# Patient Record
Sex: FEMALE | Race: WHITE | Employment: OTHER | URBAN - METROPOLITAN AREA
[De-identification: names, ages, dates, MRNs, and addresses within clinical notes are randomized per-mention and may not be internally consistent; named-entity substitution may affect disease eponyms.]

---

## 2017-01-16 PROCEDURE — 83690 ASSAY OF LIPASE: CPT | Performed by: INTERNAL MEDICINE

## 2017-01-30 PROBLEM — M11.20 CHONDROCALCINOSIS: Status: ACTIVE | Noted: 2017-01-30

## 2017-01-30 PROBLEM — M25.571 RIGHT ANKLE PAIN, UNSPECIFIED CHRONICITY: Status: ACTIVE | Noted: 2017-01-30

## 2017-01-30 PROBLEM — R29.898 RIGHT LEG WEAKNESS: Status: ACTIVE | Noted: 2017-01-30

## 2017-01-30 PROBLEM — G89.29 CHRONIC PAIN OF RIGHT KNEE: Status: ACTIVE | Noted: 2017-01-30

## 2017-01-30 PROBLEM — M25.561 CHRONIC PAIN OF RIGHT KNEE: Status: ACTIVE | Noted: 2017-01-30

## 2017-02-08 PROCEDURE — 82607 VITAMIN B-12: CPT | Performed by: INTERNAL MEDICINE

## 2017-02-08 PROCEDURE — 82746 ASSAY OF FOLIC ACID SERUM: CPT | Performed by: INTERNAL MEDICINE

## 2017-02-08 PROCEDURE — 36415 COLL VENOUS BLD VENIPUNCTURE: CPT | Performed by: INTERNAL MEDICINE

## 2017-02-08 PROCEDURE — 82306 VITAMIN D 25 HYDROXY: CPT | Performed by: INTERNAL MEDICINE

## 2017-10-25 PROBLEM — F17.209 NICOTINE DEPENDENCE WITH NICOTINE-INDUCED DISORDER: Status: ACTIVE | Noted: 2017-10-25

## 2018-03-30 PROBLEM — N32.81 OAB (OVERACTIVE BLADDER): Status: ACTIVE | Noted: 2018-03-30

## 2018-03-30 PROBLEM — N39.41 URGE INCONTINENCE OF URINE: Status: ACTIVE | Noted: 2018-03-30

## 2018-03-30 PROBLEM — R26.81 GAIT INSTABILITY: Status: ACTIVE | Noted: 2018-03-30

## 2018-03-30 PROBLEM — M15.9 PRIMARY OSTEOARTHRITIS INVOLVING MULTIPLE JOINTS: Status: ACTIVE | Noted: 2018-03-30

## 2018-08-20 PROBLEM — M79.2 NEUROPATHIC PAIN: Status: ACTIVE | Noted: 2018-08-20

## 2018-08-20 PROBLEM — I10 ESSENTIAL HYPERTENSION: Status: ACTIVE | Noted: 2018-08-20

## 2018-09-21 PROBLEM — D68.59 PRIMARY HYPERCOAGULABLE STATE (HCC): Status: ACTIVE | Noted: 2018-09-21

## 2018-09-26 ENCOUNTER — APPOINTMENT (OUTPATIENT)
Dept: GENERAL RADIOLOGY | Facility: HOSPITAL | Age: 63
End: 2018-09-26
Attending: PHYSICIAN ASSISTANT
Payer: MEDICARE

## 2018-09-26 ENCOUNTER — HOSPITAL ENCOUNTER (EMERGENCY)
Facility: HOSPITAL | Age: 63
Discharge: HOME | End: 2018-09-26
Attending: EMERGENCY MEDICINE
Payer: MEDICARE

## 2018-09-26 VITALS
HEIGHT: 70 IN | SYSTOLIC BLOOD PRESSURE: 153 MMHG | DIASTOLIC BLOOD PRESSURE: 87 MMHG | RESPIRATION RATE: 16 BRPM | WEIGHT: 250 LBS | HEART RATE: 88 BPM | OXYGEN SATURATION: 88 % | BODY MASS INDEX: 35.79 KG/M2 | TEMPERATURE: 97 F

## 2018-09-26 DIAGNOSIS — J44.1 COPD EXACERBATION (HCC): Primary | ICD-10-CM

## 2018-09-26 DIAGNOSIS — J40 BRONCHITIS: ICD-10-CM

## 2018-09-26 PROCEDURE — 80048 BASIC METABOLIC PNL TOTAL CA: CPT | Performed by: PHYSICIAN ASSISTANT

## 2018-09-26 PROCEDURE — 71046 X-RAY EXAM CHEST 2 VIEWS: CPT | Performed by: PHYSICIAN ASSISTANT

## 2018-09-26 PROCEDURE — 99285 EMERGENCY DEPT VISIT HI MDM: CPT

## 2018-09-26 PROCEDURE — 99284 EMERGENCY DEPT VISIT MOD MDM: CPT

## 2018-09-26 PROCEDURE — 94640 AIRWAY INHALATION TREATMENT: CPT

## 2018-09-26 PROCEDURE — 96374 THER/PROPH/DIAG INJ IV PUSH: CPT

## 2018-09-26 PROCEDURE — 85025 COMPLETE CBC W/AUTO DIFF WBC: CPT | Performed by: PHYSICIAN ASSISTANT

## 2018-09-26 PROCEDURE — 94644 CONT INHLJ TX 1ST HOUR: CPT

## 2018-09-26 RX ORDER — ALBUTEROL SULFATE 90 UG/1
2 AEROSOL, METERED RESPIRATORY (INHALATION) EVERY 4 HOURS PRN
Qty: 1 INHALER | Refills: 0 | Status: SHIPPED | OUTPATIENT
Start: 2018-09-26 | End: 2018-10-26

## 2018-09-26 RX ORDER — PREDNISONE 20 MG/1
40 TABLET ORAL ONCE
Status: DISCONTINUED | OUTPATIENT
Start: 2018-09-26 | End: 2018-09-26

## 2018-09-26 RX ORDER — PREDNISONE 20 MG/1
40 TABLET ORAL DAILY
Qty: 10 TABLET | Refills: 0 | Status: SHIPPED | OUTPATIENT
Start: 2018-09-26 | End: 2018-09-26

## 2018-09-26 RX ORDER — DOXYCYCLINE HYCLATE 100 MG/1
100 CAPSULE ORAL 2 TIMES DAILY
Qty: 20 CAPSULE | Refills: 0 | Status: SHIPPED | OUTPATIENT
Start: 2018-09-26 | End: 2018-10-06

## 2018-09-26 RX ORDER — DEXAMETHASONE SODIUM PHOSPHATE 4 MG/ML
10 VIAL (ML) INJECTION ONCE
Status: COMPLETED | OUTPATIENT
Start: 2018-09-26 | End: 2018-09-26

## 2018-09-26 NOTE — ED PROVIDER NOTES
Patient Seen in: [facility] Emergency Department    History   Patient presents with:  Dyspnea SEMAJ SOB (respiratory)    Stated Complaint: SEMAJ x 3 days    HPI    Patient is a 58-year-old female with a medical history of protein S deficiency, hyperlipi cyst removed  No date: CHOLECYSTECTOMY  6/12/2015: MOUTH ODONTECTOMY; N/A      Comment:  Performed by Bob Mays DDS at [address] [facility],[address]: OTHER; Bilateral      Comment:  Knee caps removed  2004: OTHER      Comment:  All upper teeth removal  No date: O deformity, NVI  Back: Full range of motion  Skin: No sign of trauma, Skin warm and dry, no induration or sign of infection. Neuro: Cranial nerves intact, Normal Gait.     ED Course     Labs Reviewed   BASIC METABOLIC PANEL (8) - Abnormal; Notable for the Normal size cardiac silhouette. MEDIASTINUM:  Normal. PLEURA:  Normal.  No pleural effusions. BONES:  Normal for age. CONCLUSION:  1. COPD/bronchitis.  2. Multiple bilateral nodular densities on the lateral projection may represent lymph nodes or mass (100 mg total) by mouth 2 (two) times daily for 10 days. Qty: 20 capsule Refills: 0    !! - Potential duplicate medications found. Please discuss with provider.

## 2018-09-27 NOTE — CM/SW NOTE
Fax from [phone] received regarding new prescription from ED, Doxycycline Hyclate increases risk for bleeding while on warfarin.  This writer talked to Dr. Melendez Manual that reports patient should take new medication, but needs repeat INR on M

## 2018-10-01 PROBLEM — R93.89 ABNORMAL CXR: Status: ACTIVE | Noted: 2018-10-01

## 2018-11-12 PROCEDURE — 80178 ASSAY OF LITHIUM: CPT | Performed by: INTERNAL MEDICINE

## 2018-11-12 PROCEDURE — 36415 COLL VENOUS BLD VENIPUNCTURE: CPT | Performed by: INTERNAL MEDICINE

## 2019-01-01 ENCOUNTER — APPOINTMENT (OUTPATIENT)
Dept: GENERAL RADIOLOGY | Facility: HOSPITAL | Age: 64
DRG: 907 | End: 2019-01-01
Attending: INTERNAL MEDICINE
Payer: MEDICARE

## 2019-01-01 ENCOUNTER — HOSPITAL ENCOUNTER (EMERGENCY)
Facility: HOSPITAL | Age: 64
Discharge: LEFT BEFORE DISCHARGE | End: 2019-01-01
Attending: EMERGENCY MEDICINE
Payer: MEDICARE

## 2019-01-01 ENCOUNTER — HOSPITAL ENCOUNTER (INPATIENT)
Facility: HOSPITAL | Age: 64
LOS: 24 days | Discharge: NURSING, REHAB OR LONG-TERM CARE | DRG: 907 | End: 2020-01-01
Attending: EMERGENCY MEDICINE | Admitting: INTERNAL MEDICINE
Payer: MEDICARE

## 2019-01-01 ENCOUNTER — HOSPITAL ENCOUNTER (EMERGENCY)
Facility: HOSPITAL | Age: 64
Discharge: HOME | End: 2019-01-01
Attending: EMERGENCY MEDICINE
Payer: MEDICARE

## 2019-01-01 ENCOUNTER — APPOINTMENT (OUTPATIENT)
Dept: GENERAL RADIOLOGY | Facility: HOSPITAL | Age: 64
End: 2019-01-01
Attending: EMERGENCY MEDICINE
Payer: MEDICARE

## 2019-01-01 ENCOUNTER — APPOINTMENT (OUTPATIENT)
Dept: ULTRASOUND IMAGING | Facility: HOSPITAL | Age: 64
DRG: 907 | End: 2019-01-01
Attending: INTERNAL MEDICINE
Payer: MEDICARE

## 2019-01-01 ENCOUNTER — APPOINTMENT (OUTPATIENT)
Dept: GENERAL RADIOLOGY | Facility: HOSPITAL | Age: 64
End: 2019-01-01
Attending: PHYSICIAN ASSISTANT
Payer: MEDICARE

## 2019-01-01 ENCOUNTER — APPOINTMENT (OUTPATIENT)
Dept: MRI IMAGING | Facility: HOSPITAL | Age: 64
DRG: 907 | End: 2019-01-01
Attending: Other
Payer: MEDICARE

## 2019-01-01 ENCOUNTER — APPOINTMENT (OUTPATIENT)
Dept: CT IMAGING | Facility: HOSPITAL | Age: 64
End: 2019-01-01
Attending: EMERGENCY MEDICINE
Payer: MEDICARE

## 2019-01-01 ENCOUNTER — APPOINTMENT (OUTPATIENT)
Dept: CT IMAGING | Facility: HOSPITAL | Age: 64
DRG: 907 | End: 2019-01-01
Attending: INTERNAL MEDICINE
Payer: MEDICARE

## 2019-01-01 ENCOUNTER — ANESTHESIA EVENT (OUTPATIENT)
Dept: SURGERY | Facility: HOSPITAL | Age: 64
DRG: 907 | End: 2019-01-01
Payer: MEDICARE

## 2019-01-01 ENCOUNTER — APPOINTMENT (OUTPATIENT)
Dept: GENERAL RADIOLOGY | Facility: HOSPITAL | Age: 64
DRG: 907 | End: 2019-01-01
Attending: NURSE PRACTITIONER
Payer: MEDICARE

## 2019-01-01 ENCOUNTER — APPOINTMENT (OUTPATIENT)
Dept: CT IMAGING | Facility: HOSPITAL | Age: 64
DRG: 907 | End: 2019-01-01
Attending: EMERGENCY MEDICINE
Payer: MEDICARE

## 2019-01-01 ENCOUNTER — APPOINTMENT (OUTPATIENT)
Dept: GENERAL RADIOLOGY | Facility: HOSPITAL | Age: 64
DRG: 907 | End: 2019-01-01
Attending: EMERGENCY MEDICINE
Payer: MEDICARE

## 2019-01-01 ENCOUNTER — APPOINTMENT (OUTPATIENT)
Dept: CT IMAGING | Facility: HOSPITAL | Age: 64
End: 2019-01-01
Attending: PHYSICIAN ASSISTANT
Payer: MEDICARE

## 2019-01-01 ENCOUNTER — ANESTHESIA (OUTPATIENT)
Dept: SURGERY | Facility: HOSPITAL | Age: 64
DRG: 907 | End: 2019-01-01
Payer: MEDICARE

## 2019-01-01 VITALS
HEIGHT: 70 IN | BODY MASS INDEX: 35.79 KG/M2 | HEART RATE: 73 BPM | SYSTOLIC BLOOD PRESSURE: 155 MMHG | RESPIRATION RATE: 22 BRPM | OXYGEN SATURATION: 96 % | WEIGHT: 250 LBS | DIASTOLIC BLOOD PRESSURE: 95 MMHG | TEMPERATURE: 98 F

## 2019-01-01 VITALS
DIASTOLIC BLOOD PRESSURE: 93 MMHG | HEART RATE: 74 BPM | TEMPERATURE: 98 F | SYSTOLIC BLOOD PRESSURE: 174 MMHG | OXYGEN SATURATION: 100 % | RESPIRATION RATE: 16 BRPM

## 2019-01-01 DIAGNOSIS — R41.0 CONFUSION: Primary | ICD-10-CM

## 2019-01-01 DIAGNOSIS — R41.82 ALTERED MENTAL STATUS, UNSPECIFIED ALTERED MENTAL STATUS TYPE: Primary | ICD-10-CM

## 2019-01-01 DIAGNOSIS — N25.1 NEPHROGENIC DIABETES INSIPIDUS (HCC): ICD-10-CM

## 2019-01-01 DIAGNOSIS — R73.9 HYPERGLYCEMIA: ICD-10-CM

## 2019-01-01 DIAGNOSIS — K66.8 PERITONEAL CAVITY FREE AIR: ICD-10-CM

## 2019-01-01 DIAGNOSIS — J44.1 COPD EXACERBATION (HCC): ICD-10-CM

## 2019-01-01 DIAGNOSIS — S93.401A SPRAIN OF RIGHT ANKLE, UNSPECIFIED LIGAMENT, INITIAL ENCOUNTER: Primary | ICD-10-CM

## 2019-01-01 DIAGNOSIS — K66.8 PERITONEAL CAVITY FREE AIR: Primary | ICD-10-CM

## 2019-01-01 LAB
ALBUMIN SERPL ELPH-MCNC: 3.78 G/DL (ref 3.75–5.21)
ALBUMIN SERPL-MCNC: 2.3 G/DL (ref 3.4–5)
ALBUMIN SERPL-MCNC: 2.3 G/DL (ref 3.4–5)
ALBUMIN SERPL-MCNC: 2.5 G/DL (ref 3.4–5)
ALBUMIN SERPL-MCNC: 2.5 G/DL (ref 3.4–5)
ALBUMIN SERPL-MCNC: 2.6 G/DL (ref 3.4–5)
ALBUMIN SERPL-MCNC: 2.7 G/DL (ref 3.4–5)
ALBUMIN SERPL-MCNC: 2.7 G/DL (ref 3.4–5)
ALBUMIN SERPL-MCNC: 2.8 G/DL (ref 3.4–5)
ALBUMIN SERPL-MCNC: 2.8 G/DL (ref 3.4–5)
ALBUMIN SERPL-MCNC: 3.1 G/DL (ref 3.4–5)
ALBUMIN SERPL-MCNC: 3.2 G/DL (ref 3.4–5)
ALBUMIN SERPL-MCNC: 3.3 G/DL (ref 3.4–5)
ALBUMIN SERPL-MCNC: 4 G/DL (ref 3.4–5)
ALBUMIN/GLOB SERPL: 0.8 {RATIO} (ref 1–2)
ALBUMIN/GLOB SERPL: 0.9 {RATIO} (ref 1–2)
ALBUMIN/GLOB SERPL: 0.9 {RATIO} (ref 1–2)
ALBUMIN/GLOB SERPL: 1.2 {RATIO} (ref 1–2)
ALBUMIN/GLOB SERPL: 1.44 {RATIO} (ref 1–2)
ALDOLASE, SERUM: 3.9 U/L
ALLENS TEST: POSITIVE
ALLENS TEST: POSITIVE
ALP LIVER SERPL-CCNC: 110 U/L (ref 55–142)
ALP LIVER SERPL-CCNC: 156 U/L (ref 55–142)
ALP LIVER SERPL-CCNC: 179 U/L (ref 55–142)
ALP LIVER SERPL-CCNC: 91 U/L (ref 55–142)
ALPHA1 GLOB SERPL ELPH-MCNC: 0.36 G/DL (ref 0.19–0.46)
ALPHA2 GLOB SERPL ELPH-MCNC: 0.86 G/DL (ref 0.48–1.05)
ALT SERPL-CCNC: 23 U/L (ref 13–56)
ALT SERPL-CCNC: 23 U/L (ref 13–56)
ALT SERPL-CCNC: 31 U/L (ref 13–56)
ALT SERPL-CCNC: 34 U/L (ref 13–56)
AMMONIA PLAS-MCNC: 11 UMOL/L (ref 11–32)
AMMONIA PLAS-MCNC: 13 UMOL/L (ref 11–32)
AMMONIA PLAS-MCNC: 36 UMOL/L (ref 11–32)
AMPHET UR QL SCN: NEGATIVE
ANA SCREEN: NEGATIVE
ANION GAP SERPL CALC-SCNC: 0 MMOL/L (ref 0–18)
ANION GAP SERPL CALC-SCNC: 1 MMOL/L (ref 0–18)
ANION GAP SERPL CALC-SCNC: 10 MMOL/L (ref 0–18)
ANION GAP SERPL CALC-SCNC: 3 MMOL/L (ref 0–18)
ANION GAP SERPL CALC-SCNC: 4 MMOL/L (ref 0–18)
ANION GAP SERPL CALC-SCNC: 5 MMOL/L (ref 0–18)
ANION GAP SERPL CALC-SCNC: 6 MMOL/L (ref 0–18)
ANION GAP SERPL CALC-SCNC: 7 MMOL/L (ref 0–18)
ANION GAP SERPL CALC-SCNC: 8 MMOL/L (ref 0–18)
ANION GAP SERPL CALC-SCNC: 9 MMOL/L (ref 0–18)
ANTIBODY SCREEN: NEGATIVE
ARTERIAL BLD GAS O2 SATURATION: 90 % (ref 92–100)
ARTERIAL BLD GAS O2 SATURATION: 93 % (ref 92–100)
ARTERIAL BLD GAS O2 SATURATION: 95 % (ref 92–100)
ARTERIAL BLOOD GAS BASE EXCESS: -1.3 MMOL/L (ref ?–2)
ARTERIAL BLOOD GAS BASE EXCESS: -2.8 MMOL/L (ref ?–2)
ARTERIAL BLOOD GAS BASE EXCESS: -3.7 MMOL/L (ref ?–2)
ARTERIAL BLOOD GAS HCO3: 21.6 MEQ/L (ref 22–26)
ARTERIAL BLOOD GAS HCO3: 22.1 MEQ/L (ref 22–26)
ARTERIAL BLOOD GAS HCO3: 23.3 MEQ/L (ref 22–26)
ARTERIAL BLOOD GAS PCO2: 33 MM HG (ref 35–45)
ARTERIAL BLOOD GAS PCO2: 40 MM HG (ref 35–45)
ARTERIAL BLOOD GAS PCO2: 45 MM HG (ref 35–45)
ARTERIAL BLOOD GAS PH: 7.33 (ref 7.35–7.45)
ARTERIAL BLOOD GAS PH: 7.35 (ref 7.35–7.45)
ARTERIAL BLOOD GAS PH: 7.44 (ref 7.35–7.45)
ARTERIAL BLOOD GAS PO2: 62 MM HG (ref 80–105)
ARTERIAL BLOOD GAS PO2: 81 MM HG (ref 80–105)
ARTERIAL BLOOD GAS PO2: 89 MM HG (ref 80–105)
AST SERPL-CCNC: 15 U/L (ref 15–37)
AST SERPL-CCNC: 18 U/L (ref 15–37)
AST SERPL-CCNC: 24 U/L (ref 15–37)
AST SERPL-CCNC: 27 U/L (ref 15–37)
ATRIAL RATE: 67 BPM
ATRIAL RATE: 80 BPM
ATRIAL RATE: 83 BPM
B BURGDOR IGG+IGM SER QL: NEGATIVE
B-GLOBULIN SERPL ELPH-MCNC: 0.82 G/DL (ref 0.68–1.23)
BARBITURATES UR QL SCN: NEGATIVE
BASOPHILS # BLD AUTO: 0.01 X10(3) UL (ref 0–0.2)
BASOPHILS # BLD AUTO: 0.02 X10(3) UL (ref 0–0.2)
BASOPHILS # BLD AUTO: 0.03 X10(3) UL (ref 0–0.2)
BASOPHILS # BLD AUTO: 0.03 X10(3) UL (ref 0–0.2)
BASOPHILS NFR BLD AUTO: 0.1 %
BASOPHILS NFR BLD AUTO: 0.2 %
BILIRUB SERPL-MCNC: 0.4 MG/DL (ref 0.1–2)
BILIRUB SERPL-MCNC: 0.5 MG/DL (ref 0.1–2)
BILIRUB SERPL-MCNC: 0.6 MG/DL (ref 0.1–2)
BILIRUB SERPL-MCNC: 0.7 MG/DL (ref 0.1–2)
BILIRUB UR QL STRIP.AUTO: NEGATIVE
BILIRUB UR QL STRIP.AUTO: NEGATIVE
BLOOD TYPE BARCODE: 7300
BUN BLD-MCNC: 10 MG/DL (ref 7–18)
BUN BLD-MCNC: 108 MG/DL (ref 7–18)
BUN BLD-MCNC: 109 MG/DL (ref 7–18)
BUN BLD-MCNC: 109 MG/DL (ref 7–18)
BUN BLD-MCNC: 11 MG/DL (ref 7–18)
BUN BLD-MCNC: 14 MG/DL (ref 7–18)
BUN BLD-MCNC: 15 MG/DL (ref 7–18)
BUN BLD-MCNC: 18 MG/DL (ref 7–18)
BUN BLD-MCNC: 19 MG/DL (ref 7–18)
BUN BLD-MCNC: 20 MG/DL (ref 7–18)
BUN BLD-MCNC: 21 MG/DL (ref 7–18)
BUN BLD-MCNC: 26 MG/DL (ref 7–18)
BUN BLD-MCNC: 28 MG/DL (ref 7–18)
BUN BLD-MCNC: 34 MG/DL (ref 7–18)
BUN BLD-MCNC: 35 MG/DL (ref 7–18)
BUN BLD-MCNC: 39 MG/DL (ref 7–18)
BUN BLD-MCNC: 50 MG/DL (ref 7–18)
BUN BLD-MCNC: 50 MG/DL (ref 7–18)
BUN BLD-MCNC: 67 MG/DL (ref 7–18)
BUN BLD-MCNC: 68 MG/DL (ref 7–18)
BUN BLD-MCNC: 79 MG/DL (ref 7–18)
BUN BLD-MCNC: 85 MG/DL (ref 7–18)
BUN BLD-MCNC: 88 MG/DL (ref 7–18)
BUN BLD-MCNC: 89 MG/DL (ref 7–18)
BUN BLD-MCNC: 99 MG/DL (ref 7–18)
BUN/CREAT SERPL: 10.5 (ref 10–20)
BUN/CREAT SERPL: 11 (ref 10–20)
BUN/CREAT SERPL: 11.9 (ref 10–20)
BUN/CREAT SERPL: 14.9 (ref 10–20)
BUN/CREAT SERPL: 15.4 (ref 10–20)
BUN/CREAT SERPL: 15.7 (ref 10–20)
BUN/CREAT SERPL: 15.9 (ref 10–20)
BUN/CREAT SERPL: 18.1 (ref 10–20)
BUN/CREAT SERPL: 18.7 (ref 10–20)
BUN/CREAT SERPL: 19.2 (ref 10–20)
BUN/CREAT SERPL: 21.1 (ref 10–20)
BUN/CREAT SERPL: 22.5 (ref 10–20)
BUN/CREAT SERPL: 22.5 (ref 10–20)
BUN/CREAT SERPL: 23.6 (ref 10–20)
BUN/CREAT SERPL: 24.2 (ref 10–20)
BUN/CREAT SERPL: 25.2 (ref 10–20)
BUN/CREAT SERPL: 27.9 (ref 10–20)
BUN/CREAT SERPL: 27.9 (ref 10–20)
BUN/CREAT SERPL: 31.8 (ref 10–20)
BUN/CREAT SERPL: 32.5 (ref 10–20)
BUN/CREAT SERPL: 35.1 (ref 10–20)
BUN/CREAT SERPL: 37 (ref 10–20)
BUN/CREAT SERPL: 40.3 (ref 10–20)
BUN/CREAT SERPL: 41.4 (ref 10–20)
BUN/CREAT SERPL: 42.1 (ref 10–20)
BUN/CREAT SERPL: 42.3 (ref 10–20)
BUN/CREAT SERPL: 44 (ref 10–20)
BUN/CREAT SERPL: 45.2 (ref 10–20)
BUN/CREAT SERPL: 45.2 (ref 10–20)
BUN/CREAT SERPL: 46.8 (ref 10–20)
BUN/CREAT SERPL: 7.8 (ref 10–20)
CALCIUM BLD-MCNC: 10 MG/DL (ref 8.5–10.1)
CALCIUM BLD-MCNC: 10.1 MG/DL (ref 8.5–10.1)
CALCIUM BLD-MCNC: 10.1 MG/DL (ref 8.5–10.1)
CALCIUM BLD-MCNC: 10.3 MG/DL (ref 8.5–10.1)
CALCIUM BLD-MCNC: 10.3 MG/DL (ref 8.5–10.1)
CALCIUM BLD-MCNC: 10.4 MG/DL (ref 8.5–10.1)
CALCIUM BLD-MCNC: 10.6 MG/DL (ref 8.5–10.1)
CALCIUM BLD-MCNC: 10.8 MG/DL (ref 8.5–10.1)
CALCIUM BLD-MCNC: 9 MG/DL (ref 8.5–10.1)
CALCIUM BLD-MCNC: 9 MG/DL (ref 8.5–10.1)
CALCIUM BLD-MCNC: 9.1 MG/DL (ref 8.5–10.1)
CALCIUM BLD-MCNC: 9.2 MG/DL (ref 8.5–10.1)
CALCIUM BLD-MCNC: 9.2 MG/DL (ref 8.5–10.1)
CALCIUM BLD-MCNC: 9.4 MG/DL (ref 8.5–10.1)
CALCIUM BLD-MCNC: 9.5 MG/DL (ref 8.5–10.1)
CALCIUM BLD-MCNC: 9.5 MG/DL (ref 8.5–10.1)
CALCIUM BLD-MCNC: 9.6 MG/DL (ref 8.5–10.1)
CALCIUM BLD-MCNC: 9.6 MG/DL (ref 8.5–10.1)
CALCIUM BLD-MCNC: 9.7 MG/DL (ref 8.5–10.1)
CALCIUM BLD-MCNC: 9.8 MG/DL (ref 8.5–10.1)
CALCIUM BLD-MCNC: 9.8 MG/DL (ref 8.5–10.1)
CALCIUM BLD-MCNC: 9.9 MG/DL (ref 8.5–10.1)
CALCIUM BLD-MCNC: 9.9 MG/DL (ref 8.5–10.1)
CALCULATED O2 SATURATION: 90 % (ref 92–100)
CALCULATED O2 SATURATION: 95 % (ref 92–100)
CALCULATED O2 SATURATION: 97 % (ref 92–100)
CANNABINOIDS UR QL SCN: NEGATIVE
CARBOXYHEMOGLOBIN: 1.6 % SAT (ref 0–3)
CARBOXYHEMOGLOBIN: 1.7 % SAT (ref 0–3)
CARBOXYHEMOGLOBIN: 1.9 % SAT (ref 0–3)
CHLORIDE SERPL-SCNC: 113 MMOL/L (ref 98–112)
CHLORIDE SERPL-SCNC: 113 MMOL/L (ref 98–112)
CHLORIDE SERPL-SCNC: 115 MMOL/L (ref 98–112)
CHLORIDE SERPL-SCNC: 116 MMOL/L (ref 98–112)
CHLORIDE SERPL-SCNC: 116 MMOL/L (ref 98–112)
CHLORIDE SERPL-SCNC: 117 MMOL/L (ref 98–112)
CHLORIDE SERPL-SCNC: 118 MMOL/L (ref 98–112)
CHLORIDE SERPL-SCNC: 119 MMOL/L (ref 98–112)
CHLORIDE SERPL-SCNC: 119 MMOL/L (ref 98–112)
CHLORIDE SERPL-SCNC: 120 MMOL/L (ref 98–112)
CHLORIDE SERPL-SCNC: 121 MMOL/L (ref 98–112)
CHLORIDE SERPL-SCNC: 121 MMOL/L (ref 98–112)
CHLORIDE SERPL-SCNC: 122 MMOL/L (ref 98–112)
CHLORIDE SERPL-SCNC: 122 MMOL/L (ref 98–112)
CHLORIDE SERPL-SCNC: 123 MMOL/L (ref 98–112)
CHLORIDE SERPL-SCNC: 123 MMOL/L (ref 98–112)
CHLORIDE SERPL-SCNC: 124 MMOL/L (ref 98–112)
CHLORIDE SERPL-SCNC: 124 MMOL/L (ref 98–112)
CHLORIDE SERPL-SCNC: 127 MMOL/L (ref 98–112)
CHLORIDE SERPL-SCNC: 128 MMOL/L (ref 98–112)
CHLORIDE SERPL-SCNC: 129 MMOL/L (ref 98–112)
CHLORIDE SERPL-SCNC: 132 MMOL/L (ref 98–112)
CHLORIDE SERPL-SCNC: 132 MMOL/L (ref 98–112)
CK SERPL-CCNC: 65 U/L (ref 26–192)
CLARITY UR REFRACT.AUTO: CLEAR
CLARITY UR REFRACT.AUTO: CLEAR
CO2 SERPL-SCNC: 14 MMOL/L (ref 21–32)
CO2 SERPL-SCNC: 15 MMOL/L (ref 21–32)
CO2 SERPL-SCNC: 17 MMOL/L (ref 21–32)
CO2 SERPL-SCNC: 17 MMOL/L (ref 21–32)
CO2 SERPL-SCNC: 18 MMOL/L (ref 21–32)
CO2 SERPL-SCNC: 18 MMOL/L (ref 21–32)
CO2 SERPL-SCNC: 19 MMOL/L (ref 21–32)
CO2 SERPL-SCNC: 20 MMOL/L (ref 21–32)
CO2 SERPL-SCNC: 21 MMOL/L (ref 21–32)
CO2 SERPL-SCNC: 21 MMOL/L (ref 21–32)
CO2 SERPL-SCNC: 22 MMOL/L (ref 21–32)
CO2 SERPL-SCNC: 22 MMOL/L (ref 21–32)
CO2 SERPL-SCNC: 23 MMOL/L (ref 21–32)
CO2 SERPL-SCNC: 24 MMOL/L (ref 21–32)
CO2 SERPL-SCNC: 25 MMOL/L (ref 21–32)
CO2 SERPL-SCNC: 26 MMOL/L (ref 21–32)
CO2 SERPL-SCNC: 27 MMOL/L (ref 21–32)
CO2 SERPL-SCNC: 28 MMOL/L (ref 21–32)
CO2 SERPL-SCNC: 28 MMOL/L (ref 21–32)
COCAINE UR QL: NEGATIVE
CREAT BLD-MCNC: 1.05 MG/DL (ref 0.55–1.02)
CREAT BLD-MCNC: 1.21 MG/DL (ref 0.55–1.02)
CREAT BLD-MCNC: 1.23 MG/DL (ref 0.55–1.02)
CREAT BLD-MCNC: 1.26 MG/DL (ref 0.55–1.02)
CREAT BLD-MCNC: 1.26 MG/DL (ref 0.55–1.02)
CREAT BLD-MCNC: 1.27 MG/DL (ref 0.55–1.02)
CREAT BLD-MCNC: 1.29 MG/DL (ref 0.55–1.02)
CREAT BLD-MCNC: 1.33 MG/DL (ref 0.55–1.02)
CREAT BLD-MCNC: 1.34 MG/DL (ref 0.55–1.02)
CREAT BLD-MCNC: 1.35 MG/DL (ref 0.55–1.02)
CREAT BLD-MCNC: 1.39 MG/DL (ref 0.55–1.02)
CREAT BLD-MCNC: 1.4 MG/DL (ref 0.55–1.02)
CREAT BLD-MCNC: 1.4 MG/DL (ref 0.55–1.02)
CREAT BLD-MCNC: 1.46 MG/DL (ref 0.55–1.02)
CREAT BLD-MCNC: 1.48 MG/DL (ref 0.55–1.02)
CREAT BLD-MCNC: 1.51 MG/DL (ref 0.55–1.02)
CREAT BLD-MCNC: 1.51 MG/DL (ref 0.55–1.02)
CREAT BLD-MCNC: 1.54 MG/DL (ref 0.55–1.02)
CREAT BLD-MCNC: 1.55 MG/DL (ref 0.55–1.02)
CREAT BLD-MCNC: 1.57 MG/DL (ref 0.55–1.02)
CREAT BLD-MCNC: 1.61 MG/DL (ref 0.55–1.02)
CREAT BLD-MCNC: 1.81 MG/DL (ref 0.55–1.02)
CREAT BLD-MCNC: 1.91 MG/DL (ref 0.55–1.02)
CREAT BLD-MCNC: 1.94 MG/DL (ref 0.55–1.02)
CREAT BLD-MCNC: 2.01 MG/DL (ref 0.55–1.02)
CREAT BLD-MCNC: 2.09 MG/DL (ref 0.55–1.02)
CREAT BLD-MCNC: 2.21 MG/DL (ref 0.55–1.02)
CREAT BLD-MCNC: 2.25 MG/DL (ref 0.55–1.02)
CREAT BLD-MCNC: 2.31 MG/DL (ref 0.55–1.02)
CREAT BLD-MCNC: 2.41 MG/DL (ref 0.55–1.02)
CREAT BLD-MCNC: 2.41 MG/DL (ref 0.55–1.02)
CREAT UR-SCNC: 16 MG/DL
CREAT UR-SCNC: 17.2 MG/DL
CREAT UR-SCNC: 28.7 MG/DL
CREAT UR-SCNC: 56.8 MG/DL
DEPRECATED RDW RBC AUTO: 44.3 FL (ref 35.1–46.3)
DEPRECATED RDW RBC AUTO: 45.4 FL (ref 35.1–46.3)
DEPRECATED RDW RBC AUTO: 46.1 FL (ref 35.1–46.3)
DEPRECATED RDW RBC AUTO: 46.3 FL (ref 35.1–46.3)
DEPRECATED RDW RBC AUTO: 46.5 FL (ref 35.1–46.3)
DEPRECATED RDW RBC AUTO: 47 FL (ref 35.1–46.3)
DEPRECATED RDW RBC AUTO: 47.4 FL (ref 35.1–46.3)
DEPRECATED RDW RBC AUTO: 47.9 FL (ref 35.1–46.3)
DEPRECATED RDW RBC AUTO: 48.3 FL (ref 35.1–46.3)
DEPRECATED RDW RBC AUTO: 48.4 FL (ref 35.1–46.3)
DEPRECATED RDW RBC AUTO: 49.1 FL (ref 35.1–46.3)
DEPRECATED RDW RBC AUTO: 49.4 FL (ref 35.1–46.3)
DEPRECATED RDW RBC AUTO: 49.9 FL (ref 35.1–46.3)
DEPRECATED RDW RBC AUTO: 50.3 FL (ref 35.1–46.3)
DEPRECATED RDW RBC AUTO: 50.4 FL (ref 35.1–46.3)
DEPRECATED RDW RBC AUTO: 50.4 FL (ref 35.1–46.3)
EOSINOPHIL # BLD AUTO: 0 X10(3) UL (ref 0–0.7)
EOSINOPHIL # BLD AUTO: 0.02 X10(3) UL (ref 0–0.7)
EOSINOPHIL # BLD AUTO: 0.02 X10(3) UL (ref 0–0.7)
EOSINOPHIL # BLD AUTO: 0.03 X10(3) UL (ref 0–0.7)
EOSINOPHIL # BLD AUTO: 0.16 X10(3) UL (ref 0–0.7)
EOSINOPHIL # BLD AUTO: 0.28 X10(3) UL (ref 0–0.7)
EOSINOPHIL NFR BLD AUTO: 0 %
EOSINOPHIL NFR BLD AUTO: 0.1 %
EOSINOPHIL NFR BLD AUTO: 1.7 %
EOSINOPHIL NFR BLD AUTO: 2.1 %
ERYTHROCYTE [DISTWIDTH] IN BLOOD BY AUTOMATED COUNT: 13.5 % (ref 11–15)
ERYTHROCYTE [DISTWIDTH] IN BLOOD BY AUTOMATED COUNT: 13.6 % (ref 11–15)
ERYTHROCYTE [DISTWIDTH] IN BLOOD BY AUTOMATED COUNT: 13.7 % (ref 11–15)
ERYTHROCYTE [DISTWIDTH] IN BLOOD BY AUTOMATED COUNT: 13.7 % (ref 11–15)
ERYTHROCYTE [DISTWIDTH] IN BLOOD BY AUTOMATED COUNT: 14 % (ref 11–15)
ERYTHROCYTE [DISTWIDTH] IN BLOOD BY AUTOMATED COUNT: 14.1 % (ref 11–15)
ERYTHROCYTE [DISTWIDTH] IN BLOOD BY AUTOMATED COUNT: 14.2 % (ref 11–15)
ERYTHROCYTE [DISTWIDTH] IN BLOOD BY AUTOMATED COUNT: 14.2 % (ref 11–15)
ERYTHROCYTE [DISTWIDTH] IN BLOOD BY AUTOMATED COUNT: 14.3 % (ref 11–15)
ERYTHROCYTE [DISTWIDTH] IN BLOOD BY AUTOMATED COUNT: 14.6 % (ref 11–15)
ERYTHROCYTE [DISTWIDTH] IN BLOOD BY AUTOMATED COUNT: 14.7 % (ref 11–15)
ETHANOL SERPL-MCNC: <3 MG/DL (ref ?–3)
ETHANOL UR-MCNC: NEGATIVE MG/DL
FIO2: 40 %
FIO2: 50 %
GAMMA GLOB SERPL ELPH-MCNC: 0.59 G/DL (ref 0.62–1.7)
GLOBULIN PLAS-MCNC: 3.1 G/DL (ref 2.8–4.4)
GLOBULIN PLAS-MCNC: 3.3 G/DL (ref 2.8–4.4)
GLOBULIN PLAS-MCNC: 3.5 G/DL (ref 2.8–4.4)
GLOBULIN PLAS-MCNC: 3.8 G/DL (ref 2.8–4.4)
GLUCOSE BLD-MCNC: 105 MG/DL (ref 70–99)
GLUCOSE BLD-MCNC: 112 MG/DL (ref 70–99)
GLUCOSE BLD-MCNC: 119 MG/DL (ref 70–99)
GLUCOSE BLD-MCNC: 122 MG/DL (ref 70–99)
GLUCOSE BLD-MCNC: 122 MG/DL (ref 70–99)
GLUCOSE BLD-MCNC: 126 MG/DL (ref 70–99)
GLUCOSE BLD-MCNC: 127 MG/DL (ref 70–99)
GLUCOSE BLD-MCNC: 127 MG/DL (ref 70–99)
GLUCOSE BLD-MCNC: 129 MG/DL (ref 70–99)
GLUCOSE BLD-MCNC: 130 MG/DL (ref 70–99)
GLUCOSE BLD-MCNC: 131 MG/DL (ref 70–99)
GLUCOSE BLD-MCNC: 132 MG/DL (ref 70–99)
GLUCOSE BLD-MCNC: 134 MG/DL (ref 70–99)
GLUCOSE BLD-MCNC: 137 MG/DL (ref 70–99)
GLUCOSE BLD-MCNC: 138 MG/DL (ref 70–99)
GLUCOSE BLD-MCNC: 139 MG/DL (ref 70–99)
GLUCOSE BLD-MCNC: 142 MG/DL (ref 70–99)
GLUCOSE BLD-MCNC: 142 MG/DL (ref 70–99)
GLUCOSE BLD-MCNC: 143 MG/DL (ref 70–99)
GLUCOSE BLD-MCNC: 144 MG/DL (ref 70–99)
GLUCOSE BLD-MCNC: 145 MG/DL (ref 70–99)
GLUCOSE BLD-MCNC: 145 MG/DL (ref 70–99)
GLUCOSE BLD-MCNC: 146 MG/DL (ref 70–99)
GLUCOSE BLD-MCNC: 147 MG/DL (ref 70–99)
GLUCOSE BLD-MCNC: 148 MG/DL (ref 70–99)
GLUCOSE BLD-MCNC: 149 MG/DL (ref 70–99)
GLUCOSE BLD-MCNC: 150 MG/DL (ref 70–99)
GLUCOSE BLD-MCNC: 150 MG/DL (ref 70–99)
GLUCOSE BLD-MCNC: 153 MG/DL (ref 70–99)
GLUCOSE BLD-MCNC: 154 MG/DL (ref 70–99)
GLUCOSE BLD-MCNC: 155 MG/DL (ref 70–99)
GLUCOSE BLD-MCNC: 156 MG/DL (ref 70–99)
GLUCOSE BLD-MCNC: 156 MG/DL (ref 70–99)
GLUCOSE BLD-MCNC: 158 MG/DL (ref 70–99)
GLUCOSE BLD-MCNC: 160 MG/DL (ref 70–99)
GLUCOSE BLD-MCNC: 160 MG/DL (ref 70–99)
GLUCOSE BLD-MCNC: 167 MG/DL (ref 70–99)
GLUCOSE BLD-MCNC: 168 MG/DL (ref 70–99)
GLUCOSE BLD-MCNC: 169 MG/DL (ref 70–99)
GLUCOSE BLD-MCNC: 172 MG/DL (ref 70–99)
GLUCOSE BLD-MCNC: 307 MG/DL (ref 70–99)
GLUCOSE UR STRIP.AUTO-MCNC: NEGATIVE MG/DL
GLUCOSE UR STRIP.AUTO-MCNC: NEGATIVE MG/DL
HAV IGM SER QL: 2.4 MG/DL (ref 1.6–2.6)
HAV IGM SER QL: 2.4 MG/DL (ref 1.6–2.6)
HAV IGM SER QL: 2.5 MG/DL (ref 1.6–2.6)
HAV IGM SER QL: 2.7 MG/DL (ref 1.6–2.6)
HAV IGM SER QL: 2.8 MG/DL (ref 1.6–2.6)
HAV IGM SER QL: 2.9 MG/DL (ref 1.6–2.6)
HCT VFR BLD AUTO: 38.9 % (ref 35–48)
HCT VFR BLD AUTO: 43.7 % (ref 35–48)
HCT VFR BLD AUTO: 44.9 % (ref 35–48)
HCT VFR BLD AUTO: 46.8 % (ref 35–48)
HCT VFR BLD AUTO: 46.8 % (ref 35–48)
HCT VFR BLD AUTO: 49.3 % (ref 35–48)
HCT VFR BLD AUTO: 50.7 % (ref 35–48)
HCT VFR BLD AUTO: 51.9 % (ref 35–48)
HCT VFR BLD AUTO: 52.2 % (ref 35–48)
HCT VFR BLD AUTO: 52.2 % (ref 35–48)
HCT VFR BLD AUTO: 52.3 % (ref 35–48)
HCT VFR BLD AUTO: 52.6 % (ref 35–48)
HCT VFR BLD AUTO: 52.9 % (ref 35–48)
HCT VFR BLD AUTO: 53 % (ref 35–48)
HCT VFR BLD AUTO: 54.9 % (ref 35–48)
HCT VFR BLD AUTO: 55.1 % (ref 35–48)
HCT VFR BLD AUTO: 55.9 % (ref 35–48)
HEMATOCRIT (CLIENT SUPPLIED): 35.2 %
HGB BLD-MCNC: 12.2 G/DL (ref 12–16)
HGB BLD-MCNC: 13.2 G/DL (ref 12–16)
HGB BLD-MCNC: 13.5 G/DL (ref 12–16)
HGB BLD-MCNC: 13.8 G/DL (ref 12–16)
HGB BLD-MCNC: 13.8 G/DL (ref 12–16)
HGB BLD-MCNC: 14 G/DL (ref 12–16)
HGB BLD-MCNC: 15.6 G/DL (ref 12–16)
HGB BLD-MCNC: 16 G/DL (ref 12–16)
HGB BLD-MCNC: 16.1 G/DL (ref 12–16)
HGB BLD-MCNC: 16.2 G/DL (ref 12–16)
HGB BLD-MCNC: 16.3 G/DL (ref 12–16)
HGB BLD-MCNC: 16.5 G/DL (ref 12–16)
HGB BLD-MCNC: 16.7 G/DL (ref 12–16)
HGB BLD-MCNC: 16.9 G/DL (ref 12–16)
HGB BLD-MCNC: 17.4 G/DL (ref 12–16)
HGB BLD-MCNC: 17.6 G/DL (ref 12–16)
HGB BLD-MCNC: 18 G/DL (ref 12–16)
IGA SERPL-MCNC: 136 MG/DL (ref 70–312)
IGM SERPL-MCNC: 74.1 MG/DL (ref 43–279)
IMM GRANULOCYTES # BLD AUTO: 0.03 X10(3) UL (ref 0–1)
IMM GRANULOCYTES # BLD AUTO: 0.08 X10(3) UL (ref 0–1)
IMM GRANULOCYTES # BLD AUTO: 0.08 X10(3) UL (ref 0–1)
IMM GRANULOCYTES # BLD AUTO: 0.11 X10(3) UL (ref 0–1)
IMM GRANULOCYTES # BLD AUTO: 0.11 X10(3) UL (ref 0–1)
IMM GRANULOCYTES # BLD AUTO: 0.13 X10(3) UL (ref 0–1)
IMM GRANULOCYTES NFR BLD: 0.3 %
IMM GRANULOCYTES NFR BLD: 0.5 %
IMM GRANULOCYTES NFR BLD: 0.5 %
IMM GRANULOCYTES NFR BLD: 0.6 %
IMMUNOGLOBULIN PNL SER-MCNC: 579 MG/DL (ref 791–1643)
INR BLD: 1.09 (ref 0.9–1.1)
INR BLD: 1.11 (ref 0.9–1.1)
INR BLD: 1.15 (ref 0.9–1.1)
INR BLD: 1.15 (ref 0.9–1.1)
INR BLD: 1.16 (ref 0.9–1.1)
INR BLD: 1.17 (ref 0.9–1.1)
INR BLD: 1.21 (ref 0.9–1.1)
INR BLD: 1.69 (ref 0.9–1.1)
INR BLD: 1.7 (ref 0.9–1.1)
INR BLD: 2.27 (ref 0.9–1.1)
INR BLD: 2.32 (ref 0.9–1.1)
INR BLD: 2.72 (ref 0.9–1.1)
INR BLD: 2.74 (ref 0.9–1.1)
INR BLD: 2.84 (ref 0.9–1.1)
INR BLD: 2.88 (ref 0.9–1.1)
INR BLD: 3.09 (ref 0.9–1.1)
INR BLD: 3.42 (ref 0.9–1.1)
INR BLD: 4.5 (ref 0.9–1.1)
INR BLD: 5.08 (ref 0.9–1.1)
INR BLD: 5.74 (ref 0.9–1.1)
KAPPA FREE LIGHT CHAIN: 1.78 MG/DL (ref 0.33–1.94)
KAPPA/LAMBDA FLC RATIO: 1.22 (ref 0.26–1.65)
KETONES UR STRIP.AUTO-MCNC: NEGATIVE MG/DL
KETONES UR STRIP.AUTO-MCNC: NEGATIVE MG/DL
L/M: 10 L/MIN
LACTATE SERPL-SCNC: 1 MMOL/L (ref 0.4–2)
LAMBDA FREE LIGHT CHAIN: 1.45 MG/DL (ref 0.57–2.63)
LEUKOCYTE ESTERASE UR QL STRIP.AUTO: NEGATIVE
LEUKOCYTE ESTERASE UR QL STRIP.AUTO: NEGATIVE
LITHIUM SERPL-SCNC: 0.5 MMOL/L (ref 0.6–1.2)
LITHIUM SERPL-SCNC: 0.5 MMOL/L (ref 0.6–1.2)
LITHIUM SERPL-SCNC: 0.7 MMOL/L (ref 0.6–1.2)
LITHIUM SERPL-SCNC: 0.9 MMOL/L (ref 0.6–1.2)
LITHIUM SERPL-SCNC: 0.9 MMOL/L (ref 0.6–1.2)
LITHIUM SERPL-SCNC: 1 MMOL/L (ref 0.6–1.2)
LITHIUM SERPL-SCNC: 1.1 MMOL/L (ref 0.6–1.2)
LITHIUM SERPL-SCNC: 1.4 MMOL/L (ref 0.6–1.2)
LITHIUM SERPL-SCNC: 1.4 MMOL/L (ref 0.6–1.2)
LYMPHOCYTES # BLD AUTO: 0.7 X10(3) UL (ref 1–4)
LYMPHOCYTES # BLD AUTO: 0.88 X10(3) UL (ref 1–4)
LYMPHOCYTES # BLD AUTO: 1.06 X10(3) UL (ref 1–4)
LYMPHOCYTES # BLD AUTO: 1.11 X10(3) UL (ref 1–4)
LYMPHOCYTES # BLD AUTO: 1.13 X10(3) UL (ref 1–4)
LYMPHOCYTES # BLD AUTO: 1.14 X10(3) UL (ref 1–4)
LYMPHOCYTES NFR BLD AUTO: 12.2 %
LYMPHOCYTES NFR BLD AUTO: 5.1 %
LYMPHOCYTES NFR BLD AUTO: 5.2 %
LYMPHOCYTES NFR BLD AUTO: 5.5 %
Lab: YES
M PROTEIN MFR SERPL ELPH: 5.9 G/DL (ref 6.4–8.2)
M PROTEIN MFR SERPL ELPH: 6.4 G/DL (ref 6.4–8.2)
M PROTEIN MFR SERPL ELPH: 6.7 G/DL (ref 6.4–8.2)
M PROTEIN MFR SERPL ELPH: 6.9 G/DL (ref 6.4–8.2)
M PROTEIN MFR SERPL ELPH: 7.3 G/DL (ref 6.4–8.2)
MCH RBC QN AUTO: 28.4 PG (ref 26–34)
MCH RBC QN AUTO: 28.4 PG (ref 26–34)
MCH RBC QN AUTO: 28.5 PG (ref 26–34)
MCH RBC QN AUTO: 28.5 PG (ref 26–34)
MCH RBC QN AUTO: 28.6 PG (ref 26–34)
MCH RBC QN AUTO: 28.7 PG (ref 26–34)
MCH RBC QN AUTO: 28.7 PG (ref 26–34)
MCH RBC QN AUTO: 28.8 PG (ref 26–34)
MCH RBC QN AUTO: 28.9 PG (ref 26–34)
MCH RBC QN AUTO: 29 PG (ref 26–34)
MCH RBC QN AUTO: 29.2 PG (ref 26–34)
MCH RBC QN AUTO: 29.3 PG (ref 26–34)
MCH RBC QN AUTO: 29.4 PG (ref 26–34)
MCH RBC QN AUTO: 29.4 PG (ref 26–34)
MCHC RBC AUTO-ENTMCNC: 29.5 G/DL (ref 31–37)
MCHC RBC AUTO-ENTMCNC: 29.5 G/DL (ref 31–37)
MCHC RBC AUTO-ENTMCNC: 29.9 G/DL (ref 31–37)
MCHC RBC AUTO-ENTMCNC: 30.1 G/DL (ref 31–37)
MCHC RBC AUTO-ENTMCNC: 30.2 G/DL (ref 31–37)
MCHC RBC AUTO-ENTMCNC: 30.2 G/DL (ref 31–37)
MCHC RBC AUTO-ENTMCNC: 30.6 G/DL (ref 31–37)
MCHC RBC AUTO-ENTMCNC: 31.4 G/DL (ref 31–37)
MCHC RBC AUTO-ENTMCNC: 31.4 G/DL (ref 31–37)
MCHC RBC AUTO-ENTMCNC: 31.5 G/DL (ref 31–37)
MCHC RBC AUTO-ENTMCNC: 31.5 G/DL (ref 31–37)
MCHC RBC AUTO-ENTMCNC: 31.6 G/DL (ref 31–37)
MCHC RBC AUTO-ENTMCNC: 31.6 G/DL (ref 31–37)
MCHC RBC AUTO-ENTMCNC: 32 G/DL (ref 31–37)
MCHC RBC AUTO-ENTMCNC: 32.4 G/DL (ref 31–37)
MCHC RBC AUTO-ENTMCNC: 32.8 G/DL (ref 31–37)
MCV RBC AUTO: 89.7 FL (ref 80–100)
MCV RBC AUTO: 89.7 FL (ref 80–100)
MCV RBC AUTO: 90.2 FL (ref 80–100)
MCV RBC AUTO: 91 FL (ref 80–100)
MCV RBC AUTO: 91.4 FL (ref 80–100)
MCV RBC AUTO: 91.5 FL (ref 80–100)
MCV RBC AUTO: 92.2 FL (ref 80–100)
MCV RBC AUTO: 92.2 FL (ref 80–100)
MCV RBC AUTO: 93.1 FL (ref 80–100)
MCV RBC AUTO: 93.9 FL (ref 80–100)
MCV RBC AUTO: 94.1 FL (ref 80–100)
MCV RBC AUTO: 95.1 FL (ref 80–100)
MCV RBC AUTO: 95.1 FL (ref 80–100)
MCV RBC AUTO: 96.7 FL (ref 80–100)
MCV RBC AUTO: 96.7 FL (ref 80–100)
MCV RBC AUTO: 96.9 FL (ref 80–100)
METHEMOGLOBIN: 0.5 % SAT (ref 0.4–1.5)
MMA: 0.23 UMOL/L
MONOCYTES # BLD AUTO: 0.37 X10(3) UL (ref 0.1–1)
MONOCYTES # BLD AUTO: 0.5 X10(3) UL (ref 0.1–1)
MONOCYTES # BLD AUTO: 0.64 X10(3) UL (ref 0.1–1)
MONOCYTES # BLD AUTO: 0.7 X10(3) UL (ref 0.1–1)
MONOCYTES # BLD AUTO: 0.79 X10(3) UL (ref 0.1–1)
MONOCYTES # BLD AUTO: 1.31 X10(3) UL (ref 0.1–1)
MONOCYTES NFR BLD AUTO: 3.4 %
MONOCYTES NFR BLD AUTO: 3.7 %
MONOCYTES NFR BLD AUTO: 3.7 %
MONOCYTES NFR BLD AUTO: 3.9 %
MONOCYTES NFR BLD AUTO: 4 %
MONOCYTES NFR BLD AUTO: 6.9 %
NEUTROPHILS # BLD AUTO: 11.91 X10 (3) UL (ref 1.5–7.7)
NEUTROPHILS # BLD AUTO: 11.91 X10(3) UL (ref 1.5–7.7)
NEUTROPHILS # BLD AUTO: 15.78 X10 (3) UL (ref 1.5–7.7)
NEUTROPHILS # BLD AUTO: 15.78 X10(3) UL (ref 1.5–7.7)
NEUTROPHILS # BLD AUTO: 16.59 X10 (3) UL (ref 1.5–7.7)
NEUTROPHILS # BLD AUTO: 16.59 X10(3) UL (ref 1.5–7.7)
NEUTROPHILS # BLD AUTO: 18.11 X10 (3) UL (ref 1.5–7.7)
NEUTROPHILS # BLD AUTO: 18.11 X10(3) UL (ref 1.5–7.7)
NEUTROPHILS # BLD AUTO: 18.79 X10 (3) UL (ref 1.5–7.7)
NEUTROPHILS # BLD AUTO: 18.79 X10(3) UL (ref 1.5–7.7)
NEUTROPHILS # BLD AUTO: 7.53 X10 (3) UL (ref 1.5–7.7)
NEUTROPHILS # BLD AUTO: 7.53 X10(3) UL (ref 1.5–7.7)
NEUTROPHILS NFR BLD AUTO: 81.6 %
NEUTROPHILS NFR BLD AUTO: 86.7 %
NEUTROPHILS NFR BLD AUTO: 88.3 %
NEUTROPHILS NFR BLD AUTO: 89.8 %
NEUTROPHILS NFR BLD AUTO: 90.4 %
NEUTROPHILS NFR BLD AUTO: 90.5 %
NITRITE UR QL STRIP.AUTO: NEGATIVE
NITRITE UR QL STRIP.AUTO: NEGATIVE
OPIATES UR QL SCN: NEGATIVE
OSMOLALITY SERPL CALC.SUM OF ELEC: 291 MOSM/KG (ref 275–295)
OSMOLALITY SERPL CALC.SUM OF ELEC: 301 MOSM/KG (ref 275–295)
OSMOLALITY SERPL CALC.SUM OF ELEC: 302 MOSM/KG (ref 275–295)
OSMOLALITY SERPL CALC.SUM OF ELEC: 303 MOSM/KG (ref 275–295)
OSMOLALITY SERPL CALC.SUM OF ELEC: 303 MOSM/KG (ref 275–295)
OSMOLALITY SERPL CALC.SUM OF ELEC: 308 MOSM/KG (ref 275–295)
OSMOLALITY SERPL CALC.SUM OF ELEC: 309 MOSM/KG (ref 275–295)
OSMOLALITY SERPL CALC.SUM OF ELEC: 310 MOSM/KG (ref 275–295)
OSMOLALITY SERPL CALC.SUM OF ELEC: 310 MOSM/KG (ref 275–295)
OSMOLALITY SERPL CALC.SUM OF ELEC: 312 MOSM/KG (ref 275–295)
OSMOLALITY SERPL CALC.SUM OF ELEC: 313 MOSM/KG (ref 275–295)
OSMOLALITY SERPL CALC.SUM OF ELEC: 313 MOSM/KG (ref 275–295)
OSMOLALITY SERPL CALC.SUM OF ELEC: 314 MOSM/KG (ref 275–295)
OSMOLALITY SERPL CALC.SUM OF ELEC: 315 MOSM/KG (ref 275–295)
OSMOLALITY SERPL CALC.SUM OF ELEC: 316 MOSM/KG (ref 275–295)
OSMOLALITY SERPL CALC.SUM OF ELEC: 320 MOSM/KG (ref 275–295)
OSMOLALITY SERPL CALC.SUM OF ELEC: 320 MOSM/KG (ref 275–295)
OSMOLALITY SERPL CALC.SUM OF ELEC: 326 MOSM/KG (ref 275–295)
OSMOLALITY SERPL CALC.SUM OF ELEC: 330 MOSM/KG (ref 275–295)
OSMOLALITY SERPL CALC.SUM OF ELEC: 331 MOSM/KG (ref 275–295)
OSMOLALITY SERPL CALC.SUM OF ELEC: 333 MOSM/KG (ref 275–295)
OSMOLALITY SERPL CALC.SUM OF ELEC: 334 MOSM/KG (ref 275–295)
OSMOLALITY SERPL CALC.SUM OF ELEC: 337 MOSM/KG (ref 275–295)
OSMOLALITY SERPL CALC.SUM OF ELEC: 337 MOSM/KG (ref 275–295)
OSMOLALITY SERPL CALC.SUM OF ELEC: 340 MOSM/KG (ref 275–295)
OSMOLALITY SERPL CALC.SUM OF ELEC: 342 MOSM/KG (ref 275–295)
OSMOLALITY SERPL CALC.SUM OF ELEC: 343 MOSM/KG (ref 275–295)
OSMOLALITY SERPL CALC.SUM OF ELEC: 345 MOSM/KG (ref 275–295)
OSMOLALITY SERPL CALC.SUM OF ELEC: 346 MOSM/KG (ref 275–295)
OSMOLALITY SERPL CALC.SUM OF ELEC: 346 MOSM/KG (ref 275–295)
OSMOLALITY SERPL CALC.SUM OF ELEC: 348 MOSM/KG (ref 275–295)
OSMOLALITY UR: 147 MOSM/KG (ref 300–1300)
P AXIS: 50 DEGREES
P AXIS: 63 DEGREES
P AXIS: 66 DEGREES
P-R INTERVAL: 190 MS
P-R INTERVAL: 196 MS
P-R INTERVAL: 226 MS
P/F RATIO: 296.1 MMHG
PATIENT TEMPERATURE: 98.6 F
PATIENT TEMPERATURE: 98.7 F
PATIENT TEMPERATURE: <70 F
PCP UR QL SCN: NEGATIVE
PEEP: 5 CM H2O
PH UR STRIP.AUTO: 6 [PH] (ref 4.5–8)
PH UR STRIP.AUTO: 7 [PH] (ref 4.5–8)
PHOSPHATE SERPL-MCNC: 2 MG/DL (ref 2.5–4.9)
PHOSPHATE SERPL-MCNC: 2.8 MG/DL (ref 2.5–4.9)
PHOSPHATE SERPL-MCNC: 2.9 MG/DL (ref 2.5–4.9)
PHOSPHATE SERPL-MCNC: 3 MG/DL (ref 2.5–4.9)
PHOSPHATE SERPL-MCNC: 3.4 MG/DL (ref 2.5–4.9)
PHOSPHATE SERPL-MCNC: 3.7 MG/DL (ref 2.5–4.9)
PHOSPHATE SERPL-MCNC: 3.7 MG/DL (ref 2.5–4.9)
PHOSPHATE SERPL-MCNC: 4 MG/DL (ref 2.5–4.9)
PHOSPHATE SERPL-MCNC: 4.4 MG/DL (ref 2.5–4.9)
PHOSPHATE SERPL-MCNC: 4.5 MG/DL (ref 2.5–4.9)
PHOSPHATE SERPL-MCNC: 5 MG/DL (ref 2.5–4.9)
PHOSPHATE SERPL-MCNC: 5.4 MG/DL (ref 2.5–4.9)
PHOSPHATE SERPL-MCNC: 6.2 MG/DL (ref 2.5–4.9)
PLATELET # BLD AUTO: 123 10(3)UL (ref 150–450)
PLATELET # BLD AUTO: 177 10(3)UL (ref 150–450)
PLATELET # BLD AUTO: 193 10(3)UL (ref 150–450)
PLATELET # BLD AUTO: 193 10(3)UL (ref 150–450)
PLATELET # BLD AUTO: 197 10(3)UL (ref 150–450)
PLATELET # BLD AUTO: 197 10(3)UL (ref 150–450)
PLATELET # BLD AUTO: 201 10(3)UL (ref 150–450)
PLATELET # BLD AUTO: 210 10(3)UL (ref 150–450)
PLATELET # BLD AUTO: 218 10(3)UL (ref 150–450)
PLATELET # BLD AUTO: 219 10(3)UL (ref 150–450)
PLATELET # BLD AUTO: 230 10(3)UL (ref 150–450)
PLATELET # BLD AUTO: 232 10(3)UL (ref 150–450)
PLATELET # BLD AUTO: 238 10(3)UL (ref 150–450)
PLATELET # BLD AUTO: 246 10(3)UL (ref 150–450)
PLATELET # BLD AUTO: 254 10(3)UL (ref 150–450)
PLATELET # BLD AUTO: 283 10(3)UL (ref 150–450)
POTASSIUM SERPL-SCNC: 3.2 MMOL/L (ref 3.5–5.1)
POTASSIUM SERPL-SCNC: 3.2 MMOL/L (ref 3.5–5.1)
POTASSIUM SERPL-SCNC: 3.3 MMOL/L (ref 3.5–5.1)
POTASSIUM SERPL-SCNC: 3.5 MMOL/L (ref 3.5–5.1)
POTASSIUM SERPL-SCNC: 3.6 MMOL/L (ref 3.5–5.1)
POTASSIUM SERPL-SCNC: 3.6 MMOL/L (ref 3.5–5.1)
POTASSIUM SERPL-SCNC: 3.7 MMOL/L (ref 3.5–5.1)
POTASSIUM SERPL-SCNC: 3.8 MMOL/L (ref 3.5–5.1)
POTASSIUM SERPL-SCNC: 3.8 MMOL/L (ref 3.5–5.1)
POTASSIUM SERPL-SCNC: 3.9 MMOL/L (ref 3.5–5.1)
POTASSIUM SERPL-SCNC: 3.9 MMOL/L (ref 3.5–5.1)
POTASSIUM SERPL-SCNC: 4.1 MMOL/L (ref 3.5–5.1)
POTASSIUM SERPL-SCNC: 4.2 MMOL/L (ref 3.5–5.1)
POTASSIUM SERPL-SCNC: 4.4 MMOL/L (ref 3.5–5.1)
POTASSIUM SERPL-SCNC: 4.4 MMOL/L (ref 3.5–5.1)
POTASSIUM SERPL-SCNC: 4.5 MMOL/L (ref 3.5–5.1)
POTASSIUM SERPL-SCNC: 4.6 MMOL/L (ref 3.5–5.1)
POTASSIUM SERPL-SCNC: 4.7 MMOL/L (ref 3.5–5.1)
POTASSIUM SERPL-SCNC: 4.7 MMOL/L (ref 3.5–5.1)
POTASSIUM SERPL-SCNC: 5.2 MMOL/L (ref 3.5–5.1)
POTASSIUM SERPL-SCNC: 5.5 MMOL/L (ref 3.5–5.1)
PROT UR STRIP.AUTO-MCNC: NEGATIVE MG/DL
PROT UR STRIP.AUTO-MCNC: NEGATIVE MG/DL
PSA SERPL DL<=0.01 NG/ML-MCNC: 14.6 SECONDS (ref 12.5–14.7)
PSA SERPL DL<=0.01 NG/ML-MCNC: 14.8 SECONDS (ref 12.5–14.7)
PSA SERPL DL<=0.01 NG/ML-MCNC: 15.2 SECONDS (ref 12.5–14.7)
PSA SERPL DL<=0.01 NG/ML-MCNC: 15.2 SECONDS (ref 12.5–14.7)
PSA SERPL DL<=0.01 NG/ML-MCNC: 15.3 SECONDS (ref 12.5–14.7)
PSA SERPL DL<=0.01 NG/ML-MCNC: 15.4 SECONDS (ref 12.5–14.7)
PSA SERPL DL<=0.01 NG/ML-MCNC: 15.9 SECONDS (ref 12.5–14.7)
PSA SERPL DL<=0.01 NG/ML-MCNC: 20.8 SECONDS (ref 12.5–14.7)
PSA SERPL DL<=0.01 NG/ML-MCNC: 20.9 SECONDS (ref 12.5–14.7)
PSA SERPL DL<=0.01 NG/ML-MCNC: 26.5 SECONDS (ref 12.5–14.7)
PSA SERPL DL<=0.01 NG/ML-MCNC: 27 SECONDS (ref 12.5–14.7)
PSA SERPL DL<=0.01 NG/ML-MCNC: 30.7 SECONDS (ref 12.5–14.7)
PSA SERPL DL<=0.01 NG/ML-MCNC: 30.9 SECONDS (ref 12.5–14.7)
PSA SERPL DL<=0.01 NG/ML-MCNC: 31.8 SECONDS (ref 12.5–14.7)
PSA SERPL DL<=0.01 NG/ML-MCNC: 32.1 SECONDS (ref 12.5–14.7)
PSA SERPL DL<=0.01 NG/ML-MCNC: 34 SECONDS (ref 12.5–14.7)
PSA SERPL DL<=0.01 NG/ML-MCNC: 37 SECONDS (ref 12.5–14.7)
PSA SERPL DL<=0.01 NG/ML-MCNC: 46.2 SECONDS (ref 12.5–14.7)
PSA SERPL DL<=0.01 NG/ML-MCNC: 51 SECONDS (ref 12.5–14.7)
PSA SERPL DL<=0.01 NG/ML-MCNC: 56.3 SECONDS (ref 12.5–14.7)
Q-T INTERVAL: 422 MS
Q-T INTERVAL: 440 MS
Q-T INTERVAL: 456 MS
QRS DURATION: 90 MS
QRS DURATION: 94 MS
QRS DURATION: 94 MS
QTC CALCULATION (BEZET): 481 MS
QTC CALCULATION (BEZET): 495 MS
QTC CALCULATION (BEZET): 507 MS
R AXIS: -64 DEGREES
R AXIS: 11 DEGREES
R AXIS: 26 DEGREES
RBC # BLD AUTO: 4.22 X10(6)UL (ref 3.8–5.3)
RBC # BLD AUTO: 4.51 X10(6)UL (ref 3.8–5.3)
RBC # BLD AUTO: 4.72 X10(6)UL (ref 3.8–5.3)
RBC # BLD AUTO: 4.84 X10(6)UL (ref 3.8–5.3)
RBC # BLD AUTO: 4.84 X10(6)UL (ref 3.8–5.3)
RBC # BLD AUTO: 5.49 X10(6)UL (ref 3.8–5.3)
RBC # BLD AUTO: 5.6 X10(6)UL (ref 3.8–5.3)
RBC # BLD AUTO: 5.62 X10(6)UL (ref 3.8–5.3)
RBC # BLD AUTO: 5.63 X10(6)UL (ref 3.8–5.3)
RBC # BLD AUTO: 5.63 X10(6)UL (ref 3.8–5.3)
RBC # BLD AUTO: 5.65 X10(6)UL (ref 3.8–5.3)
RBC # BLD AUTO: 5.78 X10(6)UL (ref 3.8–5.3)
RBC # BLD AUTO: 5.79 X10(6)UL (ref 3.8–5.3)
RBC # BLD AUTO: 6.03 X10(6)UL (ref 3.8–5.3)
RBC # BLD AUTO: 6.12 X10(6)UL (ref 3.8–5.3)
RBC # BLD AUTO: 6.14 X10(6)UL (ref 3.8–5.3)
RBC UR QL AUTO: NEGATIVE
RBC UR QL AUTO: NEGATIVE
RH BLOOD TYPE: POSITIVE
SED RATE-ML: 4 MM/HR (ref 0–25)
SODIUM SERPL-SCNC: 141 MMOL/L (ref 136–145)
SODIUM SERPL-SCNC: 142 MMOL/L (ref 136–145)
SODIUM SERPL-SCNC: 143 MMOL/L (ref 136–145)
SODIUM SERPL-SCNC: 144 MMOL/L (ref 136–145)
SODIUM SERPL-SCNC: 145 MMOL/L (ref 136–145)
SODIUM SERPL-SCNC: 146 MMOL/L (ref 136–145)
SODIUM SERPL-SCNC: 146 MMOL/L (ref 136–145)
SODIUM SERPL-SCNC: 147 MMOL/L (ref 136–145)
SODIUM SERPL-SCNC: 149 MMOL/L (ref 136–145)
SODIUM SERPL-SCNC: 150 MMOL/L (ref 136–145)
SODIUM SERPL-SCNC: 151 MMOL/L (ref 136–145)
SODIUM SERPL-SCNC: 157 MMOL/L (ref 136–145)
SODIUM SERPL-SCNC: 159 MMOL/L (ref 136–145)
SODIUM SERPL-SCNC: 159 MMOL/L (ref 136–145)
SODIUM SERPL-SCNC: 161 MMOL/L (ref 136–145)
SODIUM SERPL-SCNC: 162 MMOL/L (ref 136–145)
SODIUM SERPL-SCNC: 24 MMOL/L
SODIUM SERPL-SCNC: 27 MMOL/L
SODIUM SERPL-SCNC: 35 MMOL/L
SP GR UR STRIP.AUTO: 1 (ref 1–1.03)
SP GR UR STRIP.AUTO: <1.005 (ref 1–1.03)
T AXIS: 60 DEGREES
T AXIS: 82 DEGREES
T AXIS: 85 DEGREES
TIDAL VOLUME: 450 ML
TOPIRAMATE: <1.5 UG/ML
TOTAL HEMOGLOBIN: 13.2 G/DL (ref 11.7–16)
TOTAL HEMOGLOBIN: 15 G/DL (ref 11.7–16)
TOTAL HEMOGLOBIN: 15.3 G/DL (ref 11.7–16)
UROBILINOGEN UR STRIP.AUTO-MCNC: <2 MG/DL
UROBILINOGEN UR STRIP.AUTO-MCNC: <2 MG/DL
UUN UR-MCNC: 208 MG/DL
VENOUS BASE EXCESS: -2.3
VENOUS BLOOD GAS HCO3: 23.7 MEQ/L (ref 23–27)
VENOUS O2 SAT CALC: 57 % (ref 72–78)
VENOUS O2 SATURATION: 58 % (ref 72–78)
VENOUS PCO2: 45 MM HG (ref 38–50)
VENOUS PH: 7.34 (ref 7.33–7.43)
VENOUS PO2: 32 MM HG (ref 30–50)
VENT RATE: 12 /MIN
VENTRICULAR RATE: 67 BPM
VENTRICULAR RATE: 80 BPM
VENTRICULAR RATE: 83 BPM
VIT B12 SERPL-MCNC: 408 PG/ML (ref 193–986)
VITAMIN B1 (THIAMINE), WHOLE B: 236 NMOL/L
WBC # BLD AUTO: 13.5 X10(3) UL (ref 4–11)
WBC # BLD AUTO: 13.5 X10(3) UL (ref 4–11)
WBC # BLD AUTO: 13.7 X10(3) UL (ref 4–11)
WBC # BLD AUTO: 14 X10(3) UL (ref 4–11)
WBC # BLD AUTO: 14.7 X10(3) UL (ref 4–11)
WBC # BLD AUTO: 16.4 X10(3) UL (ref 4–11)
WBC # BLD AUTO: 17.4 X10(3) UL (ref 4–11)
WBC # BLD AUTO: 17.4 X10(3) UL (ref 4–11)
WBC # BLD AUTO: 18.4 X10(3) UL (ref 4–11)
WBC # BLD AUTO: 19.1 X10(3) UL (ref 4–11)
WBC # BLD AUTO: 19.8 X10(3) UL (ref 4–11)
WBC # BLD AUTO: 20.2 X10(3) UL (ref 4–11)
WBC # BLD AUTO: 20.8 X10(3) UL (ref 4–11)
WBC # BLD AUTO: 7.7 X10(3) UL (ref 4–11)
WBC # BLD AUTO: 9.2 X10(3) UL (ref 4–11)
WBC # BLD AUTO: 9.7 X10(3) UL (ref 4–11)

## 2019-01-01 PROCEDURE — 99232 SBSQ HOSP IP/OBS MODERATE 35: CPT | Performed by: OTHER

## 2019-01-01 PROCEDURE — 71045 X-RAY EXAM CHEST 1 VIEW: CPT | Performed by: NURSE PRACTITIONER

## 2019-01-01 PROCEDURE — 76770 US EXAM ABDO BACK WALL COMP: CPT | Performed by: INTERNAL MEDICINE

## 2019-01-01 PROCEDURE — 99225 SUBSEQUENT OBSERVATION CARE: CPT | Performed by: OTHER

## 2019-01-01 PROCEDURE — 71045 X-RAY EXAM CHEST 1 VIEW: CPT | Performed by: EMERGENCY MEDICINE

## 2019-01-01 PROCEDURE — 71045 X-RAY EXAM CHEST 1 VIEW: CPT | Performed by: INTERNAL MEDICINE

## 2019-01-01 PROCEDURE — 83605 ASSAY OF LACTIC ACID: CPT | Performed by: EMERGENCY MEDICINE

## 2019-01-01 PROCEDURE — 70450 CT HEAD/BRAIN W/O DYE: CPT | Performed by: PHYSICIAN ASSISTANT

## 2019-01-01 PROCEDURE — 0DTF0ZZ RESECTION OF RIGHT LARGE INTESTINE, OPEN APPROACH: ICD-10-PCS | Performed by: SURGERY

## 2019-01-01 PROCEDURE — 99285 EMERGENCY DEPT VISIT HI MDM: CPT

## 2019-01-01 PROCEDURE — 74019 RADEX ABDOMEN 2 VIEWS: CPT | Performed by: INTERNAL MEDICINE

## 2019-01-01 PROCEDURE — 99284 EMERGENCY DEPT VISIT MOD MDM: CPT

## 2019-01-01 PROCEDURE — 0D1B0Z4 BYPASS ILEUM TO CUTANEOUS, OPEN APPROACH: ICD-10-PCS | Performed by: SURGERY

## 2019-01-01 PROCEDURE — 5A09557 ASSISTANCE WITH RESPIRATORY VENTILATION, GREATER THAN 96 CONSECUTIVE HOURS, CONTINUOUS POSITIVE AIRWAY PRESSURE: ICD-10-PCS | Performed by: HOSPITALIST

## 2019-01-01 PROCEDURE — 70450 CT HEAD/BRAIN W/O DYE: CPT | Performed by: EMERGENCY MEDICINE

## 2019-01-01 PROCEDURE — 36600 WITHDRAWAL OF ARTERIAL BLOOD: CPT | Performed by: EMERGENCY MEDICINE

## 2019-01-01 PROCEDURE — 36430 TRANSFUSION BLD/BLD COMPNT: CPT | Performed by: ANESTHESIOLOGY

## 2019-01-01 PROCEDURE — 70551 MRI BRAIN STEM W/O DYE: CPT | Performed by: OTHER

## 2019-01-01 PROCEDURE — 85610 PROTHROMBIN TIME: CPT | Performed by: PHYSICIAN ASSISTANT

## 2019-01-01 PROCEDURE — 30233K1 TRANSFUSION OF NONAUTOLOGOUS FROZEN PLASMA INTO PERIPHERAL VEIN, PERCUTANEOUS APPROACH: ICD-10-PCS | Performed by: SURGERY

## 2019-01-01 PROCEDURE — 85025 COMPLETE CBC W/AUTO DIFF WBC: CPT | Performed by: PHYSICIAN ASSISTANT

## 2019-01-01 PROCEDURE — 90792 PSYCH DIAG EVAL W/MED SRVCS: CPT | Performed by: OTHER

## 2019-01-01 PROCEDURE — 84132 ASSAY OF SERUM POTASSIUM: CPT | Performed by: EMERGENCY MEDICINE

## 2019-01-01 PROCEDURE — 85018 HEMOGLOBIN: CPT | Performed by: EMERGENCY MEDICINE

## 2019-01-01 PROCEDURE — 80053 COMPREHEN METABOLIC PANEL: CPT | Performed by: PHYSICIAN ASSISTANT

## 2019-01-01 PROCEDURE — 36415 COLL VENOUS BLD VENIPUNCTURE: CPT

## 2019-01-01 PROCEDURE — 93005 ELECTROCARDIOGRAM TRACING: CPT

## 2019-01-01 PROCEDURE — 0D1K0Z4 BYPASS ASCENDING COLON TO CUTANEOUS, OPEN APPROACH: ICD-10-PCS | Performed by: SURGERY

## 2019-01-01 PROCEDURE — 93010 ELECTROCARDIOGRAM REPORT: CPT | Performed by: PHYSICIAN ASSISTANT

## 2019-01-01 PROCEDURE — 83050 HGB METHEMOGLOBIN QUAN: CPT | Performed by: EMERGENCY MEDICINE

## 2019-01-01 PROCEDURE — 82803 BLOOD GASES ANY COMBINATION: CPT | Performed by: EMERGENCY MEDICINE

## 2019-01-01 PROCEDURE — 84484 ASSAY OF TROPONIN QUANT: CPT | Performed by: PHYSICIAN ASSISTANT

## 2019-01-01 PROCEDURE — 74176 CT ABD & PELVIS W/O CONTRAST: CPT | Performed by: INTERNAL MEDICINE

## 2019-01-01 PROCEDURE — 94640 AIRWAY INHALATION TREATMENT: CPT

## 2019-01-01 PROCEDURE — 82330 ASSAY OF CALCIUM: CPT | Performed by: EMERGENCY MEDICINE

## 2019-01-01 PROCEDURE — 74018 RADEX ABDOMEN 1 VIEW: CPT | Performed by: INTERNAL MEDICINE

## 2019-01-01 PROCEDURE — 83690 ASSAY OF LIPASE: CPT | Performed by: PHYSICIAN ASSISTANT

## 2019-01-01 PROCEDURE — 82375 ASSAY CARBOXYHB QUANT: CPT | Performed by: EMERGENCY MEDICINE

## 2019-01-01 PROCEDURE — 84295 ASSAY OF SERUM SODIUM: CPT | Performed by: EMERGENCY MEDICINE

## 2019-01-01 PROCEDURE — 72125 CT NECK SPINE W/O DYE: CPT | Performed by: EMERGENCY MEDICINE

## 2019-01-01 PROCEDURE — 73610 X-RAY EXAM OF ANKLE: CPT | Performed by: EMERGENCY MEDICINE

## 2019-01-01 PROCEDURE — 72110 X-RAY EXAM L-2 SPINE 4/>VWS: CPT | Performed by: EMERGENCY MEDICINE

## 2019-01-01 PROCEDURE — 85730 THROMBOPLASTIN TIME PARTIAL: CPT | Performed by: PHYSICIAN ASSISTANT

## 2019-01-01 PROCEDURE — 71045 X-RAY EXAM CHEST 1 VIEW: CPT | Performed by: PHYSICIAN ASSISTANT

## 2019-01-01 RX ORDER — DEXTROSE MONOHYDRATE 50 MG/ML
INJECTION, SOLUTION INTRAVENOUS CONTINUOUS
Status: DISCONTINUED | OUTPATIENT
Start: 2019-01-01 | End: 2019-01-01

## 2019-01-01 RX ORDER — LITHIUM CARBONATE 300 MG/1
600 TABLET, FILM COATED, EXTENDED RELEASE ORAL DAILY
Status: DISCONTINUED | OUTPATIENT
Start: 2019-01-01 | End: 2019-01-01

## 2019-01-01 RX ORDER — ERGOCALCIFEROL 1.25 MG/1
50000 CAPSULE ORAL
Status: ON HOLD | COMMUNITY
End: 2020-01-01

## 2019-01-01 RX ORDER — NALOXONE HYDROCHLORIDE 0.4 MG/ML
80 INJECTION, SOLUTION INTRAMUSCULAR; INTRAVENOUS; SUBCUTANEOUS AS NEEDED
Status: ACTIVE | OUTPATIENT
Start: 2019-01-01 | End: 2019-01-01

## 2019-01-01 RX ORDER — POTASSIUM CHLORIDE 14.9 MG/ML
20 INJECTION INTRAVENOUS ONCE
Status: COMPLETED | OUTPATIENT
Start: 2019-01-01 | End: 2019-01-01

## 2019-01-01 RX ORDER — POTASSIUM CHLORIDE 20 MEQ/1
40 TABLET, EXTENDED RELEASE ORAL ONCE
Status: COMPLETED | OUTPATIENT
Start: 2019-01-01 | End: 2019-01-01

## 2019-01-01 RX ORDER — HYDROCHLOROTHIAZIDE 25 MG/1
25 TABLET ORAL DAILY
Status: DISCONTINUED | OUTPATIENT
Start: 2019-01-01 | End: 2019-01-01

## 2019-01-01 RX ORDER — IPRATROPIUM BROMIDE AND ALBUTEROL SULFATE 2.5; .5 MG/3ML; MG/3ML
3 SOLUTION RESPIRATORY (INHALATION) EVERY 6 HOURS PRN
Status: DISCONTINUED | OUTPATIENT
Start: 2019-01-01 | End: 2020-01-01

## 2019-01-01 RX ORDER — ONDANSETRON 2 MG/ML
4 INJECTION INTRAMUSCULAR; INTRAVENOUS EVERY 6 HOURS PRN
Status: DISCONTINUED | OUTPATIENT
Start: 2019-01-01 | End: 2020-01-01

## 2019-01-01 RX ORDER — ACETAMINOPHEN 160 MG/5ML
650 SOLUTION ORAL EVERY 6 HOURS PRN
Status: DISCONTINUED | OUTPATIENT
Start: 2019-01-01 | End: 2020-01-01

## 2019-01-01 RX ORDER — CHLORHEXIDINE GLUCONATE 0.12 MG/ML
15 RINSE ORAL
Status: DISCONTINUED | OUTPATIENT
Start: 2019-01-01 | End: 2020-01-01 | Stop reason: ALTCHOICE

## 2019-01-01 RX ORDER — MAGNESIUM OXIDE 400 MG (241.3 MG MAGNESIUM) TABLET
3 TABLET NIGHTLY PRN
Status: DISCONTINUED | OUTPATIENT
Start: 2019-01-01 | End: 2019-01-01

## 2019-01-01 RX ORDER — PROMETHAZINE HYDROCHLORIDE 25 MG/1
3 TABLET ORAL AS NEEDED
Status: DISCONTINUED | OUTPATIENT
Start: 2019-01-01 | End: 2020-01-01

## 2019-01-01 RX ORDER — POTASSIUM CHLORIDE 20 MEQ/1
40 TABLET, EXTENDED RELEASE ORAL DAILY
Status: DISCONTINUED | OUTPATIENT
Start: 2019-01-01 | End: 2019-01-01

## 2019-01-01 RX ORDER — DIPHENHYDRAMINE HYDROCHLORIDE 50 MG/ML
INJECTION INTRAMUSCULAR; INTRAVENOUS
Status: DISPENSED
Start: 2019-01-01 | End: 2020-01-01

## 2019-01-01 RX ORDER — ROCURONIUM BROMIDE 10 MG/ML
INJECTION, SOLUTION INTRAVENOUS AS NEEDED
Status: DISCONTINUED | OUTPATIENT
Start: 2019-01-01 | End: 2019-01-01 | Stop reason: SURG

## 2019-01-01 RX ORDER — SODIUM CHLORIDE, SODIUM LACTATE, POTASSIUM CHLORIDE, CALCIUM CHLORIDE 600; 310; 30; 20 MG/100ML; MG/100ML; MG/100ML; MG/100ML
INJECTION, SOLUTION INTRAVENOUS CONTINUOUS
Status: DISCONTINUED | OUTPATIENT
Start: 2019-01-01 | End: 2019-01-01

## 2019-01-01 RX ORDER — DEXTROSE MONOHYDRATE 50 MG/ML
INJECTION, SOLUTION INTRAVENOUS CONTINUOUS
Status: DISCONTINUED | OUTPATIENT
Start: 2019-01-01 | End: 2020-01-01

## 2019-01-01 RX ORDER — AMLODIPINE BESYLATE 5 MG/1
10 TABLET ORAL DAILY
Status: DISCONTINUED | OUTPATIENT
Start: 2019-01-01 | End: 2020-01-01

## 2019-01-01 RX ORDER — PREDNISONE 20 MG/1
40 TABLET ORAL DAILY
Qty: 10 TABLET | Refills: 0 | Status: SHIPPED | OUTPATIENT
Start: 2019-01-01 | End: 2019-01-01

## 2019-01-01 RX ORDER — HALOPERIDOL 5 MG/ML
1 INJECTION INTRAMUSCULAR ONCE
Status: COMPLETED | OUTPATIENT
Start: 2019-01-01 | End: 2019-01-01

## 2019-01-01 RX ORDER — DEXMEDETOMIDINE HYDROCHLORIDE 4 UG/ML
INJECTION, SOLUTION INTRAVENOUS CONTINUOUS
Status: DISCONTINUED | OUTPATIENT
Start: 2019-01-01 | End: 2020-01-01 | Stop reason: HOSPADM

## 2019-01-01 RX ORDER — MORPHINE SULFATE 4 MG/ML
6 INJECTION, SOLUTION INTRAMUSCULAR; INTRAVENOUS EVERY 2 HOUR PRN
Status: DISCONTINUED | OUTPATIENT
Start: 2019-01-01 | End: 2020-01-01

## 2019-01-01 RX ORDER — SODIUM CHLORIDE 9 MG/ML
INJECTION, SOLUTION INTRAVENOUS CONTINUOUS
Status: DISCONTINUED | OUTPATIENT
Start: 2019-01-01 | End: 2019-01-01

## 2019-01-01 RX ORDER — LIDOCAINE HYDROCHLORIDE 10 MG/ML
INJECTION, SOLUTION EPIDURAL; INFILTRATION; INTRACAUDAL; PERINEURAL AS NEEDED
Status: DISCONTINUED | OUTPATIENT
Start: 2019-01-01 | End: 2019-01-01 | Stop reason: SURG

## 2019-01-01 RX ORDER — CARVEDILOL 6.25 MG/1
6.25 TABLET ORAL 2 TIMES DAILY WITH MEALS
Status: DISCONTINUED | OUTPATIENT
Start: 2019-01-01 | End: 2020-01-01

## 2019-01-01 RX ORDER — HYDRALAZINE HYDROCHLORIDE 20 MG/ML
10 INJECTION INTRAMUSCULAR; INTRAVENOUS EVERY 6 HOURS PRN
Status: DISCONTINUED | OUTPATIENT
Start: 2019-01-01 | End: 2020-01-01

## 2019-01-01 RX ORDER — BISACODYL 10 MG
10 SUPPOSITORY, RECTAL RECTAL
Status: DISCONTINUED | OUTPATIENT
Start: 2019-01-01 | End: 2019-01-01

## 2019-01-01 RX ORDER — IPRATROPIUM BROMIDE 42 UG/1
1 SPRAY, METERED NASAL 4 TIMES DAILY PRN
Status: DISCONTINUED | OUTPATIENT
Start: 2019-01-01 | End: 2020-01-01

## 2019-01-01 RX ORDER — LACTULOSE 20 G/30ML
20 SOLUTION ORAL 2 TIMES DAILY PRN
Status: DISCONTINUED | OUTPATIENT
Start: 2019-01-01 | End: 2019-01-01

## 2019-01-01 RX ORDER — HYDROMORPHONE HYDROCHLORIDE 1 MG/ML
0.4 INJECTION, SOLUTION INTRAMUSCULAR; INTRAVENOUS; SUBCUTANEOUS EVERY 5 MIN PRN
Status: ACTIVE | OUTPATIENT
Start: 2019-01-01 | End: 2019-01-01

## 2019-01-01 RX ORDER — ALBUTEROL SULFATE 90 UG/1
1-2 AEROSOL, METERED RESPIRATORY (INHALATION) EVERY 6 HOURS PRN
Status: DISCONTINUED | OUTPATIENT
Start: 2019-01-01 | End: 2019-01-01

## 2019-01-01 RX ORDER — SODIUM CHLORIDE 9 MG/ML
INJECTION, SOLUTION INTRAVENOUS ONCE
Status: COMPLETED | OUTPATIENT
Start: 2019-01-01 | End: 2019-01-01

## 2019-01-01 RX ORDER — WARFARIN SODIUM 2 MG/1
2 TABLET ORAL
Status: COMPLETED | OUTPATIENT
Start: 2019-01-01 | End: 2019-01-01

## 2019-01-01 RX ORDER — MONTELUKAST SODIUM 10 MG/1
10 TABLET ORAL NIGHTLY
Status: DISCONTINUED | OUTPATIENT
Start: 2019-01-01 | End: 2019-01-01

## 2019-01-01 RX ORDER — ACETAMINOPHEN 650 MG/1
650 SUPPOSITORY RECTAL EVERY 6 HOURS PRN
Status: DISCONTINUED | OUTPATIENT
Start: 2019-01-01 | End: 2020-01-01

## 2019-01-01 RX ORDER — WARFARIN SODIUM 5 MG/1
5 TABLET ORAL
Status: COMPLETED | OUTPATIENT
Start: 2019-01-01 | End: 2019-01-01

## 2019-01-01 RX ORDER — ACETAMINOPHEN 325 MG/1
650 TABLET ORAL EVERY 6 HOURS PRN
Status: DISCONTINUED | OUTPATIENT
Start: 2019-01-01 | End: 2020-01-01

## 2019-01-01 RX ORDER — BUPRENORPHINE 2 MG/1
2 TABLET SUBLINGUAL EVERY 2 HOUR PRN
Status: DISCONTINUED | OUTPATIENT
Start: 2019-01-01 | End: 2019-01-01

## 2019-01-01 RX ORDER — BACITRACIN 50000 [USP'U]/1
INJECTION, POWDER, LYOPHILIZED, FOR SOLUTION INTRAMUSCULAR AS NEEDED
Status: DISCONTINUED | OUTPATIENT
Start: 2019-01-01 | End: 2019-01-01 | Stop reason: HOSPADM

## 2019-01-01 RX ORDER — ACETAMINOPHEN 325 MG/1
650 TABLET ORAL EVERY 6 HOURS
Status: DISCONTINUED | OUTPATIENT
Start: 2019-01-01 | End: 2019-01-01

## 2019-01-01 RX ORDER — HYDROMORPHONE HYDROCHLORIDE 1 MG/ML
INJECTION, SOLUTION INTRAMUSCULAR; INTRAVENOUS; SUBCUTANEOUS
Status: COMPLETED
Start: 2019-01-01 | End: 2019-01-01

## 2019-01-01 RX ORDER — DIPHENHYDRAMINE HCL 25 MG
25 CAPSULE ORAL ONCE
Status: COMPLETED | OUTPATIENT
Start: 2019-01-01 | End: 2019-01-01

## 2019-01-01 RX ORDER — WARFARIN SODIUM 1 MG/1
1 TABLET ORAL
Status: ON HOLD | COMMUNITY
End: 2020-01-01

## 2019-01-01 RX ORDER — SERTRALINE HYDROCHLORIDE 100 MG/1
100 TABLET, FILM COATED ORAL NIGHTLY
Status: DISCONTINUED | OUTPATIENT
Start: 2019-01-01 | End: 2020-01-01

## 2019-01-01 RX ORDER — AMLODIPINE BESYLATE 5 MG/1
5 TABLET ORAL DAILY
Status: DISCONTINUED | OUTPATIENT
Start: 2019-01-01 | End: 2019-01-01

## 2019-01-01 RX ORDER — AZITHROMYCIN 250 MG/1
TABLET, FILM COATED ORAL
Qty: 1 PACKAGE | Refills: 0 | Status: SHIPPED | OUTPATIENT
Start: 2019-01-01 | End: 2019-01-01

## 2019-01-01 RX ORDER — DEXTROSE AND SODIUM CHLORIDE 5; .2 G/100ML; G/100ML
INJECTION, SOLUTION INTRAVENOUS CONTINUOUS
Status: DISCONTINUED | OUTPATIENT
Start: 2019-01-01 | End: 2019-01-01

## 2019-01-01 RX ORDER — DIPHENHYDRAMINE HYDROCHLORIDE 50 MG/ML
25 INJECTION INTRAMUSCULAR; INTRAVENOUS ONCE
Status: COMPLETED | OUTPATIENT
Start: 2019-01-01 | End: 2019-01-01

## 2019-01-01 RX ORDER — MINERAL OIL 100 G/100G
1 OIL RECTAL ONCE AS NEEDED
Status: ACTIVE | OUTPATIENT
Start: 2019-01-01 | End: 2019-01-01

## 2019-01-01 RX ORDER — SERTRALINE HYDROCHLORIDE 100 MG/1
200 TABLET, FILM COATED ORAL DAILY
Status: DISCONTINUED | OUTPATIENT
Start: 2019-01-01 | End: 2019-01-01

## 2019-01-01 RX ORDER — FENTANYL 12 UG/H
1 PATCH TRANSDERMAL
Status: DISCONTINUED | OUTPATIENT
Start: 2019-01-01 | End: 2019-01-01

## 2019-01-01 RX ORDER — WARFARIN SODIUM 2.5 MG/1
2.5 TABLET ORAL
Status: COMPLETED | OUTPATIENT
Start: 2019-01-01 | End: 2019-01-01

## 2019-01-01 RX ORDER — LITHIUM CARBONATE 600 MG/1
600 CAPSULE ORAL NIGHTLY
Status: DISCONTINUED | OUTPATIENT
Start: 2019-01-01 | End: 2019-01-01

## 2019-01-01 RX ORDER — METOCLOPRAMIDE HYDROCHLORIDE 5 MG/5ML
10 SOLUTION ORAL EVERY 8 HOURS PRN
Status: DISCONTINUED | OUTPATIENT
Start: 2019-01-01 | End: 2019-01-01

## 2019-01-01 RX ORDER — DEXAMETHASONE SODIUM PHOSPHATE 4 MG/ML
VIAL (ML) INJECTION AS NEEDED
Status: DISCONTINUED | OUTPATIENT
Start: 2019-01-01 | End: 2019-01-01 | Stop reason: SURG

## 2019-01-01 RX ORDER — IPRATROPIUM BROMIDE AND ALBUTEROL SULFATE 2.5; .5 MG/3ML; MG/3ML
3 SOLUTION RESPIRATORY (INHALATION) ONCE
Status: COMPLETED | OUTPATIENT
Start: 2019-01-01 | End: 2019-01-01

## 2019-01-01 RX ORDER — AMILORIDE HYDROCHLORIDE 5 MG/1
10 TABLET ORAL DAILY
Status: DISCONTINUED | OUTPATIENT
Start: 2019-01-01 | End: 2019-01-01

## 2019-01-01 RX ORDER — WARFARIN SODIUM 2.5 MG/1
2.5 TABLET ORAL NIGHTLY
Status: ON HOLD | COMMUNITY
End: 2020-01-01

## 2019-01-01 RX ORDER — LABETALOL HYDROCHLORIDE 5 MG/ML
10 INJECTION, SOLUTION INTRAVENOUS EVERY 6 HOURS PRN
Status: DISCONTINUED | OUTPATIENT
Start: 2019-01-01 | End: 2020-01-01

## 2019-01-01 RX ORDER — ONDANSETRON 2 MG/ML
INJECTION INTRAMUSCULAR; INTRAVENOUS AS NEEDED
Status: DISCONTINUED | OUTPATIENT
Start: 2019-01-01 | End: 2019-01-01 | Stop reason: SURG

## 2019-01-01 RX ORDER — BUPRENORPHINE 2 MG/1
4 TABLET SUBLINGUAL ONCE AS NEEDED
Status: DISCONTINUED | OUTPATIENT
Start: 2019-01-01 | End: 2019-01-01

## 2019-01-01 RX ORDER — SODIUM CHLORIDE 450 MG/100ML
INJECTION, SOLUTION INTRAVENOUS CONTINUOUS
Status: DISCONTINUED | OUTPATIENT
Start: 2019-01-01 | End: 2019-01-01

## 2019-01-01 RX ORDER — MAGNESIUM OXIDE 400 MG (241.3 MG MAGNESIUM) TABLET
3 TABLET NIGHTLY
Status: DISCONTINUED | OUTPATIENT
Start: 2019-01-01 | End: 2019-01-01

## 2019-01-01 RX ORDER — MORPHINE SULFATE 4 MG/ML
2 INJECTION, SOLUTION INTRAMUSCULAR; INTRAVENOUS EVERY 2 HOUR PRN
Status: DISCONTINUED | OUTPATIENT
Start: 2019-01-01 | End: 2020-01-01

## 2019-01-01 RX ORDER — TRAMADOL HYDROCHLORIDE 50 MG/1
50 TABLET ORAL EVERY 6 HOURS PRN
Status: DISCONTINUED | OUTPATIENT
Start: 2019-01-01 | End: 2019-01-01

## 2019-01-01 RX ORDER — FLUTICASONE PROPIONATE 50 MCG
1 SPRAY, SUSPENSION (ML) NASAL NIGHTLY PRN
Status: DISCONTINUED | OUTPATIENT
Start: 2019-01-01 | End: 2019-01-01

## 2019-01-01 RX ORDER — LABETALOL HYDROCHLORIDE 5 MG/ML
10 INJECTION, SOLUTION INTRAVENOUS ONCE
Status: DISCONTINUED | OUTPATIENT
Start: 2019-01-01 | End: 2019-01-01

## 2019-01-01 RX ORDER — METOCLOPRAMIDE HYDROCHLORIDE 5 MG/5ML
5 SOLUTION ORAL EVERY 8 HOURS PRN
Status: DISCONTINUED | OUTPATIENT
Start: 2019-01-01 | End: 2019-01-01

## 2019-01-01 RX ORDER — PREDNISONE 20 MG/1
60 TABLET ORAL ONCE
Status: DISCONTINUED | OUTPATIENT
Start: 2019-01-01 | End: 2019-01-01

## 2019-01-01 RX ORDER — ONDANSETRON 2 MG/ML
4 INJECTION INTRAMUSCULAR; INTRAVENOUS AS NEEDED
Status: ACTIVE | OUTPATIENT
Start: 2019-01-01 | End: 2019-01-01

## 2019-01-01 RX ORDER — ONDANSETRON 2 MG/ML
4 INJECTION INTRAMUSCULAR; INTRAVENOUS EVERY 6 HOURS PRN
Status: DISCONTINUED | OUTPATIENT
Start: 2019-01-01 | End: 2019-01-01

## 2019-01-01 RX ORDER — SERTRALINE HYDROCHLORIDE 100 MG/1
200 TABLET, FILM COATED ORAL NIGHTLY
Status: DISCONTINUED | OUTPATIENT
Start: 2019-01-01 | End: 2019-01-01

## 2019-01-01 RX ORDER — LABETALOL 200 MG/1
200 TABLET, FILM COATED ORAL EVERY 12 HOURS SCHEDULED
Status: DISCONTINUED | OUTPATIENT
Start: 2019-01-01 | End: 2019-01-01

## 2019-01-01 RX ORDER — SODIUM CHLORIDE 9 MG/ML
INJECTION, SOLUTION INTRAVENOUS ONCE
Status: DISCONTINUED | OUTPATIENT
Start: 2019-01-01 | End: 2019-01-01

## 2019-01-01 RX ORDER — FENTANYL 25 UG/H
1 PATCH TRANSDERMAL
Status: DISCONTINUED | OUTPATIENT
Start: 2019-01-01 | End: 2019-01-01

## 2019-01-01 RX ORDER — SERTRALINE HYDROCHLORIDE 100 MG/1
100 TABLET, FILM COATED ORAL NIGHTLY
Status: DISCONTINUED | OUTPATIENT
Start: 2019-01-01 | End: 2019-01-01

## 2019-01-01 RX ORDER — MORPHINE SULFATE 4 MG/ML
4 INJECTION, SOLUTION INTRAMUSCULAR; INTRAVENOUS EVERY 2 HOUR PRN
Status: DISCONTINUED | OUTPATIENT
Start: 2019-01-01 | End: 2020-01-01

## 2019-01-01 RX ORDER — LORAZEPAM 2 MG/ML
INJECTION INTRAMUSCULAR ONCE AS NEEDED
Status: DISCONTINUED | OUTPATIENT
Start: 2019-01-01 | End: 2019-01-01

## 2019-01-01 RX ORDER — ALBUTEROL SULFATE 90 UG/1
2 AEROSOL, METERED RESPIRATORY (INHALATION) EVERY 4 HOURS PRN
Qty: 1 INHALER | Refills: 0 | Status: SHIPPED | OUTPATIENT
Start: 2019-01-01 | End: 2019-01-01

## 2019-01-01 RX ORDER — LITHIUM CARBONATE 300 MG/1
600 TABLET, FILM COATED, EXTENDED RELEASE ORAL ONCE
Status: COMPLETED | OUTPATIENT
Start: 2019-01-01 | End: 2019-01-01

## 2019-01-01 RX ADMIN — ROCURONIUM BROMIDE 50 MG: 10 INJECTION, SOLUTION INTRAVENOUS at 19:40:00

## 2019-01-01 RX ADMIN — ONDANSETRON 4 MG: 2 INJECTION INTRAMUSCULAR; INTRAVENOUS at 20:27:00

## 2019-01-01 RX ADMIN — ROCURONIUM BROMIDE 30 MG: 10 INJECTION, SOLUTION INTRAVENOUS at 20:19:00

## 2019-01-01 RX ADMIN — LIDOCAINE HYDROCHLORIDE 50 MG: 10 INJECTION, SOLUTION EPIDURAL; INFILTRATION; INTRACAUDAL; PERINEURAL at 19:29:00

## 2019-01-01 RX ADMIN — DEXAMETHASONE SODIUM PHOSPHATE 4 MG: 4 MG/ML VIAL (ML) INJECTION at 19:28:00

## 2019-01-01 RX ADMIN — SODIUM CHLORIDE: 9 INJECTION, SOLUTION INTRAVENOUS at 21:32:00

## 2019-01-27 ENCOUNTER — APPOINTMENT (OUTPATIENT)
Dept: CT IMAGING | Facility: HOSPITAL | Age: 64
End: 2019-01-27
Attending: EMERGENCY MEDICINE
Payer: MEDICARE

## 2019-01-27 ENCOUNTER — HOSPITAL ENCOUNTER (EMERGENCY)
Facility: HOSPITAL | Age: 64
Discharge: LEFT BEFORE DISCHARGE | End: 2019-01-27
Attending: EMERGENCY MEDICINE
Payer: MEDICARE

## 2019-01-27 ENCOUNTER — APPOINTMENT (OUTPATIENT)
Dept: GENERAL RADIOLOGY | Facility: HOSPITAL | Age: 64
End: 2019-01-27
Attending: EMERGENCY MEDICINE
Payer: MEDICARE

## 2019-01-27 ENCOUNTER — APPOINTMENT (OUTPATIENT)
Dept: ULTRASOUND IMAGING | Facility: HOSPITAL | Age: 64
End: 2019-01-27
Attending: EMERGENCY MEDICINE
Payer: MEDICARE

## 2019-01-27 VITALS
SYSTOLIC BLOOD PRESSURE: 173 MMHG | DIASTOLIC BLOOD PRESSURE: 89 MMHG | HEART RATE: 65 BPM | TEMPERATURE: 98 F | WEIGHT: 260.13 LBS | BODY MASS INDEX: 37 KG/M2 | OXYGEN SATURATION: 97 % | RESPIRATION RATE: 14 BRPM

## 2019-01-27 DIAGNOSIS — M79.89 LEG SWELLING: ICD-10-CM

## 2019-01-27 DIAGNOSIS — S93.401A MILD SPRAIN OF RIGHT ANKLE, INITIAL ENCOUNTER: Primary | ICD-10-CM

## 2019-01-27 DIAGNOSIS — S09.8XXA BLUNT HEAD TRAUMA, INITIAL ENCOUNTER: ICD-10-CM

## 2019-01-27 LAB
ALBUMIN SERPL-MCNC: 3.4 G/DL (ref 3.1–4.5)
ALBUMIN/GLOB SERPL: 1.1 {RATIO} (ref 1–2)
ALP LIVER SERPL-CCNC: 148 U/L (ref 50–130)
ALT SERPL-CCNC: 14 U/L (ref 14–54)
ANION GAP SERPL CALC-SCNC: 4 MMOL/L (ref 0–18)
APTT PPP: 40.3 SECONDS (ref 26.1–34.6)
AST SERPL-CCNC: 16 U/L (ref 15–41)
ATRIAL RATE: 69 BPM
BASOPHILS # BLD AUTO: 0.03 X10(3) UL (ref 0–0.1)
BASOPHILS NFR BLD AUTO: 0.4 %
BILIRUB SERPL-MCNC: 0.3 MG/DL (ref 0.1–2)
BUN BLD-MCNC: 9 MG/DL (ref 8–20)
BUN/CREAT SERPL: 7.4 (ref 10–20)
CALCIUM BLD-MCNC: 9.2 MG/DL (ref 8.3–10.3)
CHLORIDE SERPL-SCNC: 111 MMOL/L (ref 101–111)
CO2 SERPL-SCNC: 28 MMOL/L (ref 22–32)
CREAT BLD-MCNC: 1.21 MG/DL (ref 0.55–1.02)
EOSINOPHIL # BLD AUTO: 0.2 X10(3) UL (ref 0–0.3)
EOSINOPHIL NFR BLD AUTO: 2.5 %
ERYTHROCYTE [DISTWIDTH] IN BLOOD BY AUTOMATED COUNT: 13.8 % (ref 11.5–16)
GLOBULIN PLAS-MCNC: 3.2 G/DL (ref 2.8–4.4)
GLUCOSE BLD-MCNC: 113 MG/DL (ref 70–99)
HCT VFR BLD AUTO: 45.1 % (ref 34–50)
HGB BLD-MCNC: 14.4 G/DL (ref 12–16)
IMMATURE GRANULOCYTE COUNT: 0.03 X10(3) UL (ref 0–1)
IMMATURE GRANULOCYTE RATIO %: 0.4 %
INR BLD: 2.26 (ref 0.9–1.1)
LYMPHOCYTES # BLD AUTO: 1.27 X10(3) UL (ref 0.9–4)
LYMPHOCYTES NFR BLD AUTO: 16.2 %
M PROTEIN MFR SERPL ELPH: 6.6 G/DL (ref 6.4–8.2)
MCH RBC QN AUTO: 29.3 PG (ref 27–33.2)
MCHC RBC AUTO-ENTMCNC: 31.9 G/DL (ref 31–37)
MCV RBC AUTO: 91.9 FL (ref 81–100)
MONOCYTES # BLD AUTO: 0.38 X10(3) UL (ref 0.1–1)
MONOCYTES NFR BLD AUTO: 4.8 %
NEUTROPHIL ABS PRELIM: 5.94 X10 (3) UL (ref 1.3–6.7)
NEUTROPHILS # BLD AUTO: 5.94 X10(3) UL (ref 1.3–6.7)
NEUTROPHILS NFR BLD AUTO: 75.7 %
OSMOLALITY SERPL CALC.SUM OF ELEC: 295 MOSM/KG (ref 275–295)
P AXIS: 51 DEGREES
P-R INTERVAL: 224 MS
PLATELET # BLD AUTO: 197 10(3)UL (ref 150–450)
POTASSIUM SERPL-SCNC: 4.1 MMOL/L (ref 3.6–5.1)
PSA SERPL DL<=0.01 NG/ML-MCNC: 25.7 SECONDS (ref 12.4–14.7)
Q-T INTERVAL: 444 MS
QRS DURATION: 86 MS
QTC CALCULATION (BEZET): 475 MS
R AXIS: -40 DEGREES
RBC # BLD AUTO: 4.91 X10(6)UL (ref 3.8–5.1)
RED CELL DISTRIBUTION WIDTH-SD: 46.7 FL (ref 35.1–46.3)
SODIUM SERPL-SCNC: 143 MMOL/L (ref 136–144)
T AXIS: 49 DEGREES
TROPONIN I SERPL-MCNC: <0.046 NG/ML (ref ?–0.05)
VENTRICULAR RATE: 69 BPM
WBC # BLD AUTO: 7.9 X10(3) UL (ref 4–13)

## 2019-01-27 PROCEDURE — 84484 ASSAY OF TROPONIN QUANT: CPT | Performed by: EMERGENCY MEDICINE

## 2019-01-27 PROCEDURE — 70450 CT HEAD/BRAIN W/O DYE: CPT | Performed by: EMERGENCY MEDICINE

## 2019-01-27 PROCEDURE — 85025 COMPLETE CBC W/AUTO DIFF WBC: CPT | Performed by: EMERGENCY MEDICINE

## 2019-01-27 PROCEDURE — 99285 EMERGENCY DEPT VISIT HI MDM: CPT

## 2019-01-27 PROCEDURE — 85730 THROMBOPLASTIN TIME PARTIAL: CPT | Performed by: EMERGENCY MEDICINE

## 2019-01-27 PROCEDURE — 93970 EXTREMITY STUDY: CPT | Performed by: EMERGENCY MEDICINE

## 2019-01-27 PROCEDURE — 71046 X-RAY EXAM CHEST 2 VIEWS: CPT | Performed by: EMERGENCY MEDICINE

## 2019-01-27 PROCEDURE — 80053 COMPREHEN METABOLIC PANEL: CPT | Performed by: EMERGENCY MEDICINE

## 2019-01-27 PROCEDURE — 85610 PROTHROMBIN TIME: CPT | Performed by: EMERGENCY MEDICINE

## 2019-01-27 PROCEDURE — 73610 X-RAY EXAM OF ANKLE: CPT | Performed by: EMERGENCY MEDICINE

## 2019-01-27 PROCEDURE — 93005 ELECTROCARDIOGRAM TRACING: CPT

## 2019-01-27 PROCEDURE — 93010 ELECTROCARDIOGRAM REPORT: CPT

## 2019-01-27 PROCEDURE — 73560 X-RAY EXAM OF KNEE 1 OR 2: CPT | Performed by: EMERGENCY MEDICINE

## 2019-01-27 PROCEDURE — 36415 COLL VENOUS BLD VENIPUNCTURE: CPT

## 2019-01-27 NOTE — ED INITIAL ASSESSMENT (HPI)
Pt states yesterday, tripped over a dog bed at her home. Pt c/o pain to right ankle. Denies syncope/LOC.

## 2019-01-27 NOTE — ED NOTES
Patient is resting comfortably. Ambulated to restroom with walker. Denies h/a c/o cough with slight sob.

## 2019-01-27 NOTE — ED NOTES
Pt explained to dr Hussein Lose she has new onset BLE edema over last several days. +clotting disorder taking coumadin hit head while tripping over dog bed.  No LOC

## 2019-02-15 PROBLEM — I25.10 CORONARY ARTERY DISEASE INVOLVING NATIVE CORONARY ARTERY WITHOUT ANGINA PECTORIS: Status: ACTIVE | Noted: 2019-02-15

## 2019-02-15 PROBLEM — E11.610 CHARCOT FOOT DUE TO DIABETES MELLITUS (HCC): Status: ACTIVE | Noted: 2019-02-15

## 2019-02-15 PROCEDURE — 80178 ASSAY OF LITHIUM: CPT | Performed by: NURSE PRACTITIONER

## 2019-10-30 PROBLEM — R41.0 CONFUSION: Status: ACTIVE | Noted: 2019-01-01

## 2019-10-31 NOTE — ED PROVIDER NOTES
Patient Seen in: [facility] Emergency Department      History   Patient presents with:  Fatigue (constitutional, neurologic)    Stated Complaint: weakness, increase falling, searching for words for a week, more forgetful    HPI    38-year-old female 05/15/2019    DMG SPLIT AHI 24 SaO2 josiah 80% CPAP 10 [employer]   • Osteoarthrosis, unspecified whether generalized or localized, unspecified site    • Other and unspecified hyperlipidemia    • Unspecified essential hypertension    • Visual impairment     gl exam   Ears:  TM pearly gray color, external ear canals normal, both ears, no mastoid tenderness bilaterally   Nose:  Nares symmetrical, minimal watery discharge; no sinus tenderness  Throat:  Lips, tongue, and mucosa are moist, pink, and intact; posterior URINALYSIS WITH CULTURE REFLEX - Normal   LIPASE - Normal   TROPONIN I - Normal   PTT, ACTIVATED - Normal   CBC WITH DIFFERENTIAL WITH PLATELET    Narrative: The following orders were created for panel order CBC WITH DIFFERENTIAL WITH PLATELET.   Proc sinusitis. MASTOIDS:          No sign of acute inflammation. SKULL:             No evidence for fracture or osseous abnormality. OTHER:             None.       CONCLUSION:  Negative   Dictated by: Alfredo Campos MD on 10/30/2019 at 20:16     Approved by: steroids patient refused due to bipolar I discussed with the patient that she should be admitted for further evaluation of confusion, aphasia and the OPD exacerbation with hypoxia.   The patient refuses she states she will not be admitted and will sign out (four) hours as needed for Wheezing., Normal, Disp-1 Inhaler, R-0    !! - Potential duplicate medications found. Please discuss with provider.             Left AMA

## 2019-10-31 NOTE — ED PROVIDER NOTES
I reviewed that chart and discussed the case. I have examined the patient and noted poor air movement noted bilaterally with end expiration wheezes.   Cardiovascular exam shows regular rate and rhythm    I agree with the following clinical impression(s):

## 2019-10-31 NOTE — ED NOTES
ama form signed by pt and this rn and placed on chart, pt educated on need to stay for additional testing/treatment as well as inpatient admission. Pt continues to states she wishes to leave ama.  Pt made aware her condition could worsen if she leaves causi

## 2019-11-06 NOTE — ED PROVIDER NOTES
Patient Seen in: [facility] Emergency Department      History   Patient presents with:  Fall (musculoskeletal, neurologic)    Stated Complaint: Fall    HPI    Patient is a 12-year-old female with a history of protein S deficiency, DVTs on Coumadin a 6/12/2015    Performed by Zerita Mohs, DDS at [facility] MAIN OR   • OTHER Bilateral 1969    Knee caps removed   • OTHER  2004    All upper teeth removal   • OTHER      Placement of IVC filter   • OTHER SURGICAL HISTORY      marshall knee cap removal   • REPAIR OF R or deformity. Distal pulses are good. Skin:     General: Skin is warm and dry. Neurological:      Mental Status: She is alert and oriented to person, place, and time.              ED Course   Labs Reviewed - No data to display       Xr Ankle (min 3 Vie on Coumadin presenting with complaint of low back pain and right ankle pain after mechanical fall.   She does not think she hit her head but her neck does hurt so we will get CTs of her brain and cervical spine because of the fall, along with x-rays of the

## 2019-11-06 NOTE — ED INITIAL ASSESSMENT (HPI)
Patient was getting into car and fell with feet on floor board of car backwards onto pavement. Patient with chronic back pain, states this pain is worse on arrival. Patient also c/o pain of neck pain and right ankle pain. Patient on Coumadin.  Unsure if she

## 2019-11-07 NOTE — CM/SW NOTE
Patient given resources for [facility], private duty, use of [facility] services, and DME sources, such as [employer].

## 2019-12-14 PROBLEM — R73.9 HYPERGLYCEMIA: Status: ACTIVE | Noted: 2019-01-01

## 2019-12-14 PROBLEM — R41.82 ALTERED MENTAL STATUS: Status: ACTIVE | Noted: 2019-01-01

## 2019-12-14 PROBLEM — R41.82 ALTERED MENTAL STATUS, UNSPECIFIED ALTERED MENTAL STATUS TYPE: Status: ACTIVE | Noted: 2019-01-01

## 2019-12-14 NOTE — ED PROVIDER NOTES
Patient Seen in: [facility] Emergency Department      History   Patient presents with:  Weakness    Stated Complaint: weakness    HPI    This is a 64-year-old female who arrives here with generalized weakness and urinating more.   The patient is on P OTHER Bilateral 1969    Knee caps removed   • OTHER  2004    All upper teeth removal   • OTHER      Placement of IVC filter   • OTHER SURGICAL HISTORY      marshall knee cap removal   • REPAIR OF RECTOCELE     • TONSILLECTOMY     • TUBAL LIGATION it is.  Knows who the president is. Cranial nerves are grossly intact she has no pronator drift. .  Muscle strength and sensory exam is grossly normal.  And the patient is neurologically intact with no focal findings.          ED Course     Labs Reviewed Abnormal            Final result                 Please view results for these tests on the individual orders.    RAINBOW DRAW BLUE   RAINBOW DRAW LAVENDER   RAINBOW DRAW LIGHT GREEN   RAINBOW DRAW GOLD          The patient was placed on monitors, IV w all of her extremities to command. She knows what the president is but cannot tell me what year is what month it is.       Discussed this case with Dr. Breanne Bloom from neurology, also discussed this case [facility]  The patient

## 2019-12-14 NOTE — ED INITIAL ASSESSMENT (HPI)
Pt from home generalized weakness and urinating more, pt on percocet and fentanyl patch, pt slow to respond and slow to respond to questions

## 2019-12-14 NOTE — CONSULTS
Chart reviewed. Discussed with ED at length. Patient with reports of worsening confusion, weakness. Seen by neurology 9/2019. Found to have polyneuropathy. Chronically hypoxemic. Multiple CT brain - benign. Lithium level elevated.   On multiple medi

## 2019-12-15 NOTE — PLAN OF CARE
Problem: Impaired Swallowing  Goal: Minimize aspiration risk  Description  Interventions:  - Patient should be alert and upright for all feedings (90 degrees preferred)  - Offer food and liquids at a slow rate  - No straws  - Encourage small bites of amanuel

## 2019-12-15 NOTE — PHYSICAL THERAPY NOTE
PHYSICAL THERAPY EVALUATION - INPATIENT     Room Number: 9338/1049-F  Evaluation Date: 12/15/2019  Type of Evaluation: Initial  Physician Order: PT Eval and Treat    Presenting Problem: altered mental status and weakness  Reason for Therapy: Mobility ([facility]    • Blood clotting disorder (HCC)    • Blood disorder     Protein S deficiency   • COPD (chronic obstructive pulmonary disease) (HCC)     CPAP at night   • Deep vein thrombosis (HCC)    • Depression    • Esophageal reflux    • Hearing impairment Cognitive Status:  Impaired  · Arousal/Alertness:  inconsistent responses to stimuli  · Orientation Level:  oriented to person, disoriented to place, disoriented to time and disoriented to situation  · Following Commands:  follows one-step commands inconsi Skilled Therapy Provided: pt seen at bedside with PCT present. Pt's  present at end of session. Pt was trying to get out of bed and sitting at edge of bed with PCT when PT arrived.   Mobility assessment limited to sit to stand and side steps education; Family education;Gait training;Stair training;Transfer training  Rehab Potential : Fair  Frequency (Obs): Daily  Number of Visits to Meet Established Goals: 5      CURRENT GOALS    Goal #1 Patient is able to demonstrate supine - sit EOB @ level:

## 2019-12-15 NOTE — SLP NOTE
ADULT SWALLOWING EVALUATION    ASSESSMENT    ASSESSMENT/OVERALL IMPRESSION:  Pt seen this AM for bedside swallow evaluation. RN approved session. Pt admitted to hospital due to c/o weakness and AMS.  Suspected multifactorial issue including polypharmacy, hy position; Slow rate;Small bites and sips; No straw;Cueing to swallow  Medication Administration Recommendations: One pill at a time; Whole in puree  Treatment Plan/Recommendations: Dysphagia therapy  Discharge Recommendations/Plan: Undetermined    HISTORY   M Within Functional Limits  Rate of Motion: Within Functional Limits    Voice Quality: Clear  Respiratory Status: Unlabored  Consistencies Trialed: Thin liquids;Puree;Hard solid  Method of Presentation: Self presentation;Spoon;Straw;Cup;Single sips; Consecuti

## 2019-12-15 NOTE — CONSULTS
[facility]  Neurology  Hospital Consultation Report    Adolph Easley Patient Status:  Observation    1955 MRN [id number]   [facility] 7NE-A Attending Gilma Owusu MD   Hosp Day # 0 PCP Manda Chisholm MD   Date of cholesterol    • Hx of diseases NEC     BIPOLAR DISORDER   • Hx of diseases NEC     CHRONIC PAIN (S/P DVTS)   • Hx of diseases NEC     PROTEIN S DEFICIENCY   • Hx of diseases NEC     CHRONIC CONSTIPATION   • Incontinence    • Muscle weakness    • Neuropath mL, 3 mL, Nebulization, Q6H PRN  Montelukast Sodium (SINGULAIR) tab 10 mg, 10 mg, Oral, Nightly  sodium Chloride 0.9 % nebulizer solution 3 mL, 3 mL, Nebulization, PRN  Umeclidinium Bromide (INCRUSE ELLIPTA) 62.5 MCG/INH inhaler 1 puff, 1 puff, Inhalation, Rhinitis. oxyCODONE-acetaminophen (PERCOCET)  MG Oral Tab, Take 1 tablet by mouth every 6 (six) hours as needed for Pain.     IPRATROPIUM BROMIDE 0.06 % Nasal Solution, SPRAY TWICE BY NASAL ROUTE FOUR TIMES DAILY  losartan Potassium 50 MG Oral Tab, T well composed. Head: Normocephalic, atraumatic. Eyes: anicteric  ENT: normal tongue, normal mucosa. Neck: supple  Lymph Nodes:  No adenopathy  Cardiovascular: Regular rate and rhythm.   Skin: No evidence for rash    NEUROLOGICAL EXAMINATION  Mental stat intracranial process. Dictated by: Radha Tapia MD on 12/14/2019 at 16:35     Approved by: Radha Tapia MD on 12/14/2019 at 16:37          Xr Chest Ap Portable  (cpt=71045)    Result Date: 12/14/2019  CONCLUSION:  No focal consolidation.     Dictated

## 2019-12-15 NOTE — PLAN OF CARE
Assumed care at [address] [facility]. Patient A&Ox2. Neuros q 4, no acute changes, see charting for deficits. Tele SR, on room air, 3L NC HS, refusing CPAP. Denies pain. C/o nausea, PRN zofran with some relief. Family concerned about patient missing dose of lithium.

## 2019-12-15 NOTE — PLAN OF CARE
Assumed care at 0730. A&OX1-2, waxes and wanes. Hand tremors noted. Generalized weakness noted. RA with adequate sats. Tele SR. Speech here to evaluate pt. Pt drowsy and pocketing food. Placed on pureed, thin liquid diet with no straws.   BP elevated

## 2019-12-15 NOTE — PROGRESS NOTES
Addendum to psychiatric consult    Will reduce sertraline as well to 50 mg qd s it may be conributing ot the clinical picture of confusion and tremulousness,

## 2019-12-15 NOTE — PROGRESS NOTES
Pharmacy Dosing Service  Warfarin (Coumadin) Subsequent Dosing         Lamar Pretty is a 75year old female for whom pharmacy has been dosing warfarin.      Goal INR is 2-3    Recent Labs   Lab 12/14/19  1438 12/15/19  0603 12/15/19  0755   INR 2.27

## 2019-12-15 NOTE — PROGRESS NOTES
[facility] Hospitalist Progress Note     Beckamare Escalona Patient Status:  Observation    1955 MRN [id number]   [facility] 7NE-A Attending Yoaksta Donnelly MD   Hosp Day # 0 PCP Nano Lozano MD     CC: follow up    SUBJECTIVE:  Parker Search 12/14/2019 at 16:35     Approved by: Steven Oliveros MD on 12/14/2019 at 16:37          Xr Chest Ap Portable  (cpt=71045)    Result Date: 12/14/2019  CONCLUSION:  No focal consolidation.     Dictated by: Ana Olson MD on 12/14/2019 at 16:49     Approved by: insipidus in setting of lithium use?  - will changes fluids to D5  - consult nephrology  - hold lithium, losartan for now     # Elevated BP  - amlodipine started this AM  - holding ARB d/t LESTER  - PRN hydralazine  - apprec renal assistance     # COPD  - not

## 2019-12-15 NOTE — PROGRESS NOTES
[address] [facility] Dosing Service  Warfarin (Coumadin) Initial Dosing    Tiffany Chris is a 75year old female for whom pharmacy has been consulted to dose warfarin (COUMADIN) for hypercoagulable state/protein S deficiency by Dr. Tarun Gallo.   Based on this ind

## 2019-12-15 NOTE — H&P
[facility] Hospitalist H&P       CC: Patient presents with:  Weakness       PCP: Kathy Smart MD    History of Present Illness:  63y/o F with MMP including COPD, SMITA, obesity, tobacco use, HTN, neuropathy, bipolar on lithium, depression, lumbar spinal stenosis impairment     glasses        PSH  Past Surgical History:   Procedure Laterality Date   • BENIGN BIOPSY LEFT  1998    cyst removed   • CHOLECYSTECTOMY     • MOUTH ODONTECTOMY N/A 6/12/2015    Performed by Alicia Valle DDS at [facility] MAIN OR   • OTHER Bilater 2  Sertraline HCl 100 MG Oral Tab, Take 2 tablets (200 mg total) by mouth daily. , Disp: 180 tablet, Rfl: 3  Lithium Carbonate 300 MG Oral Tab, Take 3 tablets (900 mg total) by mouth nightly., Disp: 270 tablet, Rfl: 3  diazepam 5 MG Oral Tab, Take 1 tablet Encounters:  12/14/19 : 255 lb (115.7 kg)  10/30/19 : 250 lb (113.4 kg)  08/07/19 : 250 lb (113.4 kg)  08/02/19 : 250 lb (113.4 kg)  07/30/19 : 250 lb (113.4 kg)  06/13/19 : 250 lb (113.4 kg)      Gen: alert, NAD, interactive, slowed mentation, oriented to no midline shift. There is no mass effect. No sign of acute territorial infarction. SINUSES:           No sign of acute sinusitis. MASTOIDS:          Opacification of the left mastoid air cells unchanged. . SKULL:             No depressed calvarial fract -- can use tramadol for pain for now  - neuro checks    # Bipolar  - with elevated lithium level  - hold lithium  - psych c/s    # Mild LESTER vs CKD  - Cr baseline seems to range 1.0-1.2  - hold lithium, lisinopril for now   - renal US  - urine Na, Cr  - gen

## 2019-12-16 NOTE — OCCUPATIONAL THERAPY NOTE
OT order received. Pt had rapid response this morning d/t more L weakness. MRI ordered. Will hold off until following testing as appropriate and as time allows.

## 2019-12-16 NOTE — PROGRESS NOTES
[facility]  Report of Psychiatric Progress Note    Donny Cain Patient Status:  Observation    1955 MRN [id number]   [facility] 7NE-A Attending Kely Mccracken MD   Hosp Day # 0 PCP Juan العلي MD     Date of [place]miss Per [place]:  Fentanyl 25mcg patch last filled on 12/6/19  Percocet 10/325 1 tab every 6 hrs prn filled in 12/2019  Valium 5mg nightly last filled 8/12/19  90 tabs    Dr. Fifi Samuel pain physician prescribed opioids.  Dr. Liz Harding HISTORY      marshall knee cap removal   • REPAIR OF RECTOCELE     • TONSILLECTOMY     • TUBAL LIGATION       Family History   Adopted: Yes   Family history unknown: Yes      reports that she has been smoking cigarettes.  She has a 40.00 pack-year smoking histor 1 spray, Each Nare, Nightly PRN  •  Ipratropium Bromide (ATROVENT) 0.06 % nasal spray 1 spray, 1 spray, Each Nare, QID PRN  •  ipratropium-albuterol (DUONEB) nebulizer solution 3 mL, 3 mL, Nebulization, Q6H PRN  •  sodium Chloride 0.9 % nebulizer solution

## 2019-12-16 NOTE — PROGRESS NOTES
NEUROLOGY PROGRESS NOTE     SUBJECTIVE:     Interval History: Nursing staff noted increased left-sided weakness and aphasia overnight around 4am.  NIH stroke scale of 8 with no baseline evaluation.  Neurology called and recommended stroke code to be activat dextrose 83 mL/hr at 12/16/19 0500     PRN Meds:  Labetalol HCl, melatonin, ondansetron HCl, hydrALAzine HCl, Albuterol Sulfate HFA, Fluticasone Propionate, Ipratropium Bromide, ipratropium-albuterol, sodium Chloride, traMADol HCl    ASSESSMENT/PLAN   Nica

## 2019-12-16 NOTE — RESPIRATORY THERAPY NOTE
SMITA : EQUIPMENT USE: DAILY SUMMARY                                            SET MODE:  CPAP WITH CFLEX                                          USAGE IN HOURS:5:12                                          90% EXP

## 2019-12-16 NOTE — PROGRESS NOTES
[address] [facility] Dosing Service  Warfarin (Coumadin) Subsequent Dosing    [place] is a 75year old female for whom pharmacy has been dosing warfarin (Coumadin) for hypercoagulable state/protein S deficiency by Dr. So Soliman.  Based on this indication,

## 2019-12-16 NOTE — PROCEDURES
Clinical History: 75year old female with AMS    EEG DESCRIPTION    AWAKE  Background: 5-6 Hz; 30-70 uV; posterior head regions, symmetric, waxing and waning, reactive to eye opening and closure  Beta: 15-25 Hz; 20 uV; frontocentral, symmetric, waxing and

## 2019-12-16 NOTE — SIGNIFICANT EVENT
Responded to rapid response paged at 0411 to room 7618    Pt is a 75 y.o. female admitted on 12/14 for AMS for over past month with an increase this past week per chart review.  Past medical history includes DVT, depression, HTN, HLD, obesity, sleep apnea,

## 2019-12-16 NOTE — SLP NOTE
SPEECH DAILY NOTE - INPATIENT    ASSESSMENT & PLAN   ASSESSMENT  Pt seen for follow-up visit to monitor tolerance of current diet. Per RN pt with some increased confusion this date requiring extra prompting to swallow meds crushed in puree.  At bedside pt i Addressed     FOLLOW UP  Follow Up Needed: Yes  SLP Follow-up Date: 12/17/19      Session: 2    If you have any questions, please contact Amber Willams, [address]  Speech Pager [phone]

## 2019-12-16 NOTE — PROGRESS NOTES
[facility]    Nephrology Progress Note    Sergey Vela Attending:  Domingo Yepez MD       SUBJECTIVE:     Slow to answer questions  No sig complaints  BPs are elevated    PHYSICAL EXAM:     Vital Signs: BP (!) 180/98 (BP Location: Left a Results   Component Value Date    WBC 9.7 12/15/2019    RBC 5.78 (H) 12/15/2019    HGB 16.7 (H) 12/15/2019    HCT 52.9 (H) 12/15/2019    .0 12/15/2019    MPV 10.1 (H) 12/16/2011    MCV 91.5 12/15/2019    MCH 28.9 12/15/2019    MCHC 31.6 12/15/2019 nasal spray 1 spray, 1 spray, Each Nare, Nightly PRN  Ipratropium Bromide (ATROVENT) 0.06 % nasal spray 1 spray, 1 spray, Each Nare, QID PRN  ipratropium-albuterol (DUONEB) nebulizer solution 3 mL, 3 mL, Nebulization, Q6H PRN  sodium Chloride 0.9 % nebuliz

## 2019-12-16 NOTE — PHYSICAL THERAPY NOTE
PT treatment attempted, patient with RRT called due to increase confusion and weakness on L side, MRI brain pending, hold at this time.

## 2019-12-16 NOTE — CONSULTS
NEPHROLOGY CONSULT NOTE     DATE: 12/15/2019    Requesting Physician: Dr. America Felix     Reason for Consult:  LESTER vs CKD, lithium toxicity, hypernatremia      HISTORY OF PRESENT ILLNESS: Jesus Arambula is a 75year old female with history of COPD, HTN, bip removed   • CHOLECYSTECTOMY     • MOUTH ODONTECTOMY N/A 6/12/2015    Performed by Bob Mays DDS at [address]   • OTHER Bilateral 1969    Knee caps removed   • OTHER  2004    All upper teeth removal   • OTHER      Placement of IVC filter   • OTHER ANDRE Forced sexual activity: Not on file    Other Topics      Concerns:        Not on file    Social History Narrative      ** Merged History Encounter **             MEDICATIONS:   amLODIPine Besylate (NORVASC) tab 5 mg, 5 mg, Oral, Daily  Labetalol HCl (TRAND GM/15ML Oral Solution, TAKE 30 ML BY MOUTH DAILY AS NEEDED  Sertraline HCl 100 MG Oral Tab, Take 2 tablets (200 mg total) by mouth daily. Lithium Carbonate 300 MG Oral Tab, Take 3 tablets (900 mg total) by mouth nightly.   diazepam 5 MG Oral Tab, Take 1 ta m)   Wt 255 lb (115.7 kg)   SpO2 (!) 88%   BMI 35.58 kg/m²   GEN:  NAD  EYES: anicteric, no injection  ENT: dry oral mucosa   CHEST: CTA b/l  CARDIAC: S1S2 normal  ABD: soft, NT/ND  : ivy in place   EXT:+  lower ext edema  NEURO: awake, restless, tremo increased confusion / altered mental status over the past week. Nephrology is consulted for LESTER vs CKD, lithium toxicity and hypernatremia. Acute Kidney Injury   - thought to be pre-renal in the setting of polyuria.    - Urine Na 24, renal US unremarkab

## 2019-12-16 NOTE — PROGRESS NOTES
Late entry. Nursing called this morning approximately 03: 40. Nursing explained that the patient seems \"more confused\" as compared to previous examinations. Also reported there was left-sided weakness.   I asked the nurse if the weakness was significan

## 2019-12-16 NOTE — PROGRESS NOTES
[facility] Hospitalist Progress Note     Huyen Briana Patient Status:  Observation    1955 MRN [id number]   [facility] 7NE-A Attending Tonya Oliva MD   Hosp Day # 0 PCP Aysha Lynch MD     CC: follow up    SUBJECTIVE:  Over Imaging:  Us Kidney/bladder (cpt=76770)    Result Date: 12/15/2019  CONCLUSION:  No renal abnormality demonstrated.     Dictated by: Kenneth Fisher MD on 12/15/2019 at 18:46     Approved by: Kenneth Fisher MD on 12/15/2019 at 18:47            Meds:   Schedu insipidus in setting of lithium use  - renal following  - IVFs  - hold lithium, losartan      # Elevated BP  - increase amlodipine dose  - amiloride added by renal  - holding ARB d/t LESTER  - PRN hydralazine, labetalol  - apprec renal assistance     # COPD

## 2019-12-16 NOTE — PLAN OF CARE
Assumed care 1900. Pt A&O x1 to self.  at bedside. Vasovagal episode with emesis. MD notified. See orders. C/o Nausea, zofran given with relief. Diet changed to clear liquid per MD order. Neuros q 4 maintained.    Pt with increased confusion

## 2019-12-17 NOTE — SLP NOTE
SPEECH DAILY NOTE - INPATIENT    ASSESSMENT & PLAN   ASSESSMENT  Pt seen for dysphagia tx to assess tolerance with recommended diet, ensure proper utilization of aspiration precautions and provide pt/family education. Spouse and daughter present.  Pt placed Goal #2 The patient/family/caregiver will demonstrate understanding and implementation of aspiration precautions and swallow strategies independently over 3 session(s).     Met   Goal #3 The patient will tolerate trial upgrade of soft solid consistency a

## 2019-12-17 NOTE — PROGRESS NOTES
[facility]  Report of Psychiatric Progress Note    Romeliaethan Clark Patient Status:  Observation    1955 MRN [id number]   [facility] 7NE-A Attending Rasheed Vegas MD   Hosp Day # 3 PCP Nicky Park MD     Date of Admiss wants to switch to buprenorphine. Will start Subutex 4mg sl x 1 prn symptoms or signs of withdrawal (yawning, goosebumps, N/V/D, muscle aches, rhinorrhea) and 2mg sl every 2 hr PRN, max of 12mg per 24 hrs.  When her  and daughter come, will have them Protein S deficiency   • COPD (chronic obstructive pulmonary disease) (HCC)     CPAP at night   • Deep vein thrombosis (HCC)    • Depression    • Esophageal reflux    • Hearing impairment     hearing loss   • High blood pressure    • High cholesterol    • michele    Medications:    Current Facility-Administered Medications:   •  amLODIPine Besylate (NORVASC) tab 10 mg, 10 mg, Oral, Daily  •  aMILoride HCl (MIDAMOR) tab 10 mg, 10 mg, Oral, Daily  •  lidocaine-menthol 4-1 % 1 patch, 1 patch, Transdermal, Daily repeat    Insight: limited  Judgment: limited    Laboratory Data:  Lab Results   Component Value Date    CREATSERUM 1.23 12/17/2019    BUN 19 12/17/2019     12/17/2019    K 3.7 12/17/2019     12/17/2019    CO2 22.0 12/17/2019     12/17/2 cerebrovascular disease, toxic or metabolic derangement, or even medication side effects. Clinical correlation is recommended.   There is no evidence of focal slowing or seizure activity on this exam.

## 2019-12-17 NOTE — RESPIRATORY THERAPY NOTE
SMITA : EQUIPMENT USE: DAILY SUMMARY                                            SET MODE:                                          USAGE IN HOURS:                                          90% EXP. PRESSURE(EPAP):

## 2019-12-17 NOTE — PLAN OF CARE
Assumed care at 0730  Patient alert to self, confused, forgetful  Neuro checks done Q4H  Generalized weakness noted  Follows most simple commands, needs some additional queuing   Speech clear, but garbled at times, MDs aware  Pt denies pain or discomfort

## 2019-12-17 NOTE — PHYSICAL THERAPY NOTE
Attempted PT session, INR 5.08 this morning and pt is not appropriate for mobility at this time. Will continue to follow.

## 2019-12-17 NOTE — PLAN OF CARE
Assumed care of patient at 13:00 pm.  A/O x 1, waxes and wanes throughout shift. Drowsy. Noted hand tremors, tongue movement/tremors, no new neuro deficits. Pt c/o of back and headaches. Pain medications adjusted. Pt turned q 2 with hot packs.   Pt tole precautions  - Recommend use of  RW for transfers and ambulation   Outcome: Progressing     Problem: Impaired Cognition  Goal: Patient will exhibit improved attention, thought processing and/or memory  Description  Interventions:    Outcome: Progressing

## 2019-12-17 NOTE — PROGRESS NOTES
[facility] Dosing Service  Warfarin (Coumadin) Subsequent Dosing    Lenin Pratt is a 75year old female for whom pharmacy has been dosing warfarin (Coumadin).  Goal INR is 2-3    Recent Labs   Lab 12/14/19  1438 12/15/19  0603 12/15/19  0755 12/16/

## 2019-12-17 NOTE — PROGRESS NOTES
NEUROLOGY PROGRESS NOTE     SUBJECTIVE:     Interval History: No events reported overnight. No new neurological symptoms. Patient is more interactive and speech is clear this morning. Reports abdominal pain.     OBJECTIVE   Temp:  [97.4 °F (36.3 °C)-99 ° dextrose 125 mL/hr at 12/17/19 0711     PRN Meds:  Labetalol HCl, ondansetron HCl, hydrALAzine HCl, Albuterol Sulfate HFA, Fluticasone Propionate, Ipratropium Bromide, ipratropium-albuterol, sodium Chloride     Imaging:    MRI Brain (12/16/2019):  CONCLUSI stroke  -PT/OT/Speech    -No further workup from neurology standpoint; please call any additional questions or concerns.     Total face to face time was 35 minutes, more than 50% of the time was spent in counseling and/or coordination of care related to abo

## 2019-12-17 NOTE — PROGRESS NOTES
[facility]    Nephrology Progress Note    Adolph Easley Attending:  Gilma Owusu MD       SUBJECTIVE:     C/o thirst  Does not feel she has enough access to water  Denies pain  Restarted on lithium per psychiatry    PHYSICAL EXAM:     Vi 12/14/2019    NEPERCENT 81.6 12/14/2019    LYPERCENT 12.2 12/14/2019    MOPERCENT 4.0 12/14/2019    EOPERCENT 1.7 12/14/2019    BAPERCENT 0.2 12/14/2019    NE 7.53 12/14/2019    LYMABS 1.13 12/14/2019    MOABSO 0.37 12/14/2019    EOABSO 0.16 12/14/2019 Nare, Nightly PRN  Ipratropium Bromide (ATROVENT) 0.06 % nasal spray 1 spray, 1 spray, Each Nare, QID PRN  ipratropium-albuterol (DUONEB) nebulizer solution 3 mL, 3 mL, Nebulization, Q6H PRN  sodium Chloride 0.9 % nebulizer solution 3 mL, 3 mL, Nebulizatio

## 2019-12-17 NOTE — PROCEDURES
[facility], [address] [place] [address]      PATIENT'S NAME: Aaron Idris   ATTENDING PHYSICIAN: Angel Beckett M.D.    PATIENT ACCOUNT #: [de-identified] LOCATION: [address] [place] [address]   MEDICAL RECORD #: [id number] CHRIS Hale Lolis Cabral M.D.  d: 12/16/2019 14:04:46  t: 12/16/2019 14:48:23  [facility] [id number]  /

## 2019-12-17 NOTE — PROGRESS NOTES
[facility] Hospitalist Progress Note     Joshua Cid Patient Status:  Inpatient    1955 MRN [id number]   [facility] 7NE-A Attending Abdiel Hart MD   Hosp Day # 0 PCP Caitie Cisneros MD     CC: follow up    SUBJECTIVE:  Mentat 12/16/19  0416   PGLU 139* 137*       Imaging:  Mri Brain (cpt=[id number])    Result Date: 12/16/2019  CONCLUSION:  1. No acute intracranial process noted.  2. Mild diffuse atrophy and white matter disease consistent with mild chronic small vessel ischemic mccarthy place in ED, now removed  - EtOH negative  - neuro consulted, apprec recs  - EEG negative for seizure, + generalized slowing  - MRI negative for acute CVA  - psych consulted, apprec recs  - see mgmt of lithium toxicity below  - hold sedating meds  - neuro

## 2019-12-17 NOTE — PROGRESS NOTES
12/17/19 0945   Clinical Encounter Type   Visited With Patient   Referral To Nurse  ( provided HPOA form to patient/not able to complete at this time. )   Patient Spiritual Encounters   Spiritual Interventions  provided prayer and care w

## 2019-12-17 NOTE — PLAN OF CARE
Assumed care at 1. Patient A&Ox1. Confused. Drowsy but easily arousable. Following simple commands. MRI completed, haldol given prior. No acute findings. Tele SR, CPAP HS, . Took meds crushed with apple sauce. No s/s of distress.   Will cont to

## 2019-12-18 NOTE — CM/SW NOTE
PAS screen is required for entrance into SNF since pt has a diagnosis of Bipolar 1 Disorder - clinical faxed and call for PAS screen was requested.

## 2019-12-18 NOTE — PLAN OF CARE
Assumed care of patient at 07:30 am.  A/O x 2, improved since yesterday, waxes and wanes throughout shift. Speech clear to garbled at times. Follows some simple commands, requires additional queuing at times. Hand tremors, tongue movement/tremors.  No mobility and gait  - Educate and engage patient/family in tolerated activity level and precautions  - Recommend use of  RW for transfers and ambulation   Outcome: Progressing     Problem: Impaired Cognition  Goal: Patient will exhibit improved attention, t

## 2019-12-18 NOTE — SLP NOTE
SPEECH DAILY NOTE - INPATIENT    ASSESSMENT & PLAN   ASSESSMENT  Pt seen at bedside this AM for speech therapy services. Pt's  present at bedside.   clarified pt only wears upper dentures at home- pt with progressive issues with mastication at Progress                FOLLOW UP  Follow Up Needed: Yes  SLP Follow-up Date: 12/19/19  Number of Visits to Meet Established Goals: 2    Session: 4/6    If you have any questions, please contact Claudia Pritchard M.S. [address] [facility]  Pager [phone]

## 2019-12-18 NOTE — PROGRESS NOTES
[facility]  Report of Psychiatric Progress Note    Kaila Ambriz Patient Status:  Observation    1955 MRN [id number]   [facility] [place] 7NE-A Attending Dulce Matos MD   Hosp Day # 4 PCP Anamika Champion MD     Date of Admiss like to go home. No agitation per staff. Worked with PT this AM.     12/17/19-  She feels \"confused\" but is able to communicate in full sentences. She tells me she wants to go home. She feels anxious and tired. She has no c/o pain.  When I bring up the op disease) ([facility] [address]     CPAP at night   • Deep vein thrombosis (HCC)    • Depression    • Esophageal reflux    • Hearing impairment     hearing loss   • High blood pressure    • High cholesterol    • Hx of diseases NEC     BIPOLAR DISORDER   • Hx of diseases NE Medications:   •  Potassium Chloride ER (K-DUR M20) CR tab 40 mEq, 40 mEq, Oral, Daily  •  hydrochlorothiazide (HYDRODIURIL) tab 25 mg, 25 mg, Oral, Daily  •  Lithium Carbonate cap 600 mg, 600 mg, Oral, Nightly  •  Sertraline HCl (ZOLOFT) tab 200 mg, 200 m times  Thought content: doesn't appear to be actively hallucinating     Orientation: oriented person, hospital  Attention and Concentration: poor today  Memory: intact remote  Language: able to name objects    Insight: limited  Judgment: limited    Laborat mucoperiosteal thickening within the floor of the right sphenoid sinus. 12/16/19  EEG REPORT   IMPRESSION:  This is an abnormal study due to moderate to severe generalized slowing. This is indicative of diffuse cerebral dysfunction.   This may be due

## 2019-12-18 NOTE — PLAN OF CARE
Assumed care at [address] [place] [address]. Pt A&O X 1-2. Able to state her name but not birthday. Unable to state the year, but is able to state that Omayra is coming up. Knows she is in the hospital.  Able to follow simple commands.    Neuro checks q 4 hours, see kassandra Cognition  Goal: Patient will exhibit improved attention, thought processing and/or memory  Description    Outcome: Progressing     Problem: Impaired Communication  Goal: Patient will achieve maximal communication potential  Description  Interventions:

## 2019-12-18 NOTE — PROGRESS NOTES
NEUROLOGY PROGRESS NOTE     SUBJECTIVE:     Interval History: No events reported overnight. No new neurological symptoms. Walked with PT this AM but felt nauseous.     OBJECTIVE   Temp:  [98.2 °F (36.8 °C)-99.1 °F (37.3 °C)] 98.6 °F (37 °C)  Pulse:  [90-96] lidocaine-menthol  1 patch Transdermal Daily   • amLODIPine Besylate  10 mg Oral Daily   • aMILoride HCl  10 mg Oral Daily   • acetaminophen  650 mg Oral Q6H   • nicotine  1 patch Transdermal Daily   • Umeclidinium Bromide  1 puff Inhalation Daily     Cont diffuse slowing; no seizures  -PT/OT/Speech evaluation  -Avoid any sedating medications (benzos/opiates)  -Defer LP as unlikely to meningitis or encephalitis in absence of no fevers or elevated WBCs    2.  Weakness (deconditioning, hypoxemia, excessive weig

## 2019-12-18 NOTE — OCCUPATIONAL THERAPY NOTE
OCCUPATIONAL THERAPY EVALUATION - INPATIENT     Room Number: 2412/0450-F  Evaluation Date: 12/18/2019  Type of Evaluation: Initial  Presenting Problem: acute encephalopathy    Physician Order: IP Consult to Occupational Therapy  Reason for Therapy: ADL/IAD cyst removed   • CHOLECYSTECTOMY     • MOUTH ODONTECTOMY N/A 6/12/2015    Performed by Hayley Paiz DDS at [address]   • OTHER Bilateral 1969    Knee caps removed   • OTHER  2004    All upper teeth removal   • OTHER      Placement of IVC filter   • Vision: no visual deficits    PERCEPTION  Overall Perception Status:   WFL - within functional limits    SENSATION  Light touch:  intact    Communication: clear speech, slow to respond    Behavioral/Emotional/Social: pleasant    RANGE OF MOTION AND STRENGT of Session: Up in chair;Needs met;Call light within reach;RN aware of session/findings; All patient questions and concerns addressed; Alarm set    ASSESSMENT     Patient is a 75year old female admitted on 12/14/2019 for acute encephalopathy.  Complete medica eval/education; Compensatory technique education  Rehab Potential : Fair  Frequency (Obs): 5x/week  Number of Visits to Meet Established Goals: 5    ADL Goals   Patient will perform grooming: with supervision  Patient will perform upper body dressing:  with

## 2019-12-18 NOTE — RESPIRATORY THERAPY NOTE
SMITA : EQUIPMENT USE: DAILY SUMMARY                                            SET MODE:  CPAP WITH CFLEX                                          USAGE IN HOURS: 3:54                                          90% EX

## 2019-12-18 NOTE — CM/SW NOTE
Pt is a 74 yo female admitted for AMS. Pt lives at home with her  Eduardo Ferguson. They have a dtr Kaitlin who lives nearby. Pt was getting up with a walker at home. Pt has no history of HH or SNF placement. PT is recommending [facility].   SNF list given to pt's hu

## 2019-12-18 NOTE — PROGRESS NOTES
[facility] Dosing Service  Warfarin (Coumadin) Subsequent Dosing    Donny Cain is a 75year old female for whom pharmacy has been dosing warfarin (Coumadin) hypercoagulable state/protein S deficiency by Dr. Cyril Mccarty.  Based on this indication goal

## 2019-12-18 NOTE — PROGRESS NOTES
[facility] Hospitalist Progress Note     Gaetano Wheeler Patient Status:  Inpatient    1955 MRN [id number]   [facility] 7NE-A Attending Kenny Arora MD   Hosp Day # 1 PCP Morgan Aponte MD     CC: follow up    SUBJECTIVE:  Slow i 127*       Recent Labs   Lab 12/14/19  1438 12/14/19  1929 12/18/19  0555   ALT 23  --   --    AST 15  --   --    ALB 4.0 3.78 3.1*       Recent Labs   Lab 12/16/19  0353 12/16/19  0416 12/17/19 2036   PGLU 139* 137* 307*       Imaging:        Meds:   Doc LESTER  # Polyuria  - concern for diabetes insipidus in setting of lithium use  - renal following  - IVFs per renal  - hold losartan     # Hypernatremia  - per renal   - improved with D5W     # Elevated BP - improving  - amlodipine, amiloride started  - holdi

## 2019-12-18 NOTE — PHYSICAL THERAPY NOTE
PHYSICAL THERAPY TREATMENT NOTE - INPATIENT    Room Number: 4030/8072-H     Session: 1   Number of Visits to Meet Established Goals: 5    Presenting Problem: altered mental status and weakness   History related to current admission: pt is a 75year old fe nodes.    CONCLUSION:  No focal consolidation.         Problem List  Principal Problem:    Altered mental status, unspecified altered mental status type  Active Problems:    Altered mental status    Hyperglycemia    Lithium toxicity    Bipolar 1 disorder (H Static Sitting: Fair +  Dynamic Sitting: Fair           Static Standing: Poor +  Dynamic Standing: Poor    ACTIVITY TOLERANCE                         O2 impacting therapy include multiple medical issues including chronic pain. The patient presents with the following impairments decreased cognition and safety, decreased balance, gait dysfunction, decreased strength.   Functional outcome measures completed i

## 2019-12-19 NOTE — OCCUPATIONAL THERAPY NOTE
OCCUPATIONAL THERAPY TREATMENT NOTE - INPATIENT     Room Number: 1239/0308-J  Session: 1   Number of Visits to Meet Established Goals: 5    Presenting Problem: acute encephalopathy    History related to current admission: Pt was admitted from home on 12/14 at [facility] MAIN OR   • OTHER Bilateral 1969    Knee caps removed   • OTHER  2004    All upper teeth removal   • OTHER      Placement of IVC filter   • OTHER SURGICAL HISTORY      marshall knee cap removal   • REPAIR OF RECTOCELE     • TONSILLECTOMY     • TUBAL LIGAT with hygiene care. Mod A stand to sit. Delayed response to 1-2 step instructions. Min A to initiate hair grooming using a comb. Pt was left with PCT. Chair alarm on. Patient End of Session: Up in chair; With [facility] staff;Needs met;Call light within reach;RN a cognition screening.

## 2019-12-19 NOTE — PLAN OF CARE
Received report at 0700. Patient alert and oriented x2. No complaints of pain. Neuro checks q4 hours, see charting. Family concerned about confusion, feel like she is more confused today.  Fentanyl patch removed per family request. Dr. Willem Dukes ordered 12.5mg d

## 2019-12-19 NOTE — CONSULTS
[facility]  Report of Consultation    Rj Metcalf Patient Status:  Inpatient    1955 MRN [id number]   [facility] 7NE-A Attending Bruce Galaviz MD   Hosp Day # 2 PCP Anabel Leigh MD     Reason for Consultation:  VTE • Muscle weakness    • Neuropathy     bilateral feet   • Obesity, unspecified    • Osteoarthrosis, unspecified whether generalized or localized, unspecified site    • Sleep apnea    • Visual impairment     glasses     Past Surgical History:   Procedure L Daily  •  amLODIPine Besylate (NORVASC) tab 10 mg, 10 mg, Oral, Daily  •  aMILoride HCl (MIDAMOR) tab 10 mg, 10 mg, Oral, Daily  •  acetaminophen (TYLENOL) tab 650 mg, 650 mg, Oral, Q6H  •  Labetalol HCl (TRANDATE) injection 10 mg, 10 mg, Intravenous, Q6H regions.        Laboratory Data:    Lab Results   Component Value Date    WBC 17.4 12/19/2019    HGB 17.4 12/19/2019    HCT 55.1 12/19/2019    .0 12/19/2019    CREATSERUM 1.39 12/19/2019    BUN 26 12/19/2019     12/19/2019    K 3.9 12/19/2019 Chronic venous insufficiency     Chronic acquired lymphedema     SMITA on CPAP     COPD (chronic obstructive pulmonary disease) ([facility] [address]     Encounter for therapeutic drug monitoring     Sprain of other ligament of right ankle, initial encounter     SNLIBERTAD (senso [place]  12/19/2019  9:55 AM

## 2019-12-19 NOTE — PROGRESS NOTES
[facility] Hospitalist Progress Note     Zachary Marsh Patient Status:  Inpatient    1955 MRN [id number]   [facility] 7NE-A Attending Glenn Arredondo MD   Hosp Day # 2 PCP Margaret James MD     CC: follow up    SUBJECTIVE:    No a 150* 127* 149* 137* 158*    < > = values in this interval not displayed.        Recent Labs   Lab 12/14/19  1438 12/14/19  1929 12/18/19  0555 12/19/19  0558   ALT 23  --   --   --    AST 15  --   --   --    ALB 4.0 3.78 3.1* 3.2*       Recent Labs   Lab 12 Polyuria  - concern for diabetes insipidus in setting of lithium use  - renal following  - IVFs per renal  - hold losartan     # Hypernatremia- [id number]  - per renal   - improved with D5W     # Elevated BP - improving  - amlodipine, amiloride started, HCT

## 2019-12-19 NOTE — CM/SW NOTE
[facility] [address] can accept pt for MICH. Waiting to hear back from [facility]. Spoke with pt's  Park Brian who says they are going to tour both facilities tomorrow and choose one for pt. SW to f/u tomorrow with family for MICH choice. Statement Selected

## 2019-12-19 NOTE — PROGRESS NOTES
[facility]    Nephrology Progress Note    Chandler Severino Attending:  Kyler Goins MD       SUBJECTIVE:     Incontinent  Family does not think she is drinking enough fluids    PHYSICAL EXAM:     Vital Signs: BP (!) 164/107 (BP Location: Yeison Fenton MOPERCENT 4.0 12/14/2019    EOPERCENT 1.7 12/14/2019    BAPERCENT 0.2 12/14/2019    NE 7.53 12/14/2019    LYMABS 1.13 12/14/2019    MOABSO 0.37 12/14/2019    EOABSO 0.16 12/14/2019    BAABSO 0.02 12/14/2019     Lab Results   Component Value Date    CREUR 2 (FLONASE) 50 MCG/ACT nasal spray 1 spray, 1 spray, Each Nare, Nightly PRN  Ipratropium Bromide (ATROVENT) 0.06 % nasal spray 1 spray, 1 spray, Each Nare, QID PRN  ipratropium-albuterol (DUONEB) nebulizer solution 3 mL, 3 mL, Nebulization, Q6H PRN  sodium C

## 2019-12-19 NOTE — RESPIRATORY THERAPY NOTE
SMITA - Equipment Use Daily Summary:                  . Set Mode:                . Usage in hours:                . 90% Pressure (EPAP) level:                . 90% Insp. Pressure (IPAP): Yuvonne Manifold AHI:                .  Supplemental Oxygen:    LPM

## 2019-12-19 NOTE — SLP NOTE
SPEECH DAILY NOTE - INPATIENT    ASSESSMENT & PLAN   ASSESSMENT  Pt seen for dysphagia tx to assess tolerance with recommended diet, ensure proper utilization of aspiration precautions and provide pt/family education. Daughter and spouse present.  Reported any questions, please contact Karla Gandhi 87 CCC-SLP/L, pager [phone]  Speech-LanguagePathologist

## 2019-12-19 NOTE — PROGRESS NOTES
[facility]  Report of Psychiatric Progress Note    Gardenia Juan M Patient Status:  Observation    1955 MRN [id number]   [facility] 7NE-A Attending Peyton Ahumada MD   Hosp Day # 5 PCP Kaleb Murray MD     Date of Admiss cup of water due to lack of coordination. Per RN, she was more communicative this AM and talking in sentences. 12/18/19-   She is oriented to self and hospital today.  Thinks it is Feb and can't tell me year or why she is in the hospital. She feels tire teacher.  with 2 children.     Past Medical History:   Diagnosis Date   • Anxiety state, unspecified    • Back problem     sciatica, spinal stenosis   • Bipolar affective ([facility] [address]    • Blood clotting disorder (HCC)    • Blood disorder     Protein S defi COMMENTS)    Comment:Loss of vision  Lovenox [Enoxaparin]    RASH  Macrobid [Nitrofura*        Comment:SOB  Nitrofurantoin              Comment:Not sure  Other                   OTHER (SEE COMMENTS)    Comment:Steroid medication             Causes michele Resp: 16   Temp: 98.4 °F (36.9 °C)     Appearance: fair grooming  Behavior: poor eye contact    Speech: terse    Mood: unable to tell me  Affect: congruent, confused    Thought process: disorganized  Thought content: doesn't appear to be actively [place] =====  CONCLUSION:    1. No acute intracranial process noted. 2. Mild diffuse atrophy and white matter disease consistent with mild chronic small vessel ischemic changes.   3. There is high T2 signal within the left mastoid air cells concerning for poten

## 2019-12-19 NOTE — CM/SW NOTE
[facility] also accepted pt for MICH. Family to decide between [facility] and [facility] - they will tour tomorrow and make a decision.

## 2019-12-19 NOTE — PLAN OF CARE
Assumed care at [address]. Denies pain/discomfort. Neuro checks q4h, see charting for full assessment. Purewick in place draining clear, yellow urine.    Plan: PT rec MICH, family has list of facilities      Problem: Patient/Family Goals  Goal: Patient/Famil potential  Description  Interventions:  Outcome: Progressing     Problem: Delirium  Goal: Minimize duration of delirium  Description  Interventions:  - Encourage use of hearing aids, eye glasses  - Promote highest level of mobility daily  - Provide frequen

## 2019-12-19 NOTE — PROGRESS NOTES
Hematology    History of multiple VTE and protein S deficiency noted. She was diagnosed when she was 23. She has not seen a hematologist in the recent past (at least 2 years per notes) she thinks it was sometime prior to this.   Has been managed on couma

## 2019-12-19 NOTE — PLAN OF CARE
Nephrology notified of labs and elevated bp. Order to replace K. Labs ordered for am. To cont to monitor bp.

## 2019-12-19 NOTE — PROGRESS NOTES
Family expressed concerns about Fentanyl patch causing more confusion and drowsiness. Family requested d/c of Fentanyl patch. Patch to right upper chest removed. Dr. Ella Grijalva and Dr. Arabella West paged concerning patch.  Dr. Arablela West agrees with patch removal and hol

## 2019-12-19 NOTE — PROGRESS NOTES
SBP > 170 this morning. Given one dose of hydralazine, which did not work. Then given one dose of labetalol and .

## 2019-12-19 NOTE — PROGRESS NOTES
[facility]    Nephrology Progress Note    Pau Garcia Attending:  Oly Jones MD       SUBJECTIVE:     C/o thirst, states she is not getting enough to drink but she likes water  No urinary complaints - has purewick  Breathing ok  More 14.6 12/19/2019    NEPRELIM 15.78 (H) 12/19/2019    NEPERCENT 90.5 12/19/2019    LYPERCENT 5.1 12/19/2019    MOPERCENT 3.7 12/19/2019    EOPERCENT 0.0 12/19/2019    BAPERCENT 0.2 12/19/2019    NE 15.78 (H) 12/19/2019    LYMABS 0.88 (L) 12/19/2019    MOABSO PRN  Fluticasone Propionate (FLONASE) 50 MCG/ACT nasal spray 1 spray, 1 spray, Each Nare, Nightly PRN  Ipratropium Bromide (ATROVENT) 0.06 % nasal spray 1 spray, 1 spray, Each Nare, QID PRN  ipratropium-albuterol (DUONEB) nebulizer solution 3 mL, 3 mL, Neb

## 2019-12-19 NOTE — PROGRESS NOTES
NEUROLOGY PROGRESS NOTE     SUBJECTIVE:     Interval History: No events reported overnight. No new neurological symptoms. Able to recognize me and interacts well.     OBJECTIVE   Temp:  [97.3 °F (36.3 °C)-99.2 °F (37.3 °C)] 97.3 °F (36.3 °C)  Pulse:  [87-1 --   --    AST 15  --   --   --    ALB 4.0 3.78 3.1* 3.2*     Scheduled Meds:  • Potassium Chloride ER  40 mEq Oral Daily   • hydrochlorothiazide  25 mg Oral Daily   • Lithium Carbonate  600 mg Oral Nightly   • Sertraline HCl  200 mg Oral Nightly   • fenta side effects of medications. Still with intermittent episodes of confusion but overall significantly improved compared to earlier in the hospitalization. Elevated WBCs on labs but no other symptoms.  Spoke with her  over the phone regarding findings

## 2019-12-19 NOTE — CM/SW NOTE
Received a call from [facility], PAS screen has been completed. Pt's  wants a referral made to [facility] and [facility]. Referrals sent thru ecin - waiting for responses.

## 2019-12-19 NOTE — PROGRESS NOTES
[facility] Dosing Service  Warfarin (Coumadin) Subsequent Dosing    Vika Eubanks is a 75year old female for whom pharmacy has been dosing warfarin (Coumadin).  Goal INR is 2-3    Recent Labs   Lab 12/15/19  0755 12/16/19  0440 12/17/19  0549 12/18/

## 2019-12-20 NOTE — SLP NOTE
SPEECH DAILY NOTE - INPATIENT    ASSESSMENT & PLAN   ASSESSMENT  Pt seen for dysphagia tx to assess tolerance with recommended diet, ensure proper utilization of aspiration precautions and provide pt/family education.  No family present however SLP received signs or symptoms of aspiration with 90 % accuracy over 3 session(s).  Met   Goal #2 The patient/family/caregiver will demonstrate understanding and implementation of aspiration precautions and swallow strategies independently over 3 session(s).     Met wi

## 2019-12-20 NOTE — PROGRESS NOTES
Patient had episode of emesis. HR zuri and had apneic episode during emesis occurrence. Vitals stable. Family at bedside. Mouth suctioned out to prevent aspiration. IV Zofran given. Patient HOB up at 90 degrees. Will monitor patient closely.

## 2019-12-20 NOTE — DIETARY NOTE
[facility]    NUTRITION INITIAL ASSESSMENT    Pt does not meet malnutrition criteria.     NUTRITION DIAGNOSIS/PROBLEM:    Inadequate oral intake related to decreased ability to consume sufficient energy as evidenced by poor po intakes per intake record FINDINGS:     1. Body Fat/Muscle Mass: well nourished per visual exam.     2. Fluid Accumulation: well nourished per visual exam.    NUTRITION PRESCRIPTION:  Calories: 1262-0885 calories/day (15-20 calories per kg)  Protein:  grams protein/day (1.2-1

## 2019-12-20 NOTE — PROGRESS NOTES
[facility] Hospitalist Progress Note     Kaila Ambriz Patient Status:  Inpatient    1955 MRN [id number]   [facility] 7NE-A Attending Dulce Matos MD   Hosp Day # 3 PCP Anamika Champion MD     CC: follow up    SUBJECTIVE:    Ment 1.21* 1.34* 1.26* 1.39* 1.55* 1.51*   CA 10.3*   < > 10.0 9.7 9.6 10.0 10.1 10.8*   MG 2.4  --   --   --   --   --   --   --    PHOS  --   --  2.8  --   --  3.7 3.4 4.5   *   < > 127* 149* 137* 158* 154* 158*    < > = values in this interval not dis generalized slowing  - MRI negative for acute CVA  - psych consulted, apprec recs   - see mgmt of lithium toxicity and chronic pain below  - neuro checks     # Bipolar d/o  # Lithium toxicity  - level improved  - lithium re-introduced  - psych following

## 2019-12-20 NOTE — OCCUPATIONAL THERAPY NOTE
OCCUPATIONAL THERAPY TREATMENT NOTE - INPATIENT     Room Number: 8992/9853-Y  Session: 2  Number of Visits to Meet Established Goals: 5    Presenting Problem: acute encephalopathy    History related to current admission: Pt was admitted from home on 12/14 [facility] MAIN OR   • OTHER Bilateral 1969    Knee caps removed   • OTHER  2004    All upper teeth removal   • OTHER      Placement of IVC filter   • OTHER SURGICAL HISTORY      marshall knee cap removal   • REPAIR OF RECTOCELE     • TONSILLECTOMY     • TUBAL LIGATION in session 2 of 5.   Impaired attention, following instructions, sequencing, problem solving, flexible thinking, insight into deficits, balance, posture, perception, coordination, motor planning, motor control, L attention, orientation, and use of adaptive exercise program). Additional Goals:  Complete cognition screening.

## 2019-12-20 NOTE — PROGRESS NOTES
[facility]  Progress note    Kaila Ambriz Patient Status:  Inpatient    1955 MRN [id number]   [facility] 7NE-A Attending Dulce Matos MD   Hosp Day # 3 PCP Anamika Champion MD     Interval history--  Labs and coumadin NEC     CHRONIC PAIN (S/P DVTS)   • Hx of diseases NEC     PROTEIN S DEFICIENCY   • Hx of diseases NEC     CHRONIC CONSTIPATION   • Incontinence    • Muscle weakness    • Neuropathy     bilateral feet   • Obesity, unspecified    • Osteoarthrosis, unspecifi mg, Oral, Nightly  •  Sertraline HCl (ZOLOFT) tab 200 mg, 200 mg, Oral, Nightly  •  lidocaine-menthol 4-1 % 1 patch, 1 patch, Transdermal, Daily  •  amLODIPine Besylate (NORVASC) tab 10 mg, 10 mg, Oral, Daily  •  aMILoride HCl (MIDAMOR) tab 10 mg, 10 mg, O Lymphatics: There is no palpable lymphadenopathy throughout in the cervical,            supraclavicular, axillary, or inguinal regions.        Laboratory Data:    Lab Results   Component Value Date    CREATSERUM 1.51 12/20/2019    BUN 34 12/20/2019    NA by: Saleem Mckeon MD on 12/15/2019 at 18:47          Xr Chest Ap Portable  (cpt=71045)    Result Date: 12/14/2019  CONCLUSION:  No focal consolidation.     Dictated by: Shila Byrne MD on 12/14/2019 at 16:49     Approved by: Shila Byrne MD on 12/14/2019 at 16:50 was therapeutic initially upon admission, but then went up to 5. INR remains subtherapeutic yesterday, INR was 1.2 yesterday. Pending today.   Pharmacy has adjusted the dosing per the notes.     Agree with pharmacy that this rapid rise may have been due t

## 2019-12-20 NOTE — PHYSICAL THERAPY NOTE
PHYSICAL THERAPY TREATMENT NOTE - INPATIENT    Room Number: 0375/9431-X     Session: 2  Number of Visits to Meet Established Goals: 5    Presenting Problem: altered mental status and weakness   History related to current admission: pt is a 75year old fem nodes.    CONCLUSION:  No focal consolidation.         Problem List  Principal Problem:    Altered mental status, unspecified altered mental status type  Active Problems:    Altered mental status    Hyperglycemia    Lithium toxicity    Bipolar 1 disorder (H Static Sitting: Fair +  Dynamic Sitting: Fair           Static Standing: Poor +  Dynamic Standing: Poor    ACTIVITY TOLERANCE                         O2 WALK Assessment  NA    Axillary Transfers/Environmental Barriers    Toilet/Commode Transfers: NA  Stairs: NA  Curb Step: NA        Patient End of Session: Up in chair;Needs met;Call light within reach;RN aware of session/findings; All patient questions and gunnar

## 2019-12-20 NOTE — PLAN OF CARE
Problem: Impaired Swallowing  Goal: Minimize aspiration risk  Description  Interventions: Recommend 1:1 feeder assistance     - Patient should be alert and upright for all feedings (90 degrees preferred)  - Offer food and liquids at a slow rate  - No str

## 2019-12-20 NOTE — PLAN OF CARE
Received report at 0700. Patient alert and oriented x2-3. No complaints of pain at this time. Up to chair this AM with PT, did well. More alert today than yesterday and previous days. Family deciding on placement for MICH. BUN and creatinine still elevated.

## 2019-12-20 NOTE — RESPIRATORY THERAPY NOTE
SMITA - Equipment Use Daily Summary:                  . Set Mode: CPAP                . Usage in hours: 11:49                . 90% Pressure (EPAP) level: 7.0                . 90% Insp. Pressure (IPAP): Kiara Branham AHI: 1.3                .  Supplemental O

## 2019-12-20 NOTE — PROGRESS NOTES
[facility]    Nephrology Progress Note    Jesus Arambula Attending:  Rohit Mackey MD       SUBJECTIVE:     C/o thirst, states she is not getting enough to drink but she likes water  No urinary complaints - has purewick  Breathing ok  More 12/20/2019    RDW 14.7 12/20/2019    NEPRELIM 18.11 (H) 12/20/2019    NEPERCENT 89.8 12/20/2019    LYPERCENT 5.5 12/20/2019    MOPERCENT 3.9 12/20/2019    EOPERCENT 0.1 12/20/2019    BAPERCENT 0.1 12/20/2019    NE 18.11 (H) 12/20/2019    LYMABS 1.11 12/20/ PRN  Albuterol Sulfate  (90 Base) MCG/ACT inhaler 1-2 puff, 1-2 puff, Inhalation, Q6H PRN  Fluticasone Propionate (FLONASE) 50 MCG/ACT nasal spray 1 spray, 1 spray, Each Nare, Nightly PRN  Ipratropium Bromide (ATROVENT) 0.06 % nasal spray 1 spray, 1

## 2019-12-20 NOTE — PLAN OF CARE
Assumed care at [address]. Patient A&Ox1-2, knows name and that she is in the hospital.  Neuros q 4, no acute changes. See chart for deficits. Tele SR, on room air. CPAP HS. Denies pain. IVF as ordered. No s/s of distress. Will cont to monitor.

## 2019-12-20 NOTE — CM/SW NOTE
Pt's  Leonor Hoskins called to say that he wants pt to go to [facility] for MICH. Called Kathy at [facility] [phone] to make her aware pt will be coming to them. Pt is not yet ready for d/c.

## 2019-12-21 NOTE — PROGRESS NOTES
[facility] Hospitalist Progress Note     Sergey Vela Patient Status:  Inpatient    1955 MRN [id number]   [facility] 7NE-A Attending Domingo Yepez MD   Hosp Day # 4 PCP Richelle Benavidez MD     CC: follow up    SUBJECTIVE:    Sist 1.05*   < > 1.21*   < > 1.26* 1.39* 1.55* 1.51* 1.54*   CA 10.3*   < > 10.0   < > 9.6 10.0 10.1 10.8* 10.3*   MG 2.4  --   --   --   --   --   --   --   --    PHOS  --   --  2.8  --   --  3.7 3.4 4.5 4.0   *   < > 127*   < > 137* 158* 154* 158* 153* consulted, apprec recs  - EEG negative for seizure, + generalized slowing  - MRI negative for acute CVA  - psych consulted, apprec recs   - see mgmt of lithium toxicity and chronic pain below  - neuro checks     # Bipolar d/o  # Lithium toxicity  - level i

## 2019-12-21 NOTE — PLAN OF CARE
Received pt a/ox2, RA, NSR on tele at 0700  Pt denies any pain at this time  Aspiration precautions maintained  Dextrose IV discontinued   Pt tolerating meds crushed in applesauce  Neuros Q4, deficits on flowsheet  Lidocaine patch on R knee and nicotine pa memory  Description  Interventions:    Outcome: Progressing     Problem: Impaired Communication  Goal: Patient will achieve maximal communication potential  Description  Interventions:    Outcome: Progressing     Problem: Delirium  Goal: Minimize duration

## 2019-12-21 NOTE — PLAN OF CARE
Assumed care at 1900. Alert and oriented  2-3, confusion and forgetfulness. Telemetry monitor reading SR. IVF infusing assessed and maintained. Neuro checks remain unchanged.  Daughter does not want mother to know that her  is in hospital. Plan is to

## 2019-12-21 NOTE — PROGRESS NOTES
[facility]  Report of Psychiatric Progress Note    Jesus Arambula Patient Status:  Observation    1955 MRN [id number]   [facility] 7NE-A Attending Rohit Mackey MD   Hosp Day # 6 PCP Betty Nieto MD     Date of Admiss Shauna. 4) She does NOT have decision making capacity at this time due to her delirium. Her daughter is REGULO Sparks    Subjective:  12/20/19- She shares how she thought people were doing a [place] ritual in the hospital this afternoon.  It was 10/325 1 tab every 6 hrs prn filled in 12/2019  Valium 5mg nightly last filled 8/12/19  90 tabs    Dr. Julio Sanchez pain physician prescribed opioids. Dr. Gallo [facility] psychiatrist prescribed valium. Li level 0.9 today.  She has been inattentive and disorganized and Family History   Adopted: Yes   Family history unknown: Yes      reports that she has been smoking cigarettes. She has a 40.00 pack-year smoking history. She has never used smokeless tobacco. She reports that she does not drink alcohol or use drugs. Inhalation, Q6H PRN  •  Fluticasone Propionate (FLONASE) 50 MCG/ACT nasal spray 1 spray, 1 spray, Each Nare, Nightly PRN  •  Ipratropium Bromide (ATROVENT) 0.06 % nasal spray 1 spray, 1 spray, Each Nare, QID PRN  •  ipratropium-albuterol (DUONEB) nebulizer Technologist)  Altered mental status. FINDINGS:    INTRACRANIAL:  No acute intracranial hemorrhage, mass effect or midline shift noted. There is minimal periventricular subcortical white matter disease and minimal atrophy.   Diffusion-weighted imag

## 2019-12-21 NOTE — RESPIRATORY THERAPY NOTE
SMITA : EQUIPMENT USE: DAILY SUMMARY                                            SET MODE:  CPAP WITH CFLEX                                          USAGE IN HOURS:6:12                                          90% EXP

## 2019-12-21 NOTE — PROGRESS NOTES
[facility] Dosing Service  Warfarin (Coumadin) Subsequent Dosing    Prasanna De La O is a 75year old female with a history of hypercoagulable state/protein S deficiency for whom pharmacy has been dosing warfarin (Coumadin) per consult from Dr Melissa Holliday.

## 2019-12-22 NOTE — PROGRESS NOTES
[facility]    Nephrology Progress Note    Kashmir Marte Attending:  Estefania Goyal MD       SUBJECTIVE:     C/o thirst, states she is not getting enough to drink but she likes water  No urinary complaints - has purewick  Breathing ok  More RDW 14.2 12/21/2019    NEPRELIM 18.79 (H) 12/21/2019    NEPERCENT 90.4 12/21/2019    LYPERCENT 5.5 12/21/2019    MOPERCENT 3.4 12/21/2019    EOPERCENT 0.1 12/21/2019    BAPERCENT 0.1 12/21/2019    NE 18.79 (H) 12/21/2019    LYMABS 1.14 12/21/2019    MOABSO PRN  Albuterol Sulfate  (90 Base) MCG/ACT inhaler 1-2 puff, 1-2 puff, Inhalation, Q6H PRN  Fluticasone Propionate (FLONASE) 50 MCG/ACT nasal spray 1 spray, 1 spray, Each Nare, Nightly PRN  Ipratropium Bromide (ATROVENT) 0.06 % nasal spray 1 spray, 1

## 2019-12-22 NOTE — PLAN OF CARE
Patient is alert but confused. Oxygen saturation remains above 90% on room air. NSR on telemetry. Reglan administered for complaints of nausea. No episodes of emesis. Receiving scheduled Tylenol for pain control. Reports occasional mild discomfort.   SL Impaired Cognition  Goal: Patient will exhibit improved attention, thought processing and/or memory  Description  Interventions:  - Minimize distractions in the room when full attention is required  - Consider use of a daily journal to improve memory and r

## 2019-12-22 NOTE — PROGRESS NOTES
[facility]    Nephrology Progress Note    Gm Shook Attending:  Rey Leonard MD       SUBJECTIVE:     Has nausea  She was able to maintain a good sodium level but unfortunately the creatinine got worse  Not exactly sure what her base 12/21/2019    MCHC 32.8 12/21/2019    RDW 14.2 12/21/2019    NEPRELIM 18.79 (H) 12/21/2019    NEPERCENT 90.4 12/21/2019    LYPERCENT 5.5 12/21/2019    MOPERCENT 3.4 12/21/2019    EOPERCENT 0.1 12/21/2019    BAPERCENT 0.1 12/21/2019    NE 18.79 (H) 12/21/20 10 mg, 10 mg, Intravenous, Q6H PRN  Albuterol Sulfate  (90 Base) MCG/ACT inhaler 1-2 puff, 1-2 puff, Inhalation, Q6H PRN  Fluticasone Propionate (FLONASE) 50 MCG/ACT nasal spray 1 spray, 1 spray, Each Nare, Nightly PRN  Ipratropium Bromide (ATROVENT

## 2019-12-22 NOTE — PROGRESS NOTES
[facility] Dosing Service  Warfarin (Coumadin) Subsequent Dosing    Donny Cain is a 75year old female with a history of hypercoagulable state/protein S deficiency for whom pharmacy has been dosing warfarin (Coumadin) per consult from Dr Cyril Mccarty.

## 2019-12-22 NOTE — PROGRESS NOTES
[facility] Hospitalist Progress Note     Morris Kathy Patient Status:  Inpatient    1955 MRN [id number]   [facility] 7NE-A Attending Anyi oH MD   Hosp Day # 5 PCP Jennifer Lomeli MD     CC: follow up    SUBJECTIVE:    Bety La --   --    ALB 3.2* 3.2* 3.2* 3.2* 3.3*       Recent Labs   Lab 12/20/19  0848 12/20/19  1746 12/21/19  0903 12/21/19  1150 12/21/19  1745   PGLU 145* 150* 146* 172* 126*       Imaging:        Meds:   Scheduled Medication:  • fentaNYL  1 patch Cherry Scarce cr, encourage po. tara rn and family    # Hypernatremia- resolved  - per renal   - improved with D5W     # Elevated BP - improving  - amlodipine, amiloride started, HCTZ started  - holding ARB d/t LESTER  - PRN hydralazine, labetalol  - apprec renal recs     #

## 2019-12-23 NOTE — DIETARY NOTE
[facility]    NUTRITION ASSESSMENT    Pt does not meet malnutrition criteria. NUTRITION DIAGNOSIS/PROBLEM:    Inadequate oral intake related to decreased ability to consume sufficient energy as evidenced by poor po intakes per intake record.  - cont oz)  10/30/19 : 113.4 kg (250 lb)  08/07/19 : 113.4 kg (250 lb)  08/02/19 : 113.4 kg (250 lb)  07/30/19 : 113.4 kg (250 lb)    NUTRITION:  Diet: Pureed, Cardiac with thin liquids  Oral Supplements: none    FOOD/NUTRITION RELATED HISTORY:  Appetite: Poor  I

## 2019-12-23 NOTE — PLAN OF CARE
Patient remains alert but confused. CPAP on while sleeping. Oxygen saturation remains above 90%. Denies shortness of breath. No complaints of nausea. Reports minimal pain. Receiving scheduled Tylenol. Aspiration precautions maintained.   Patient arleen and/or memory  Description  Interventions:  - Minimize distractions in the room when full attention is required  Outcome: Progressing

## 2019-12-23 NOTE — PROGRESS NOTES
[facility]  Report of Psychiatric Progress Note    Sergey Vela Patient Status:  Observation    1955 MRN [id number]   [facility] 7NE-A Attending Domingo Yepez MD   Hosp Day # 6 PCP Richelle Benavidez MD     Date of Admiss She shares how she thought people were doing a [place] ritual in the hospital this afternoon. It was not scary, but strange. She feels anxious and fatigued and has some depressed mood. She denies voices or visions. Feels safe, no paranoid ideation.  She c psychiatrist prescribed valium. Li level 0.9 today. She has been inattentive and disorganized and disoriented per staff. Does not appear to be hallucinating per staff and family. She is unable to answer my questions. Mumbles.      Past Psychiatric Histo history. She has never used smokeless tobacco. She reports that she does not drink alcohol or use drugs.     Allergies:    Pcn [Penicillin V]      RASH    Comment:rashes  Radiology Contrast *    RASH    Comment:Rash, swelling  Cephalosporins              Co ipratropium-albuterol (DUONEB) nebulizer solution 3 mL, 3 mL, Nebulization, Q6H PRN  •  sodium Chloride 0.9 % nebulizer solution 3 mL, 3 mL, Nebulization, PRN  •  Umeclidinium Bromide (INCRUSE ELLIPTA) 62.5 MCG/INH inhaler 1 puff, 1 puff, Inhalation, Daily VENTRICLES/SULCI:   Ventricles and sulci are normal in caliber. There are no extra-axial fluid collections. There is no midline shift. SINUSES/ORBITS:       There is mild mucoperiosteal thickening within the floor of the right sphenoid sinus.   MASTOIDS:

## 2019-12-23 NOTE — PROGRESS NOTES
[facility]    Nephrology Progress Note    Steffi Clark Attending:  Rasheed Vegas MD       SUBJECTIVE:   Patient is unfortunately worse today  Her mental status has deteriorated  Creatinine is up  Acidosis is worse  She has a increased ox MCHC 32.8 12/21/2019    RDW 14.2 12/21/2019    NEPRELIM 18.79 (H) 12/21/2019    NEPERCENT 90.4 12/21/2019    LYPERCENT 5.5 12/21/2019    MOPERCENT 3.4 12/21/2019    EOPERCENT 0.1 12/21/2019    BAPERCENT 0.1 12/21/2019    NE 18.79 (H) 12/21/2019    LYMABS 1 Inhalation, Q6H PRN  Fluticasone Propionate (FLONASE) 50 MCG/ACT nasal spray 1 spray, 1 spray, Each Nare, Nightly PRN  Ipratropium Bromide (ATROVENT) 0.06 % nasal spray 1 spray, 1 spray, Each Nare, QID PRN  ipratropium-albuterol (DUONEB) nebulizer solution

## 2019-12-23 NOTE — CONSULTS
Pharmacy Dosing Service: Warfarin (Coumadin)    Steffi Clark is a 75year old female for whom pharmacy has been dosing warfarin (Coumadin).  Goal INR is 2-3    Recent Labs   Lab 12/19/19  0558 12/20/19  3243 12/21/19  0557 12/22/19  7771 12/23/19  053

## 2019-12-23 NOTE — RESPIRATORY THERAPY NOTE
SMITA - Equipment Use Daily Summary:                  . Set Mode: CPAP                . Usage in hours: 3:27                . 90% Pressure (EPAP) level: 7.0                . 90% Insp. Pressure (IPAP): Luis Topete AHI: 7.0                .  Supplemental Ox

## 2019-12-23 NOTE — SLP NOTE
Attempted to see pt for speech therapy services, pt's family at bedside reported minimal PO intake. Pt lethargic and not appropriate for PO trials at this time.  Discussed rationale for follow up for possible diet advancement to soft solids, pt's family jorge

## 2019-12-23 NOTE — PROGRESS NOTES
[facility] Hospitalist Progress Note     Kleverchilango Ochoa Patient Status:  Inpatient    1955 MRN [id number]   [facility] 7NE-A Attending Ronda Rivera MD   Hosp Day # 6 PCP Perla Albright MD     CC: follow up    SUBJECTIVE:    She Recent Labs   Lab 12/20/19  0848 12/20/19  1746 12/21/19  0903 12/21/19  1150 12/21/19  1745   PGLU 145* 150* 146* 172* 126*       Imaging:  Xr Chest Ap/pa (1 View) (cpt=71045)    Result Date: 12/22/2019  CONCLUSION:  The heart size is within normal oxy, benzo, oxybutinin, etc. as OP  - we are holding all these meds    - HCT negative for acute process  - ammonia level wnl  - no hypercarbia on blood gas  - neuro consulted, apprec recs  - EEG negative for seizure, + generalized slowing  - MRI negative f

## 2019-12-23 NOTE — PLAN OF CARE
Assumed care of pt at 07:00 am  A/O x 2. VSS. SPO2 maintained on RA. CPAP when sleeping. Neuro assessments q 4 hr, no new neuro deficits. Aspiration precautions maintained. Pt tolerating meds crushed in applesauce. Family refusing Fentanyl patch.    Pt memory  Description  Interventions:    Outcome: Progressing     Problem: Impaired Communication  Goal: Patient will achieve maximal communication potential  Description  Interventions:    Outcome: Progressing     Problem: Delirium  Goal: Minimize duration

## 2019-12-24 NOTE — PROGRESS NOTES
[facility]  Report of Psychiatric Progress Note    Dana Escalona Patient Status:  Observation    1955 MRN [id number]   [facility] 7NE-A Attending Yokasta Donnelly MD   Hosp Day # 6 PCP Nano Lozano MD     Date of Admiss unable to answer questions. She has been off the Fentanyl patch since 12/20.     12/20/19- She shares how she thought people were doing a [place] ritual in the hospital this afternoon. It was not scary, but strange.  She feels anxious and fatigued and has nightly last filled 8/12/19  90 tabs    Dr. Trent Miranda pain physician prescribed opioids. Dr. Gautam Lopez psychiatrist prescribed valium. Li level 0.9 today. She has been inattentive and disorganized and disoriented per staff.  Does not appear to be hallucinating Yes      reports that she has been smoking cigarettes. She has a 40.00 pack-year smoking history. She has never used smokeless tobacco. She reports that she does not drink alcohol or use drugs.     Allergies:    Pcn [Penicillin V]      RASH    Comment:kyler PRN  •  ipratropium-albuterol (DUONEB) nebulizer solution 3 mL, 3 mL, Nebulization, Q6H PRN  •  sodium Chloride 0.9 % nebulizer solution 3 mL, 3 mL, Nebulization, PRN  •  Umeclidinium Bromide (INCRUSE ELLIPTA) 62.5 MCG/INH inhaler 1 puff, 1 puff, Inhalatio status. FINDINGS:    INTRACRANIAL:  No acute intracranial hemorrhage, mass effect or midline shift noted. There is minimal periventricular subcortical white matter disease and minimal atrophy.   Diffusion-weighted imaging is negative for regions of

## 2019-12-24 NOTE — SLP NOTE
SPEECH DAILY NOTE - INPATIENT    ASSESSMENT & PLAN   ASSESSMENT  Pt seen for dysphagia tx to assess tolerance with recommended diet, ensure proper utilization of aspiration precautions and provide pt/family education. No family present.  Pt received awake, thin liquids without overt signs or symptoms of aspiration with 90 % accuracy over 3 session(s).  Met   Goal #2 The patient/family/caregiver will demonstrate understanding and implementation of aspiration precautions and swallow strategies independently ov

## 2019-12-24 NOTE — PLAN OF CARE
Assumed care of pt at 07:00 am  A/O x 2. VSS. SPO2 maintained on RA. CPAP when sleeping. Neuro assessments q 4 hr, no new neuro deficits. Aspiration precautions maintained. Pt tolerating meds crushed in applesauce.   Pt, daughter and  updated w Progressing     Problem: Impaired Communication  Goal: Patient will achieve maximal communication potential  Description  Interventions:    Outcome: Progressing     Problem: Delirium  Goal: Minimize duration of delirium  Description  Interventions:  - [place]

## 2019-12-24 NOTE — PROGRESS NOTES
[facility] Dosing Service  Warfarin (Coumadin) Subsequent Dosing    Prasanna De La O is a 75year old female for whom pharmacy has been dosing warfarin (Coumadin).  Goal INR is 2-3    Recent Labs   Lab 12/20/19  0868 12/21/19  0557 12/22/19  7197 12/23/

## 2019-12-24 NOTE — PLAN OF CARE
Assumed care @ 1900. Patient alert oriented x1-2  On telemetry monitoring. Neuro checks q4  Denies SOB, Denies any pain. On cpap machine  Updated patient/family  with plan of care, verbalizes understanding. CXR, Xray of the abd. Done, labs done.   Dr. Gomez Erm Progressing

## 2019-12-24 NOTE — PHYSICAL THERAPY NOTE
PHYSICAL THERAPY TREATMENT NOTE - INPATIENT    Room Number: 6274/3062-Q     Session: 3  Number of Visits to Meet Established Goals: 5    Presenting Problem: altered mental status and weakness   History related to current admission: pt is a 75year old fem nodes.    CONCLUSION:  No focal consolidation.         Problem List  Principal Problem:    Altered mental status, unspecified altered mental status type  Active Problems:    Altered mental status    Hyperglycemia    Lithium toxicity    Bipolar 1 disorder (H Static Sitting: Fair +  Dynamic Sitting: Fair           Static Standing: Poor +  Dynamic Standing: Poor    ACTIVITY TOLERANCE                         O2 easily. Refused to transfer in chair, education provided on importance. Patient End of Session: In bed;Needs met;Call light within reach; Alarm set    ASSESSMENT     Pt lethargic, difficulty keeping eyes open, difficulty following commands for mobil

## 2019-12-24 NOTE — PROGRESS NOTES
[facility] Pharmacy Note:  Renal Dose Adjustment for Metoclopramide (REGLAN)    Gardenia Mcgowan has been prescribed Metoclopramide (REGLAN) 10 mg oral every 8 hours as needed for nausea.     Estimated Creatinine Clearance: 27.5 mL/min (A) (based on SCr of 2.25

## 2019-12-24 NOTE — PROGRESS NOTES
[facility]    Nephrology Progress Note    Agatha Hernandez Attending:  Elia Coleman MD       SUBJECTIVE:   Overall she is still altered  Labs are no better  Lithium level slightly elevated  Abdomen is more distended    PHYSICAL EXAM:     Vi 12/24/2019    CO2 21.0 12/24/2019     Lab Results   Component Value Date    WBC 19.1 (H) 12/24/2019    RBC 5.79 (H) 12/24/2019    HGB 16.9 (H) 12/24/2019    HCT 52.2 (H) 12/24/2019    .0 12/24/2019    MPV 10.1 (H) 12/16/2011    MCV 90.2 12/24/2019 Intravenous, Q6H PRN  nicotine (NICODERM CQ) 7 MG/24HR 1 patch, 1 patch, Transdermal, Daily  ondansetron HCl (ZOFRAN) injection 4 mg, 4 mg, Intravenous, Q6H PRN  hydrALAzine HCl (APRESOLINE) injection 10 mg, 10 mg, Intravenous, Q6H PRN  Albuterol Sulfate H

## 2019-12-24 NOTE — PROGRESS NOTES
[facility] Hospitalist Progress Note     Gaetano Wheeler Patient Status:  Inpatient    1955 MRN [id number]   [facility] 7NE-A Attending Kenny Arora MD   Hosp Day # 7 PCP Morgan Aponte MD     CC: follow up    SUBJECTIVE:  Pt lay 12/21/19  0557 12/22/19  0642 12/23/19  0532 12/24/19  0601   ALT 23  --   --   --   --  31   AST 24  --   --   --   --  18   ALB 3.2* 3.2* 3.2* 3.3* 3.1* 2.8*  2.8*       Recent Labs   Lab 12/20/19  0848 12/20/19  1746 12/21/19  0903 12/21/19  1150 12/21/ toxicity - pt on fentanyl patch, PRN oxy, benzo, oxybutinin, etc. as OP  - we are holding all these meds    - HCT negative for acute process  - ammonia level wnl  - no hypercarbia on blood gas  - neuro consulted, apprec recs  - EEG negative for seizure, + with patient, RN Margaret Benson, Dr. Joe Gunter. Reviewed chart including previous progress notes    Myron Guidry MD  [facility] Hospitalist  [phone]  12/24/2019  2:30 PM

## 2019-12-24 NOTE — PLAN OF CARE
Received patient A/Ox1, on CPAP, NSR on tele at 0700  Patient has neuro q shift, see flowsheet  Patient up with PT, was able to stand and then requested to sit back down  Patient given suppository, no bowel movement  Abdomen distended, CT of abdomen comple improved attention, thought processing and/or memory  Description  Interventions:  Outcome: Progressing     Problem: Impaired Communication  Goal: Patient will achieve maximal communication potential  Description  Interventions:    Outcome: Progressing

## 2019-12-25 NOTE — PROGRESS NOTES
[facility]    Nephrology Progress Note    Joshua Cid Attending:  Abdiel Hart MD       SUBJECTIVE:     Overall the patient is worsening doing better today  Her mental status is by no means back to baseline but it is improved  She had 31.5 12/25/2019    RDW 14.3 12/25/2019    NEPRELIM 16.59 (H) 12/24/2019    NEPERCENT 86.7 12/24/2019    LYPERCENT 5.5 12/24/2019    MOPERCENT 6.9 12/24/2019    EOPERCENT 0.1 12/24/2019    BAPERCENT 0.2 12/24/2019    NE 16.59 (H) 12/24/2019    LYMABS 1.06 1 MCG/ACT nasal spray 1 spray, 1 spray, Each Nare, Nightly PRN  Ipratropium Bromide (ATROVENT) 0.06 % nasal spray 1 spray, 1 spray, Each Nare, QID PRN  ipratropium-albuterol (DUONEB) nebulizer solution 3 mL, 3 mL, Nebulization, Q6H PRN  sodium Chloride 0.9 %

## 2019-12-25 NOTE — RESPIRATORY THERAPY NOTE
SMITA - Equipment Use Daily Summary:                  . Set Mode: CPAP                . Usage in hours: 6:00                 . 90% Pressure (EPAP) level: 7.0                . 90% Insp. Pressure (IPAP): Liam Kate AHI: 11.9                .  Supplemental

## 2019-12-25 NOTE — PLAN OF CARE
Alert x1-2, mentation better later in day. Slurred and delayed responses. Waxes and wanes. Daughter noting an improvement in mentation. Sats in mid 90s on RA. CPAP while asleep. NSR per tele. C/o back pain, tylenol and lidocaine patch given.    Order set

## 2019-12-25 NOTE — PROGRESS NOTES
[facility] Hospitalist Progress Note     Kali Campuzano Patient Status:  Inpatient    1955 MRN [id number]   [facility] 7NE-A Attending Ellyn Martin MD   Hosp Day # 8 PCP Jhoan Lubin MD     CC: follow up    SUBJECTIVE:  Pt lay 1.94* 2.21* 2.25* 2.41*  2.41* 2.31*   CA 10.3* 10.0 10.4* 9.9 9.4  9.4 9.2   MG  --   --   --   --   --  2.9*   PHOS 4.0 5.4* 6.2*  --  5.0* 3.7   * 131* 126* 160* 156*  156* 139*       Recent Labs   Lab 12/19/19  1605  12/21/19  0557 12/22/19  064 see mgmt of lithium toxicity and chronic pain below  - neuro checks     # Bipolar d/o  # Lithium toxicity  - level improved  - lithium re-introduced  - psych following     # LESTER  # Polyuria  # hyperkalemia  - elevated cr may be new baseline  - concern for

## 2019-12-25 NOTE — CONSULTS
[address] [facility] Dosing Service  Warfarin (Coumadin) Subsequent Dosing    Donny Cain is a 75year old female for whom pharmacy has been dosing warfarin (Coumadin).  Goal INR is 2-3    Recent Labs   Lab 12/21/19  0557 12/22/19  6573 12/23/19  0532 12/24/

## 2019-12-25 NOTE — PLAN OF CARE
Assumed care @ 1900. Patient alert oriented x1 confused  On telemetry monitoring. Denies SOB, Denies any pain. Abdomen distended, heavy  bm after enema  Updated patient with plan of care, needs reinforcement.   Cpap all night  Ensures patient is safe at conversation  Outcome: Progressing

## 2019-12-26 NOTE — PROGRESS NOTES
[address] [facility] Dosing Service  Warfarin (Coumadin) Subsequent Dosing    Tj Watts is a 75year old female for whom pharmacy has been dosing warfarin (Coumadin).  Goal INR is 2-3    Recent Labs   Lab 12/22/19  8385 12/23/19  0532 12/24/19  0601 12/25/

## 2019-12-26 NOTE — RESPIRATORY THERAPY NOTE
SMITA - Equipment Use Daily Summary:  · Set Mode   · Usage in hours:   · 90% Pressure (EPAP) level:   · 90% Insp Pressure (IPAP):   · AHI:   · Supplemental Oxygen:  · Comments: PATIENT STILL ON UNABLE TO RETRIEVE DATA

## 2019-12-26 NOTE — OCCUPATIONAL THERAPY NOTE
OCCUPATIONAL THERAPY TREATMENT NOTE - INPATIENT     Room Number: 3693/2765-C  Session: 3   Number of Visits to Meet Established Goals: 5    Presenting Problem: acute encephalopathy    History related to current admission: Pt was admitted from home on 12/14 at [facility] MAIN OR   • OTHER Bilateral 1969    Knee caps removed   • OTHER  2004    All upper teeth removal   • OTHER      Placement of IVC filter   • OTHER SURGICAL HISTORY      marshall knee cap removal   • REPAIR OF RECTOCELE     • TONSILLECTOMY     • TUBAL LIGAT regain balance after OT asked her to stand up. Mod A to stand with RW. Once standing, min A to maintain static standing balance. Encouraged the pt to weight shift and to take side steps, but pt wanted to sit back down.  Second time standing, required max A strengthening/ROM; Cognitive reorientation;Patient/Family education;Patient/Family training;Equipment eval/education; Compensatory technique education  Rehab Potential : Fair  Frequency (Obs): 5x/week      OT Goals:    Not progressing 12/26     ADL Goals   P

## 2019-12-26 NOTE — PROGRESS NOTES
[facility]  Report of Psychiatric Progress Note    [facility] Patient Status:  Observation    1955 MRN [id number]   [facility] 7NE-A Attending Asia Desai MD   Hosp Day # 8 PCP Michi Carlson MD     Date of Admiss intake, increased Cr to 2.2 this AM. She is oriented to self and hospital only. She is drowsy and unable to answer questions.  She has been off the Fentanyl patch since 12/20.     12/20/19- She shares how she thought people were doing a [place] ritual in last filled on 12/6/19  Percocet 10/325 1 tab every 6 hrs prn filled in 12/2019  Valium 5mg nightly last filled 8/12/19  90 tabs    Dr. Raúl Hernández pain physician prescribed opioids. Dr. Celia Ley psychiatrist prescribed valium. Li level 0.9 today.  She has been TONSILLECTOMY     • TUBAL LIGATION       Family History   Adopted: Yes   Family history unknown: Yes      reports that she has been smoking cigarettes. She has a 40.00 pack-year smoking history.  She has never used smokeless tobacco. She reports that she do spray, Each Nare, Nightly PRN  •  Ipratropium Bromide (ATROVENT) 0.06 % nasal spray 1 spray, 1 spray, Each Nare, QID PRN  •  ipratropium-albuterol (DUONEB) nebulizer solution 3 mL, 3 mL, Nebulization, Q6H PRN  •  sodium Chloride 0.9 % nebulizer solution 3 FINDINGS:    INTRACRANIAL:  No acute intracranial hemorrhage, mass effect or midline shift noted. There is minimal periventricular subcortical white matter disease and minimal atrophy.   Diffusion-weighted imaging is negative for regions of restricted

## 2019-12-26 NOTE — PROGRESS NOTES
[facility]    Nephrology Progress Note    Jesus Arambula Attending:   Elaina Singh MD       SUBJECTIVE:     Thirsty  R shoulder pain noted  No urinary complaints    PHYSICAL EXAM:     Vital Signs: /87 (BP Location: Left arm)   Pulse 7 LYPERCENT 5.5 12/24/2019    MOPERCENT 6.9 12/24/2019    EOPERCENT 0.1 12/24/2019    BAPERCENT 0.2 12/24/2019    NE 16.59 (H) 12/24/2019    LYMABS 1.06 12/24/2019    MOABSO 1.31 (H) 12/24/2019    EOABSO 0.02 12/24/2019    BAABSO 0.03 12/24/2019     Lab Resu 0.06 % nasal spray 1 spray, 1 spray, Each Nare, QID PRN  ipratropium-albuterol (DUONEB) nebulizer solution 3 mL, 3 mL, Nebulization, Q6H PRN  sodium Chloride 0.9 % nebulizer solution 3 mL, 3 mL, Nebulization, PRN  Umeclidinium Bromide (INCRUSE ELLIPTA) 62.

## 2019-12-26 NOTE — PLAN OF CARE
Assumed care at Doctor Margie 89 and oriented x1, slow to respond  Neuro Q4, limited  Nasal cpap in place, spO2 90's  IVF infusing  Nicotine patch to R upper arm noted  Plan is for labs in AM, poss abd MRI??   Discussed with patient  Call light in reach Communication  Goal: Patient will achieve maximal communication potential  Description  Interventions:    Outcome: Progressing     Problem: Delirium  Goal: Minimize duration of delirium  Description  Interventions:  - Encourage use of hearing aids, eye gla

## 2019-12-27 NOTE — PLAN OF CARE
Assumed care at 1900. Alert and oriented x2, drowsy. Telemetry reading SR. IVF infusing assessed and maintained. Notified Dr. Jimmy Pacheco with BMP results. Re check BMP at 0000 and if 145 or below notify MD.  Turn q2hrs. Fall precautions maintained per protocol.

## 2019-12-27 NOTE — CONSULTS
[facility] Dosing Service  Warfarin (Coumadin) Subsequent Dosing    Gardenia Mcgowan is a 75year old female for whom pharmacy has been dosing warfarin (Coumadin).  Goal INR is 2-3    Recent Labs   Lab 12/23/19  0532 12/24/19  0601 12/25/19  0741 12/26/

## 2019-12-27 NOTE — DIETARY NOTE
[facility]    NUTRITION ASSESSMENT    Pt does not meet malnutrition criteria. NUTRITION DIAGNOSIS/PROBLEM:    Inadequate oral intake related to decreased ability to consume sufficient energy as evidenced by poor po intakes per intake record.  - cont mass index is 32.79 kg/m².   IBW:  68 kg    WEIGHT HISTORY:   Wt Readings from Last 6 Encounters:  12/27/19 : 106.6 kg (235 lb 0.2 oz)  12/16/19 : 115.7 kg (255 lb 1.2 oz)  10/30/19 : 113.4 kg (250 lb)  08/07/19 : 113.4 kg (250 lb)  08/02/19 : 113.4 kg (250

## 2019-12-27 NOTE — PROGRESS NOTES
[facility]    Nephrology Progress Note    Joshua Cid Attending:   Elmira Ge MD       SUBJECTIVE:     C/o abdominal pain diffusely  + nausea  Unclear how much she is eating  Has polyuria noted over prior 24 hours  Still off lithium RDW 14.2 12/27/2019    NEPRELIM 16.59 (H) 12/24/2019    NEPERCENT 86.7 12/24/2019    LYPERCENT 5.5 12/24/2019    MOPERCENT 6.9 12/24/2019    EOPERCENT 0.1 12/24/2019    BAPERCENT 0.2 12/24/2019    NE 16.59 (H) 12/24/2019    LYMABS 1.06 12/24/2019    MOA MCG/ACT nasal spray 1 spray, 1 spray, Each Nare, Nightly PRN  Ipratropium Bromide (ATROVENT) 0.06 % nasal spray 1 spray, 1 spray, Each Nare, QID PRN  ipratropium-albuterol (DUONEB) nebulizer solution 3 mL, 3 mL, Nebulization, Q6H PRN  sodium Chloride 0.9 %

## 2019-12-27 NOTE — PROGRESS NOTES
[facility] Hospitalist Progress Note     Tiffany Chris Patient Status:  Inpatient    1955 MRN [id number]   [facility] 7NE-A Attending Georgia Lyman MD   Hosp Day # 10 PCP Pat Marques MD     CC: follow up    SUBJECTIVE:  Pt la 2.41*  2.41* 2.31* 2.09* 2.01* 1.91* 1.81*   CA 10.4*   < > 9.4  9.4 9.2 9.6 9.2 9.0 9.0   MG  --   --   --  2.9* 2.8*  --   --  2.7*   PHOS 6.2*  --  5.0* 3.7 3.4  --   --  3.4   *   < > 156*  156* 139* 148* 168* 144* 143*    < > = values in this i seizure, + generalized slowing  - MRI negative for acute CVA  - psych consulted, apprec recs   - see mgmt of lithium toxicity and chronic pain below  - neuro checks     # Bipolar d/o  # Lithium toxicity  - level improved  - lithium re-introduced but off no restarted, INR 4.5 today  Deconditioning prevention: PT/OT--> elvis once lytes stable  Dispo: hernandez  Code: FULL    D/w RNs Trinity/Lesvia and Dr. Renita Rich.  [facility] hospitalist to resume care in AM, will discuss plan with them    MD Rj Rowe

## 2019-12-27 NOTE — CM/SW NOTE
Updates sent via [facility]IN to [facility] - phone # [phone]. Awaiting medical clearance for discharge.     Melvi Wallace LCSW

## 2019-12-27 NOTE — PROGRESS NOTES
[facility] Hospitalist Progress Note     Meir Campbell Patient Status:  Inpatient    1955 MRN [id number]   [facility] 7NE-A Attending Shana Lesches, MD   Hosp Day # 9 PCP Sujatha Reynolds MD     CC: follow up    SUBJECTIVE:  Pt lay PHOS 5.4* 6.2*  --  5.0* 3.7 3.4   * 126* 160* 156*  156* 139* 148*       Recent Labs   Lab 12/22/19  0642 12/23/19  0532 12/24/19  0601 12/25/19  0741 12/26/19  0616   ALT  --   --  31  --   --    AST  --   --  18  --   --    ALB 3.3* 3.1* 2.8* re-introduced but off now since 12/23  - psych following     # LESTER  # Polyuria  # hyperkalemia  - elevated cr may be new baseline  - concern for diabetes insipidus in setting of lithium use; D5W per renal  - renal following, thought to be pre-renal progres

## 2019-12-27 NOTE — PLAN OF CARE
Alert x1-2, mentation improving. Slurred and delayed responses. Waxes and wanes. Daughter noting an improvement in mentation. tremors noted in BUE. Sats in mid 90s on RA. CPAP while asleep. NSR per tele. C/o back pain, tylenol and lidocaine patch given.

## 2019-12-27 NOTE — PROGRESS NOTES
[facility]  Report of Psychiatric Progress Note    Jayme Ochoa Patient Status:  Observation    1955 MRN [id number]   [facility] 7NE-A Attending Ronda Rivera MD   Hosp Day # 15 PCP Perla Albright MD     Date of Admis physically, she says, \"tricky\". When asked how she feels emotionally, she says, \"still\". She has been disorganized but not agitated per staff. 12/23/19- Low po intake, increased Cr to 2.2 this AM. She is oriented to self and hospital only.  She is d Buprenorphine prn.   2) Restart Fentanyl patch 25mcg every 72 hrs, but not the Percocet prn.    12/16/19-   Per [place]:  Fentanyl 25mcg patch last filled on 12/6/19  Percocet 10/325 1 tab every 6 hrs prn filled in 12/2019  Jose • OTHER  2004    All upper teeth removal   • OTHER      Placement of IVC filter   • OTHER SURGICAL HISTORY      marshall knee cap removal   • REPAIR OF RECTOCELE     • TONSILLECTOMY     • TUBAL LIGATION       Family History   Adopted: Yes   Family history unk Albuterol Sulfate  (90 Base) MCG/ACT inhaler 1-2 puff, 1-2 puff, Inhalation, Q6H PRN  •  Fluticasone Propionate (FLONASE) 50 MCG/ACT nasal spray 1 spray, 1 spray, Each Nare, Nightly PRN  •  Ipratropium Bromide (ATROVENT) 0.06 % nasal spray 1 spray, images with FLAIR sequences and diffusion weighted images without infusion. PATIENT STATED HISTORY: (As transcribed by Technologist)  Altered mental status.          FINDINGS:    INTRACRANIAL:  No acute intracranial hemorrhage, mass effect or midline sh

## 2019-12-27 NOTE — CM/SW NOTE
CM delivered IM to patient. Initial IM was not found in system. Original IM was delivered to patient however, patient was not feeling up to signing it. CM left it at bedside.   When patients signs it, a copy will be placed on chart so it can be scanned i

## 2019-12-28 NOTE — PROGRESS NOTES
[facility] Hospitalist Progress Note     Beckamare Escalona Patient Status:  Inpatient    1955 MRN [id number]   [facility] 7NE-A Attending Sharri Iverson MD   [address] Day # 6 PCP Nano Lozano MD     CC: follow up    SUBJECTIVE:  Mentat 27.0 26.0 24.0 26.0 22.0   *  109* 108* 88* 85* 79* 67* 50*   CREATSERUM 2.41*  2.41* 2.31* 2.09* 2.01* 1.91* 1.81* 1.57*   CA 9.4  9.4 9.2 9.6 9.2 9.0 9.0 9.5   MG  --  2.9* 2.8*  --   --  2.7* 2.5   PHOS 5.0* 3.7 3.4  --   --  3.4 3.0   * consulted, apprec recs   - see mgmt of lithium toxicity and chronic pain below  - neuro checks     # Bipolar d/o  # Lithium toxicity  - level improved  - lithium re-introduced but off again now since 12/23  - psych following, follow levels, possibly try to greater than 35 minutes spent in d/w pt/family, coordination of care, and/or d/w staff.      Harry Roth MD   [facility] IM Hospitalist  Pager: [phone]

## 2019-12-28 NOTE — PROGRESS NOTES
[facility]    Nephrology Progress Note    Huyen Warren Attending:   Marilou Breaux MD       SUBJECTIVE:     mentating a little better today  No sig complaints    PHYSICAL EXAM:     Vital Signs: /83 (BP Location: Left arm)   Pulse 78 12/24/2019    LYPERCENT 5.5 12/24/2019    MOPERCENT 6.9 12/24/2019    EOPERCENT 0.1 12/24/2019    BAPERCENT 0.2 12/24/2019    NE 16.59 (H) 12/24/2019    LYMABS 1.06 12/24/2019    MOABSO 1.31 (H) 12/24/2019    EOABSO 0.02 12/24/2019    BAABSO 0.03 12/24/201 Bromide (ATROVENT) 0.06 % nasal spray 1 spray, 1 spray, Each Nare, QID PRN  ipratropium-albuterol (DUONEB) nebulizer solution 3 mL, 3 mL, Nebulization, Q6H PRN  sodium Chloride 0.9 % nebulizer solution 3 mL, 3 mL, Nebulization, PRN  Umeclidinium Bromide (I

## 2019-12-28 NOTE — PROGRESS NOTES
Assumed patient care at 0730. VSS. Patient more awake, oriented to self and place. Disoriented to time and situation. Able to follow commands. Increased appetite. Abdomen continues to be distended, hypoactive bowel sounds.  Patient reports tenderness to t

## 2019-12-28 NOTE — PLAN OF CARE
Assumed pt care at [address] [place] [address] for the night shift. Pt sleeping in no apparent distress. Alert to self and able to fallow some simple commands. VSS. Afebrile. NSR on tele. O2 sats > 92% on RA. CPAP on for the night. Abd distended, firm. (+) BS. Scheduled.  Tylenol exhibit improved attention, thought processing and/or memory  Description  Interventions:    Outcome: Not Progressing     Problem: Impaired Communication  Goal: Patient will achieve maximal communication potential  Description  Outcome: Progressing     Pro

## 2019-12-28 NOTE — CONSULTS
Critical Care Consult     Assessment / Plan:  1. Perforated bowel  - to OR shortly  - meropenem  - discussed with Dr. Danae Hunter  2. Encephalopathy - suspect due to polypharmacy  - per IM, neuor and psych  3.  Bipolar disorder, lithium toxicity  - per psych  4 site    • Sleep apnea    • Visual impairment     glasses       Past Surgical History:   Procedure Laterality Date   • BENIGN BIOPSY LEFT  1998    cyst removed   • CHOLECYSTECTOMY     • MOUTH ODONTECTOMY N/A 6/12/2015    Performed by Hakeem Velazquez DDS at [facility], Inhale 2 puffs into the lungs daily. , Disp: 1 Inhaler, Rfl: 6, 12/13/2019 at 1500  Diclofenac Sodium 1 % Transdermal Gel, Apply 2 g topically 4 (four) times daily. , Disp: 2 Tube, Rfl: 0, 12/13/2019 at 2100  ipratropium-albuterol 0.5-2 DAILY AS NEEDED, Disp: 473 mL, Rfl: 2  Sertraline HCl 100 MG Oral Tab, Take 2 tablets (200 mg total) by mouth daily. , Disp: 180 tablet, Rfl: 3  Lithium Carbonate 300 MG Oral Tab, Take 3 tablets (900 mg total) by mouth nightly., Disp: 270 tablet, Rfl: 3  di Alcohol/week: 0.0 standard drinks    Drug use: No      Family History   Adopted: Yes   Family history unknown: Yes         Exam:   12/28/19  1611 12/28/19  1728 12/28/19  1730 12/28/19  1745   BP: 137/82 126/80 138/80 129/85   BP Location: Left arm Left ar

## 2019-12-28 NOTE — PROGRESS NOTES
KUB shows markedly distended colon with free air suggestive of bowel perf. Stat gen surg consult, IV vj (PCN allergy), keep NPO. On IVFs. Transfer to ICU for closer monitoring.      Will give FFP and IV vit K for INR reversal (FFP already ordered by g

## 2019-12-28 NOTE — PROGRESS NOTES
[facility] Pharmacy Note:  Renal Adjustment for meropenem (Karina Shahid)    Prasanna De La O is a 75year old female who has been prescribed meropenem (MERREM) 500 mg every 8 hrs.   CrCl is estimated creatinine clearance is 39.4 mL/min (A) (based on SCr of 1.57 mg/dL

## 2019-12-28 NOTE — PLAN OF CARE
Assumed pt care at 0730  VSS  Pt A&Ox2  Neuros Q4  Pt slow to respond  Received pt on 2L O2 NC- weened to room air- tolerating well  Pt up to chair x2  Lidocaine patch and Tylenol given for pain  Distended abdomen and constipation- MD informed  Suppository Problem: Impaired Cognition  Goal: Patient will exhibit improved attention, thought processing and/or memory  Description  Interventions:    Outcome: Progressing     Problem: Impaired Communication  Goal: Patient will achieve maximal communication potent

## 2019-12-29 NOTE — PROGRESS NOTES
Critical Care Progress Note     Assessment / Plan:  1. Perforated cecum with peritonitis - s/p exlap with hemicolectomy and ileostomy  - NGT to LIS  - meropenem  - per surgery  2. Encephalopathy - suspect due to polypharmacy  - per IM, neuro and psych  3.

## 2019-12-29 NOTE — OPERATIVE REPORT
[address]    PATIENT'S NAME: Imelda Frost KANDICE   ATTENDING PHYSICIAN: Yuliet Escoto. Gamal Cabrera M.D. OPERATING PHYSICIAN: Angy Irving M.D.    PATIENT ACCOUNT#:   [de-identified]    LOCATION:  [address] [place] [address]  MEDICAL RECORD #:   [id number]       DATE OF antibiotic irrigation. I checked for hemostasis several times through this surgery, which was adequate, though she was a little oozy from her recent Coumadin coagulopathy. I inspected the rest of the colon. There were no other signs of perforations.   No

## 2019-12-29 NOTE — PROGRESS NOTES
Pod 1    S/p right hemicolectomy with ostomy    abd obese soft    Ostomy and mucous fistula pink    Continue ng decompression

## 2019-12-29 NOTE — PROGRESS NOTES
Pharmacy Dosing Service: Warfarin (Coumadin)    Gm Shook is a 75year old female for whom pharmacy is dosing warfarin (COUMADIN)        Goal INR: 2-3    Recent Labs     12/27/19  0555 12/28/19  0736 12/28/19  2153 12/29/19  0442   INR 2.74* 3.42*

## 2019-12-29 NOTE — PROGRESS NOTES
[facility] Hospitalist Progress Note     Kashmir Rosanna Patient Status:  Inpatient    1955 MRN [id number]   [facility] 4SW-A Attending Margoth Gabriel MD   [facility] Day # 15 PCP Mylene Escudero MD     CC: follow up    SUBJECTIVE:  S/p ri 2.31* 2.09*   < > 1.91* 1.81* 1.57* 1.61* 1.40*  1.40*   CA 9.2 9.6   < > 9.0 9.0 9.5 9.5 9.4  9.4   MG 2.9* 2.8*  --   --  2.7* 2.5  --   --    PHOS 3.7 3.4  --   --  3.4 3.0  --  4.4   * 148*   < > 144* 143* 122* 169* 142*  142*    < > = values in 12/29/19 1434   • dexmedetomidine Stopped (12/29/19 4274)     PRN Medication:morphINE sulfate **OR** morphINE sulfate **OR** morphINE sulfate, fentaNYL citrate **OR** fentaNYL citrate, acetaminophen **OR** acetaminophen **OR** acetaminophen, Labetalol HCl, holding coumadin     # COPD  - not in exacerabation  - home inhalers     # SMITA   - CPAP per protocol     # Depression  - SSRI, dosing per psych     # Chronic pain with narcotic dependence   - pt w recurrent MS changes w restarting fentanyl patch  - did not

## 2019-12-29 NOTE — PLAN OF CARE
Received care of patient at [phone]. Patient A&Ox1-2, confused. Follows commands. Room air. NSR on tele. VSS. Family at bedside. Orders received. Will continue to monitor.

## 2019-12-29 NOTE — ANESTHESIA PROCEDURE NOTES
Airway  Date/Time: 12/28/2019 7:30 PM  Urgency: elective      General Information and Staff    Patient location during procedure: OR  Anesthesiologist: Génesis Hurley MD  Performed: anesthesiologist     Indications and Patient Condition  Indications for airw

## 2019-12-29 NOTE — PLAN OF CARE
Pt to room 470 from OR at approximately 2130. Pt arrived intubated. RT at bedside, JOANIE CHOU at bedside. Pt able to follow simple commands, opens eyes to voice. Pt in pain after arrival to unit, PRN pain medication given, see MAR.  Precedex started per or

## 2019-12-29 NOTE — PROGRESS NOTES
Received pt intubated, on precedex and LR. LR changed to . 45 NS per renal. Adequate UOP. Precedex turned off, pt passed breathing trial, extubated and tolerating well on 5L nasal cannula. Pt w/new abdominal incision, approximated wt/staples.  Pt also has an

## 2019-12-29 NOTE — CONSULTS
Donald Morris is a 75year old female  Patient presents with:  Weakness      HPI: called for stat consult  74 y/o female with massively distended abdomin  Free air on xray   and other family at bedside          Past Medical History:   Diagnosis No          EXAM: abdomin  massively distended  Diffusely tender      IMAGING: xray reviewed      IMPRESSION: acute abdomin with bowel perforation   Colon 20 cm diameter    Peritonitis , coagulopathy     PLAN: exploratory laparotomy   Bowel resection  Osto

## 2019-12-29 NOTE — PROGRESS NOTES
[facility]    Nephrology Progress Note    Rocky Nielsen Attending:   Kalie Hale MD       SUBJECTIVE:     Free air noted on abd Xray yesterday  Taken to OR for R hemicolectomy and ileostomy due to perforated cecum with peritonitis  C/o ch 12/29/2019     (H) 12/29/2019     (H) 12/29/2019    CO2 25.0 12/29/2019    CO2 25.0 12/29/2019     Lab Results   Component Value Date    WBC 19.8 (H) 12/29/2019    RBC 4.72 12/29/2019    HGB 13.5 12/29/2019    HCT 44.9 12/29/2019    .0 1 (PERIDEX) 0.12 % solution 15 mL, 15 mL, Mouth/Throat, [email or web address]  Dexmedetomidine HCl in NaCl (PRECEDEX) 400 MCG/100ML premix infusion, 0.2-1.5 mcg/kg/hr, Intravenous, Continuous  Sertraline HCl (ZOLOFT) tab 100 mg, 100 mg, Oral, Nightly  carvedilol (CO neurology     R shoulder pain:  -- APAP  -- no NSAIDs in setting of LESTER     Cecum perforation and peritonitis:  -- s/p R hemicolectomy and ileostomy  -- per gen surg    Chest pain:  -- evaluation per primary; d/w nursing        Thank you for allowing me to

## 2019-12-29 NOTE — PLAN OF CARE
Li was 0.5 this AM. HOLD Lithium for now given recent hemicolectomy/ileostomy/mucous fistula placement. When she is able to take meds via NG or oral, would restart Lithium carbonate at a very low dose of 150mg nightly given LESTER.  I will be off this week and

## 2019-12-29 NOTE — BRIEF OP NOTE
Pre-Operative Diagnosis: acute abdomin with free air     Post-Operative Diagnosis: Perforated cecum with peritonitis      Procedure Performed:   Procedure(s):  EXPLORATORY LAPAROTOMY, EXTENDED RIGHT HEMICOLECTOMY, MUCOUS FISTULA  AND ILEOSTOMY PLACEMENT

## 2019-12-29 NOTE — ANESTHESIA POSTPROCEDURE EVALUATION
[address]  Patient Status:  Inpatient   Age/Gender 75year old female MRN [id number]   [facility] 4SW-A Attending Jodie Limon MD   [address] Day # 6 PCP Perla Albright MD       Anesthesia Post-op Note    Procedur

## 2019-12-29 NOTE — ANESTHESIA PREPROCEDURE EVALUATION
PRE-OP EVALUATION    Patient Name: Steffi Clark    Pre-op Diagnosis: Peritoneal cavity free air [K66.8]    Procedure(s):  EXPLORATORY LAPAROTOMY  ,COLON RESECTION AND COLOSTOMY PLACEMENT    Surgeon(s) and Role:     * Larry Gotti MD - Primary    P lidocaine-menthol 4-1 % 1 patch, 1 patch, Transdermal, Daily  [MAR Hold] amLODIPine Besylate (NORVASC) tab 10 mg, 10 mg, Oral, Daily  [MAR Hold] acetaminophen (TYLENOL) tab 650 mg, 650 mg, Oral, Q6H  [COMPLETED] Warfarin Sodium (COUMADIN) tab 3.5 mg, 3.5 m Sodium 2.5 MG Oral Tab, Take 2.5 mg by mouth nightly., Disp: , Rfl:   Oxybutynin Chloride ER 5 MG Oral Tablet 24 Hr, Decrease dose to 5 mg per day x 7 days then stop, Disp: 180 tablet, Rfl: 3  Albuterol Sulfate  (90 Base) MCG/ACT Inhalation Aero Sol Evaluation    Patient summary reviewed.     Anesthetic Complications  (-) history of anesthetic complications         GI/Hepatic/Renal  Comment: Perforated cecum with peritonitis - to OR for urgent ex lap    (+) GERD       (+) chronic renal disease (CKD sta 22.0 12/28/2019    BUN 50 (H) 12/28/2019    CREATSERUM 1.57 (H) 12/28/2019     (H) 12/28/2019    CA 9.5 12/28/2019     Lab Results   Component Value Date    INR 3.42 (H) 12/28/2019         Airway      Mallampati: I  Mouth opening: >3 FB  TM distance

## 2019-12-29 NOTE — PROGRESS NOTES
Admit from OR s/p exp lap, extended right hemicolectomy, ileostomy and mucous fistula placement. Pt left intubated and sedated.   Vitals stable    -Vent settings prvc, 450, 12, 5, 50%  -Sedation with Precedex  -PRN pain control  -NGT to LIWS  -Fernandes protoc

## 2019-12-30 NOTE — WOUND PROGRESS NOTE
[facility]  Inpatient Wound Care Contact Note    Chandler Severino Patient Status:  Inpatient    1955 MRN [id number]   [facility] 4SW-A Attending Bing Beyer MD   [facility] Day # 15 PCP Kathy Smart MD     Attempted to see jatin

## 2019-12-30 NOTE — PROGRESS NOTES
[address] [facility]  TEL: [phone]  FAX: [phone]    Gaetano Wheeler Patient Status:  Inpatient    1955 MRN [id number]   [facility] 4SW-A Attending Germaine Mcarthur MD   [facility] Day # 15 BARBARA Brewer 23*  23*   < > 38* 45*  45* 47*   GFRNAA 20*  20*   < > 33* 39*  39* 41*   CA 9.4  9.4   < > 9.5 9.4  9.4 9.8   ALB 2.8*  2.8*   < > 3.1* 2.5* 2.3*   *  149*   < > 146* 149*  149* 159*   K 4.7  4.7   < > 3.8 4.2  4.2 3.6   *  121*   < > 113* 11

## 2019-12-30 NOTE — OCCUPATIONAL THERAPY NOTE
OCCUPATIONAL THERAPY EVALUATION - INPATIENT     Room Number: 470/470-A  Evaluation Date: 12/30/2019  Type of Evaluation: Re-evaluation  Presenting Problem: acute encephalopathy; bowel perforation s/p ex-lap, right hemicolectomy, ileostomy, mucuous fistula generalized or localized, unspecified site    • Sleep apnea    • Visual impairment     glasses       Past Surgical History  Past Surgical History:   Procedure Laterality Date   • BENIGN BIOPSY LEFT  1998    cyst removed   • CHOLECYSTECTOMY     • EXPLORATOR to time and disoriented to situation  Memory:  impaired working memory, decreased recall of precautions, decreased recall of recent events and decreased short term memory  Following Commands:  follows one step commands with increased time and follows one s time and use of HOB elevation and rail. EOB sitting with supervision to min A for balance  EOB to stand with max A of 2. Tolerated standing x 45 sec w/ BUE support on walker.     Patient was able to take a few shuffling steps to the bedside recliner chair function. Subacute rehab is recommended for 12-14 days. After this period of rehabilitation patient should achieve supervision level in BADLs and functional mobility.     OT Discharge Recommendations: Sub-acute rehabilitation(ELOS: 12-14 days)  OT Devic

## 2019-12-30 NOTE — CONSULTS
[address]    PATIENT'S NAME: Chandu Mixon KANDICE   ATTENDING PHYSICIAN: Dov Sepulveda. Estuardo Pedraza M.D.   Concepcion Alas: Meryle Nailer, M.D.    PATIENT ACCOUNT#:   [de-identified]    LOCATION:  [address] [place] [address]  MEDICAL RECORD #:   [id number]       DATE [facility] Diminished at the bases, not visibly short of breath. HEART:  No tachycardia or murmur. ABDOMEN:  OR dressing in place. The family states that distention is markedly better from yesterday's evaluation.     LABORATORY DATA:  BUN 39, creatinine 1.4, trendi

## 2019-12-30 NOTE — CM/SW NOTE
Interdisciplinary Rounds: 12/30/19  Admitted: 12/14/2019 LOS: 15  Disciplines in attendance: charge nurse, staff nurse, CM, ICU APN, RT, dietician, infection control, and pharmacist.    Care plan and discharge plan reviewed.     Active issues needing resolu

## 2019-12-30 NOTE — PHYSICAL THERAPY NOTE
Chart reviewed and pt will need new orders as pt was transferred to ICU post surgical procedure. RN notified of this. Will await for clearance and new orders.  CM

## 2019-12-30 NOTE — PLAN OF CARE
Assumed care of pt for the night shift. Received pt on 5 L NC and off bilateral soft wrist restraints. Pt confused alert to self and place, disoriented to time and situation. Around 1929 pt began to desat into the upper 80s.  RT at bedside, placed on NC a

## 2019-12-30 NOTE — PROGRESS NOTES
[address]  Patient Status:  Inpatient    1955 MRN [id number]   [facility] [place] 4SW-A Attending Elba Acevedo MD   [address] Day # 15 PCP Juan العلي MD     Pulm / Critical Care Progress Note     S: overall pt without obvious abnormality, atraumatic. Lungs: ctab   Chest wall: No tenderness or deformity. Heart: Regular rate and rhythm, normal S1S2, no murmur. Abdomen: soft, postop changes, dec bs   Extremity: no edema   Skin: No rashes or lesions.        Rec critical care  12/30/2019  9:00 AM

## 2019-12-30 NOTE — PHYSICAL THERAPY NOTE
PHYSICAL THERAPY EVALUATION - INPATIENT     Room Number: 470/470-A  Evaluation Date: 12/30/2019  Type of Evaluation: Re-evaluation  Physician Order: PT Eval and Treat    Presenting Problem: AMS, perforated bowel s/p procedure  Reason for Therapy:  Mob Laterality Date   • BENIGN BIOPSY LEFT  1998    cyst removed   • CHOLECYSTECTOMY     • MOUTH ODONTECTOMY N/A 6/12/2015    Performed by Brannon Lewis DDS at [facility] MAIN OR   • OTHER Bilateral 1969    Knee caps removed   • OTHER  2004    All upper teeth remova functional limits See OT evaluation    Lower extremity ROM is within functional limits Limited AROM throughout B LE's, passive ROM also limited     Lower extremity strength is within functional limits Unable to assess with MMT due to cognition.  however obs cueing required throughout entire session. Hand over hand cueing provided to initiate and complete tasks. Pt with difficulty following commands and staying on task. Pt easily looses concentration on the task and requires constant reminders.  Pt with Max A x degree of impairment in mobility. Research supports that patients with this level of impairment may benefit from DC to [facility] Based on this evaluation, patient's clinical presentation is evolving and overall the evaluation complexity is considered high.

## 2019-12-30 NOTE — PROGRESS NOTES
[facility] Dosing Service  Warfarin (Coumadin) Subsequent Dosing    Joshua Cid is a 75year old female for whom pharmacy has been dosing warfarin (Coumadin).  Goal INR is 2-3    Recent Labs   Lab 12/27/19  0555 12/28/19  0736 12/28/19  2153 12/29/

## 2019-12-30 NOTE — PROGRESS NOTES
[facility] Hospitalist Progress Note     Steffi Amber Patient Status:  Inpatient    1955 MRN [id number]   [facility] 4SW-A Attending Shantel Dick MD   [address] Day # 15 PCP Nicky Park MD     CC: follow up    SUBJECTIVE:  Small amt 2.31* 2.09*   < > 1.81* 1.57* 1.61* 1.40*  1.40* 1.35* 1.48*   CA 9.2 9.6   < > 9.0 9.5 9.5 9.4  9.4 9.8 9.8   MG 2.9* 2.8*  --  2.7* 2.5  --   --   --   --    PHOS 3.7 3.4  --  3.4 3.0  --  4.4 2.9  --    * 148*   < > 143* 122* 169* 142*  142* 130* multifactorial 2/2 polypharmacy and lithium toxicity - pt was on fentanyl patch, oxy, benzo, oxybutinin, etc. as OP  - HCT negative for acute process  - neuro and psych consulted, apprec recs  - EEG negative for seizure, + generalized slowing  - MRI negati

## 2019-12-30 NOTE — PROGRESS NOTES
Extubated  Awake  Comfortable    Bloody output from ng    abd soft  Ostomies pink    Plan  Add protonix    Continue ng decompression

## 2019-12-30 NOTE — PROGRESS NOTES
[facility]    Nephrology Progress Note    Joshua Cid Attending:  Sandra Robledo MD       SUBJECTIVE:     Sleepy but arousable  Hypernatremia is worsening  She is very polyuric    PHYSICAL EXAM:     Vital Signs: /80   Pulse 105   Temp 12/24/2019    NEPERCENT 86.7 12/24/2019    LYPERCENT 5.5 12/24/2019    MOPERCENT 6.9 12/24/2019    EOPERCENT 0.1 12/24/2019    BAPERCENT 0.2 12/24/2019    NE 16.59 (H) 12/24/2019    LYMABS 1.06 12/24/2019    MOABSO 1.31 (H) 12/24/2019    EOABSO 0.02 12/24/ [email or web address]  Dexmedetomidine HCl in NaCl (PRECEDEX) 400 MCG/100ML premix infusion, 0.2-1.5 mcg/kg/hr, Intravenous, Continuous  Sertraline HCl (ZOLOFT) tab 100 mg, 100 mg, Oral, Nightly  carvedilol (COREG) tab 6.25 mg, 6.25 mg, Oral, BID with meals  lidoc output noted --> PPI  -- meropenem    D/w nursing.       Thank you for allowing me to participate in the care of this patient. Please do not hesitate to call with questions or concerns.         Adolfo Nunez MD  [address] Nephrology  [facility]

## 2019-12-31 NOTE — PROGRESS NOTES
Received page from RN that family remains concerned about patient's breathing. After receiving benadryl for possible \"throat swelling\", pt expectedly drowsy/sleepy. Placed on CPAP which she uses nocturnally at baseline.  Sats remain mid-high 90s on 2L

## 2019-12-31 NOTE — PROGRESS NOTES
[facility]    Nephrology Progress Note    Whit Manzano Attending:  Collette Paz MD       SUBJECTIVE:     Sleepy but arousable  Hypernatremia improving    PHYSICAL EXAM:     Vital Signs: BP (!) 151/95   Pulse 88   Temp 97.3 °F (36.3 °C) (Tem 12/31/2019    MCV 96.7 12/31/2019    MCH 28.5 12/31/2019    MCH 28.5 12/31/2019    MCHC 29.5 (L) 12/31/2019    MCHC 29.5 (L) 12/31/2019    RDW 14.1 12/31/2019    RDW 14.1 12/31/2019    NEPRELIM 11.91 (H) 12/31/2019    NEPERCENT 88.3 12/31/2019    LYPERCENT 650 mg, 650 mg, Oral, Q6H PRN    Or  acetaminophen (TYLENOL) 160 MG/5ML oral liquid 650 mg, 650 mg, Oral, Q6H PRN    Or  acetaminophen (TYLENOL) 650 MG rectal suppository 650 mg, 650 mg, Rectal, Q6H PRN  Chlorhexidine Gluconate (PERIDEX) 0.12 % solution 15 hydration  -- off lithium and amiloride since 12/23     HTN:  -- off HCTZ and amiloride in setting of LESTER  -- continue amlodipine and coreg     Bipolar disorder:  -- off lithium right now  -- per psychiatry     AMS:  -- per neurology    Cecum perforation a

## 2019-12-31 NOTE — PROGRESS NOTES
Doing well from surgery    abd soft  Ileostomy with bile    Wound fine    Plan  Clamp ng  Aspirate residual in 4 hrs  If less than 100cc d/c  And start clear liquids

## 2019-12-31 NOTE — PROGRESS NOTES
[facility] Dosing Service  Warfarin (Coumadin) Subsequent Dosing    Whit Manzano is a 75year old female for whom pharmacy has been dosing warfarin (Coumadin).  Goal INR is 2-3    Recent Labs   Lab 12/28/19  0736 12/28/19  2153 12/29/19  0442 12/30/

## 2019-12-31 NOTE — PROGRESS NOTES
[address] [place] [address]  TEL: [phone]  FAX: [phone]    Joshua Cid Patient Status:  Inpatient    1955 MRN [id number]   [facility] 4SW-A Attending Sandra Robledo MD   [address] [facility] Day # 14 PCP Maximo Rosa 2.3*   * 149*  149* 159* 162* 161* 159*   K 3.8 4.2  4.2 3.6 3.6 3.5 3.2*   * 118*  118* 129* 132* 132* 128*   CO2 28.0 25.0  25.0 25.0 24.0 25.0 26.0   ALKPHO 91  --   --   --   --   --    AST 27  --   --   --   --   --    ALT 34  --   --   --

## 2019-12-31 NOTE — PLAN OF CARE
Patient received alert to person, disoriented to place time and situation. Wore CPAP over night, weaned to 2L/NC. Continues bilateral soft wrist restraints de to pulling at tubes/lines.  Surgery at bedside, orders to clamp NGT x4 hours and check residual. P

## 2019-12-31 NOTE — PROGRESS NOTES
Notified by RN pt with sensation of throat closing, taking deep breaths. No new medications being given at the time. Reviewed med list - do not suspect any meds she is getting would have caused allergic reaction. No notable wheezing.  O2 sats 94% on

## 2019-12-31 NOTE — PLAN OF CARE
Received pt awake, follows all simple commands, hands grasp strong, moves all extremities, pt is pleasantly confused, laughing inappropriately, knows her name, and said she is in the hospital, otherwise no clue what day,date, month, year it is.  When Dearl Vito

## 2019-12-31 NOTE — PROGRESS NOTES
[address]  Patient Status:  Inpatient    1955 MRN [id number]   [facility] 4SW-A Attending Ewelina Tate MD   [address] Day # 15 PCP Paddy Mann MD     Critical Care Progress Note     Date of Admission:  mg, 10 mg, Intravenous, Q6H PRN  •  nicotine (NICODERM CQ) 7 MG/24HR 1 patch, 1 patch, Transdermal, Daily  •  ondansetron HCl (ZOFRAN) injection 4 mg, 4 mg, Intravenous, Q6H PRN  •  hydrALAzine HCl (APRESOLINE) injection 10 mg, 10 mg, Intravenous, Q6H PRN 12/31/2019    RBC 4.84 12/31/2019    HGB 13.8 12/31/2019    HGB 13.8 12/31/2019    HCT 46.8 12/31/2019    HCT 46.8 12/31/2019    MCV 96.7 12/31/2019    MCV 96.7 12/31/2019    MCH 28.5 12/31/2019    MCH 28.5 12/31/2019    MCHC 29.5 12/31/2019    MCHC 29.5 1

## 2019-12-31 NOTE — PLAN OF CARE
Received pt this am alert, answers questions; knows her own name, thinks she is in a different town, was not aware she was in the hospital; at times answers questions appropriately, this afternoon, pt was aware of the surgery she had done, but was unaware

## 2019-12-31 NOTE — DIETARY NOTE
[facility]    NUTRITION ASSESSMENT    Pt does not meet malnutrition criteria. NUTRITION DIAGNOSIS/PROBLEM:    Inadequate oral intake related to decreased ability to consume sufficient energy as evidenced by poor po intakes per intake record.  - cont patient for diet advancement- no progress per note. D/C plans to subacute rehab. ANTHROPOMETRICS:  Ht: 180.3 cm (5' 10.98\")  Wt: 105.9 kg (233 lb 7.5 oz). This is 156% of IBW  BMI: Body mass index is 32.58 kg/m².   IBW:  68 kg    WEIGHT HISTORY:   Wt

## 2019-12-31 NOTE — RESPIRATORY THERAPY NOTE
SMITA Equipment Usage Summary :            Set Mode : CPAP W FLEX          Usage in Hours:9;08          90% Pressure (EPAP) : 7           90% Insp Pressure (IPAP);           AHI : 3.2          Supplemental Oxygen :  2    LPM

## 2019-12-31 NOTE — PROGRESS NOTES
[facility] Hospitalist Progress Note     Jil Chisholm Patient Status:  Inpatient    1955 MRN [id number]   [facility] 4SW-A Attending Ewelina Tate MD   [facility] Day # 15 PCP Paddy Mann MD     CC: follow up    SUBJECTIVE:  Herlinda Richards 118*  118* 129* 132* 132* 128*   CO2 25.0 27.0   < > 26.0 22.0   < > 25.0  25.0 25.0 24.0 25.0 26.0   * 88*   < > 67* 50*   < > 39*  39* 34* 35* 34* 28*   CREATSERUM 2.31* 2.09*   < > 1.81* 1.57*   < > 1.40*  1.40* 1.35* 1.48* 1.51* 1.46*   CA 9.2 9 spinal stenosis, urinary incontinence, chronic pain on fentanyl patch/oxy.  She was brought to the ED by family due to altered mental status.     # Bowel perforation  - s/p right hemicolectomy with ostomy by gen surg  - NGT to LIS for decompression - clampe minutes spent in d/w pt/family, coordination of care, and/or d/w staff.      Joss Means MD     [facility] IM Hospitalist  Pager: [phone]

## 2020-01-01 ENCOUNTER — NURSE ONLY (OUTPATIENT)
Dept: LAB | Age: 65
End: 2020-01-01
Attending: FAMILY MEDICINE
Payer: MEDICARE

## 2020-01-01 ENCOUNTER — APPOINTMENT (OUTPATIENT)
Dept: CT IMAGING | Facility: HOSPITAL | Age: 65
DRG: 208 | End: 2020-01-01
Attending: EMERGENCY MEDICINE
Payer: MEDICARE

## 2020-01-01 ENCOUNTER — MOBILE ENCOUNTER (OUTPATIENT)
Dept: FAMILY MEDICINE CLINIC | Facility: CLINIC | Age: 65
End: 2020-01-01

## 2020-01-01 ENCOUNTER — APPOINTMENT (OUTPATIENT)
Dept: CV DIAGNOSTICS | Facility: HOSPITAL | Age: 65
DRG: 208 | End: 2020-01-01
Attending: INTERNAL MEDICINE
Payer: MEDICARE

## 2020-01-01 ENCOUNTER — APPOINTMENT (OUTPATIENT)
Dept: GENERAL RADIOLOGY | Facility: HOSPITAL | Age: 65
DRG: 907 | End: 2020-01-01
Attending: INTERNAL MEDICINE
Payer: MEDICARE

## 2020-01-01 ENCOUNTER — APPOINTMENT (OUTPATIENT)
Dept: GENERAL RADIOLOGY | Facility: HOSPITAL | Age: 65
DRG: 208 | End: 2020-01-01
Attending: INTERNAL MEDICINE
Payer: MEDICARE

## 2020-01-01 ENCOUNTER — SNF VISIT (OUTPATIENT)
Dept: INTERNAL MEDICINE CLINIC | Age: 65
End: 2020-01-01

## 2020-01-01 ENCOUNTER — APPOINTMENT (OUTPATIENT)
Dept: GENERAL RADIOLOGY | Facility: HOSPITAL | Age: 65
DRG: 208 | End: 2020-01-01
Attending: EMERGENCY MEDICINE
Payer: MEDICARE

## 2020-01-01 ENCOUNTER — APPOINTMENT (OUTPATIENT)
Dept: MRI IMAGING | Facility: HOSPITAL | Age: 65
DRG: 907 | End: 2020-01-01
Attending: Other
Payer: MEDICARE

## 2020-01-01 ENCOUNTER — APPOINTMENT (OUTPATIENT)
Dept: ULTRASOUND IMAGING | Facility: HOSPITAL | Age: 65
DRG: 907 | End: 2020-01-01
Attending: Other
Payer: MEDICARE

## 2020-01-01 ENCOUNTER — ANESTHESIA (OUTPATIENT)
Dept: EMERGENCY DEPT | Facility: HOSPITAL | Age: 65
DRG: 208 | End: 2020-01-01
Payer: MEDICARE

## 2020-01-01 ENCOUNTER — ANESTHESIA EVENT (OUTPATIENT)
Dept: EMERGENCY DEPT | Facility: HOSPITAL | Age: 65
DRG: 208 | End: 2020-01-01
Payer: MEDICARE

## 2020-01-01 ENCOUNTER — APPOINTMENT (OUTPATIENT)
Dept: CT IMAGING | Facility: HOSPITAL | Age: 65
DRG: 907 | End: 2020-01-01
Attending: INTERNAL MEDICINE
Payer: MEDICARE

## 2020-01-01 ENCOUNTER — LAB REQUISITION (OUTPATIENT)
Dept: LAB | Facility: HOSPITAL | Age: 65
End: 2020-01-01
Payer: MEDICARE

## 2020-01-01 ENCOUNTER — INITIAL APN SNF VISIT (OUTPATIENT)
Dept: INTERNAL MEDICINE CLINIC | Age: 65
End: 2020-01-01

## 2020-01-01 ENCOUNTER — EXTERNAL FACILITY (OUTPATIENT)
Dept: FAMILY MEDICINE CLINIC | Facility: CLINIC | Age: 65
End: 2020-01-01

## 2020-01-01 ENCOUNTER — HOSPITAL ENCOUNTER (INPATIENT)
Facility: HOSPITAL | Age: 65
LOS: 5 days | Discharge: HOSPICE | DRG: 208 | End: 2020-01-01
Attending: EMERGENCY MEDICINE | Admitting: INTERNAL MEDICINE
Payer: MEDICARE

## 2020-01-01 ENCOUNTER — HOSPITAL ENCOUNTER (INPATIENT)
Facility: HOSPITAL | Age: 65
LOS: 1 days | DRG: 193 | End: 2020-01-01
Attending: HOSPITALIST | Admitting: HOSPITALIST
Payer: OTHER MISCELLANEOUS

## 2020-01-01 ENCOUNTER — SNF DISCHARGE (OUTPATIENT)
Dept: INTERNAL MEDICINE CLINIC | Age: 65
End: 2020-01-01

## 2020-01-01 VITALS
DIASTOLIC BLOOD PRESSURE: 69 MMHG | TEMPERATURE: 98 F | RESPIRATION RATE: 18 BRPM | BODY MASS INDEX: 31.54 KG/M2 | SYSTOLIC BLOOD PRESSURE: 127 MMHG | HEART RATE: 78 BPM | HEIGHT: 70.98 IN | OXYGEN SATURATION: 97 % | WEIGHT: 225.31 LBS

## 2020-01-01 VITALS
SYSTOLIC BLOOD PRESSURE: 124 MMHG | RESPIRATION RATE: 24 BRPM | DIASTOLIC BLOOD PRESSURE: 71 MMHG | TEMPERATURE: 98 F | WEIGHT: 237 LBS | OXYGEN SATURATION: 87 % | HEART RATE: 105 BPM | BODY MASS INDEX: 33.93 KG/M2 | HEIGHT: 70 IN

## 2020-01-01 VITALS
WEIGHT: 226.19 LBS | DIASTOLIC BLOOD PRESSURE: 70 MMHG | OXYGEN SATURATION: 94 % | SYSTOLIC BLOOD PRESSURE: 109 MMHG | BODY MASS INDEX: 32 KG/M2 | TEMPERATURE: 98 F | HEART RATE: 89 BPM | RESPIRATION RATE: 17 BRPM

## 2020-01-01 VITALS
BODY MASS INDEX: 32 KG/M2 | DIASTOLIC BLOOD PRESSURE: 89 MMHG | RESPIRATION RATE: 18 BRPM | WEIGHT: 230 LBS | SYSTOLIC BLOOD PRESSURE: 143 MMHG | TEMPERATURE: 97 F | HEART RATE: 96 BPM | OXYGEN SATURATION: 97 %

## 2020-01-01 VITALS
DIASTOLIC BLOOD PRESSURE: 57 MMHG | TEMPERATURE: 98 F | SYSTOLIC BLOOD PRESSURE: 87 MMHG | OXYGEN SATURATION: 59 % | HEART RATE: 81 BPM | RESPIRATION RATE: 9 BRPM

## 2020-01-01 VITALS
WEIGHT: 230 LBS | HEART RATE: 91 BPM | BODY MASS INDEX: 32 KG/M2 | RESPIRATION RATE: 20 BRPM | DIASTOLIC BLOOD PRESSURE: 80 MMHG | OXYGEN SATURATION: 94 % | SYSTOLIC BLOOD PRESSURE: 126 MMHG | TEMPERATURE: 98 F

## 2020-01-01 VITALS
DIASTOLIC BLOOD PRESSURE: 59 MMHG | RESPIRATION RATE: 20 BRPM | SYSTOLIC BLOOD PRESSURE: 134 MMHG | TEMPERATURE: 99 F | OXYGEN SATURATION: 93 % | HEART RATE: 76 BPM

## 2020-01-01 DIAGNOSIS — I89.0 LYMPHEDEMA: ICD-10-CM

## 2020-01-01 DIAGNOSIS — Z90.49 HISTORY OF HEMICOLECTOMY: ICD-10-CM

## 2020-01-01 DIAGNOSIS — R35.0 URINE FREQUENCY: ICD-10-CM

## 2020-01-01 DIAGNOSIS — R53.1 WEAKNESS GENERALIZED: ICD-10-CM

## 2020-01-01 DIAGNOSIS — F31.9 BIPOLAR 1 DISORDER (HCC): ICD-10-CM

## 2020-01-01 DIAGNOSIS — I82.811 OCCLUSION OF RIGHT SAPHENOUS VEIN: Primary | ICD-10-CM

## 2020-01-01 DIAGNOSIS — Z98.890 HISTORY OF ILEOSTOMY: ICD-10-CM

## 2020-01-01 DIAGNOSIS — R53.1 WEAKNESS: ICD-10-CM

## 2020-01-01 DIAGNOSIS — J44.9 CHRONIC OBSTRUCTIVE PULMONARY DISEASE, UNSPECIFIED COPD TYPE (HCC): ICD-10-CM

## 2020-01-01 DIAGNOSIS — N10 ACUTE PYELONEPHRITIS: ICD-10-CM

## 2020-01-01 DIAGNOSIS — Z48.815 ENCOUNTER FOR SURGICAL AFTERCARE FOLLOWING SURGERY OF DIGESTIVE SYSTEM: Primary | ICD-10-CM

## 2020-01-01 DIAGNOSIS — I10 ESSENTIAL HYPERTENSION: ICD-10-CM

## 2020-01-01 DIAGNOSIS — G47.33 OSA ON CPAP: ICD-10-CM

## 2020-01-01 DIAGNOSIS — Z90.49 STATUS POST RIGHT HEMICOLECTOMY: ICD-10-CM

## 2020-01-01 DIAGNOSIS — K59.01 SLOW TRANSIT CONSTIPATION: ICD-10-CM

## 2020-01-01 DIAGNOSIS — Z90.49 ACQUIRED ABSENCE OF OTHER SPECIFIED PARTS OF DIGESTIVE TRACT: ICD-10-CM

## 2020-01-01 DIAGNOSIS — G89.4 CHRONIC PAIN SYNDROME: ICD-10-CM

## 2020-01-01 DIAGNOSIS — I48.91 ATRIAL FIBRILLATION (HCC): Primary | ICD-10-CM

## 2020-01-01 DIAGNOSIS — D68.59 PROTEIN S DEFICIENCY (HCC): ICD-10-CM

## 2020-01-01 DIAGNOSIS — I25.10 CORONARY ARTERY DISEASE INVOLVING NATIVE CORONARY ARTERY OF NATIVE HEART WITHOUT ANGINA PECTORIS: ICD-10-CM

## 2020-01-01 DIAGNOSIS — Z87.891 PERSONAL HISTORY OF TOBACCO USE, PRESENTING HAZARDS TO HEALTH: ICD-10-CM

## 2020-01-01 DIAGNOSIS — G47.33 OSA TREATED WITH BIPAP: ICD-10-CM

## 2020-01-01 DIAGNOSIS — N10 ACUTE PYELONEPHRITIS: Primary | ICD-10-CM

## 2020-01-01 DIAGNOSIS — E50.9 AVITAMINOSIS A: ICD-10-CM

## 2020-01-01 DIAGNOSIS — T56.891A: Primary | ICD-10-CM

## 2020-01-01 DIAGNOSIS — E56.9 VITAMIN DISEASE: Primary | ICD-10-CM

## 2020-01-01 DIAGNOSIS — T56.891S LITHIUM TOXICITY, ACCIDENTAL OR UNINTENTIONAL, SEQUELA: Primary | ICD-10-CM

## 2020-01-01 DIAGNOSIS — Z99.89 OSA ON CPAP: ICD-10-CM

## 2020-01-01 DIAGNOSIS — J44.9 CHRONIC OBSTRUCTIVE PULMONARY DISEASE, UNSPECIFIED (HCC): ICD-10-CM

## 2020-01-01 DIAGNOSIS — Z98.890 HISTORY OF ILEOSTOMY: Primary | ICD-10-CM

## 2020-01-01 DIAGNOSIS — I10 ESSENTIAL (PRIMARY) HYPERTENSION: ICD-10-CM

## 2020-01-01 DIAGNOSIS — J96.91 RESPIRATORY FAILURE WITH HYPOXIA, UNSPECIFIED CHRONICITY (HCC): Primary | ICD-10-CM

## 2020-01-01 DIAGNOSIS — Z48.815 ENCOUNTER FOR SURGICAL AFTERCARE FOLLOWING SURGERY ON THE DIGESTIVE SYSTEM: ICD-10-CM

## 2020-01-01 DIAGNOSIS — Z48.815 ENCOUNTER FOR SURGICAL AFTERCARE FOLLOWING SURGERY ON THE DIGESTIVE SYSTEM: Primary | ICD-10-CM

## 2020-01-01 DIAGNOSIS — N39.0 URINARY TRACT INFECTION, SITE NOT SPECIFIED: ICD-10-CM

## 2020-01-01 DIAGNOSIS — R35.0 URINE FREQUENCY: Primary | ICD-10-CM

## 2020-01-01 DIAGNOSIS — K63.1 BOWEL PERFORATION (HCC): Primary | ICD-10-CM

## 2020-01-01 DIAGNOSIS — I87.2 PERIPHERAL VENOUS INSUFFICIENCY: ICD-10-CM

## 2020-01-01 LAB
ALBUMIN SERPL-MCNC: 2 G/DL (ref 3.4–5)
ALBUMIN SERPL-MCNC: 2.1 G/DL (ref 3.4–5)
ALBUMIN SERPL-MCNC: 2.2 G/DL (ref 3.4–5)
ALBUMIN SERPL-MCNC: 2.2 G/DL (ref 3.4–5)
ALBUMIN SERPL-MCNC: 2.6 G/DL (ref 3.4–5)
ALBUMIN SERPL-MCNC: 2.8 G/DL (ref 3.4–5)
ALBUMIN SERPL-MCNC: 2.8 G/DL (ref 3.4–5)
ALBUMIN SERPL-MCNC: 3.1 G/DL (ref 3.4–5)
ALBUMIN/GLOB SERPL: 0.7 {RATIO} (ref 1–2)
ALBUMIN/GLOB SERPL: 0.8 {RATIO} (ref 1–2)
ALP LIVER SERPL-CCNC: 153 U/L (ref 55–142)
ALP LIVER SERPL-CCNC: 158 U/L (ref 55–142)
ALP LIVER SERPL-CCNC: 181 U/L (ref 55–142)
ALP LIVER SERPL-CCNC: 184 U/L (ref 55–142)
ALT SERPL-CCNC: 20 U/L (ref 13–56)
ALT SERPL-CCNC: 25 U/L (ref 13–56)
ALT SERPL-CCNC: 25 U/L (ref 13–56)
ALT SERPL-CCNC: 26 U/L (ref 13–56)
ANION GAP SERPL CALC-SCNC: 4 MMOL/L (ref 0–18)
ANION GAP SERPL CALC-SCNC: 4 MMOL/L (ref 0–18)
ANION GAP SERPL CALC-SCNC: 5 MMOL/L (ref 0–18)
ANION GAP SERPL CALC-SCNC: 5 MMOL/L (ref 0–18)
ANION GAP SERPL CALC-SCNC: 6 MMOL/L (ref 0–18)
ANION GAP SERPL CALC-SCNC: 7 MMOL/L (ref 0–18)
ANION GAP SERPL CALC-SCNC: 8 MMOL/L (ref 0–18)
ANION GAP SERPL CALC-SCNC: 9 MMOL/L (ref 0–18)
AST SERPL-CCNC: 13 U/L (ref 15–37)
AST SERPL-CCNC: 17 U/L (ref 15–37)
ATRIAL RATE: 87 BPM
ATRIAL RATE: 89 BPM
BASOPHILS # BLD AUTO: 0.03 X10(3) UL (ref 0–0.2)
BASOPHILS # BLD AUTO: 0.04 X10(3) UL (ref 0–0.2)
BASOPHILS # BLD AUTO: 0.05 X10(3) UL (ref 0–0.2)
BASOPHILS # BLD AUTO: 0.05 X10(3) UL (ref 0–0.2)
BASOPHILS # BLD AUTO: 0.06 X10(3) UL (ref 0–0.2)
BASOPHILS NFR BLD AUTO: 0.3 %
BASOPHILS NFR BLD AUTO: 0.3 %
BASOPHILS NFR BLD AUTO: 0.4 %
BASOPHILS NFR BLD AUTO: 0.5 %
BASOPHILS NFR BLD AUTO: 0.5 %
BASOPHILS NFR BLD AUTO: 0.6 %
BASOPHILS NFR BLD AUTO: 0.8 %
BILIRUB SERPL-MCNC: 0.2 MG/DL (ref 0.1–2)
BILIRUB SERPL-MCNC: 0.2 MG/DL (ref 0.1–2)
BILIRUB SERPL-MCNC: 0.3 MG/DL (ref 0.1–2)
BILIRUB SERPL-MCNC: 0.3 MG/DL (ref 0.1–2)
BILIRUB UR QL STRIP.AUTO: NEGATIVE
BILIRUB UR QL STRIP.AUTO: NEGATIVE
BUN BLD-MCNC: 10 MG/DL (ref 7–18)
BUN BLD-MCNC: 11 MG/DL (ref 7–18)
BUN BLD-MCNC: 11 MG/DL (ref 7–18)
BUN BLD-MCNC: 15 MG/DL (ref 7–18)
BUN BLD-MCNC: 15 MG/DL (ref 7–18)
BUN BLD-MCNC: 16 MG/DL (ref 7–18)
BUN BLD-MCNC: 16 MG/DL (ref 7–18)
BUN BLD-MCNC: 17 MG/DL (ref 7–18)
BUN BLD-MCNC: 17 MG/DL (ref 7–18)
BUN BLD-MCNC: 18 MG/DL (ref 7–18)
BUN BLD-MCNC: 20 MG/DL (ref 7–18)
BUN BLD-MCNC: 20 MG/DL (ref 7–18)
BUN BLD-MCNC: 21 MG/DL (ref 7–18)
BUN BLD-MCNC: 25 MG/DL (ref 7–18)
BUN/CREAT SERPL: 10.3 (ref 10–20)
BUN/CREAT SERPL: 10.6 (ref 10–20)
BUN/CREAT SERPL: 11.1 (ref 10–20)
BUN/CREAT SERPL: 11.5 (ref 10–20)
BUN/CREAT SERPL: 12.9 (ref 10–20)
BUN/CREAT SERPL: 13.6 (ref 10–20)
BUN/CREAT SERPL: 13.7 (ref 10–20)
BUN/CREAT SERPL: 14 (ref 10–20)
BUN/CREAT SERPL: 14.8 (ref 10–20)
BUN/CREAT SERPL: 15.5 (ref 10–20)
BUN/CREAT SERPL: 16.2 (ref 10–20)
BUN/CREAT SERPL: 17.1 (ref 10–20)
BUN/CREAT SERPL: 17.9 (ref 10–20)
BUN/CREAT SERPL: 18.9 (ref 10–20)
BUN/CREAT SERPL: 19.2 (ref 10–20)
BUN/CREAT SERPL: 9.9 (ref 10–20)
CALCIUM BLD-MCNC: 10.1 MG/DL (ref 8.5–10.1)
CALCIUM BLD-MCNC: 10.3 MG/DL (ref 8.5–10.1)
CALCIUM BLD-MCNC: 8.7 MG/DL (ref 8.5–10.1)
CALCIUM BLD-MCNC: 8.8 MG/DL (ref 8.5–10.1)
CALCIUM BLD-MCNC: 8.9 MG/DL (ref 8.5–10.1)
CALCIUM BLD-MCNC: 9 MG/DL (ref 8.5–10.1)
CALCIUM BLD-MCNC: 9.1 MG/DL (ref 8.5–10.1)
CALCIUM BLD-MCNC: 9.2 MG/DL (ref 8.5–10.1)
CALCIUM BLD-MCNC: 9.4 MG/DL (ref 8.5–10.1)
CALCIUM BLD-MCNC: 9.7 MG/DL (ref 8.5–10.1)
CALCIUM BLD-MCNC: 9.8 MG/DL (ref 8.5–10.1)
CALCIUM BLD-MCNC: 9.9 MG/DL (ref 8.5–10.1)
CHLORIDE SERPL-SCNC: 110 MMOL/L (ref 98–112)
CHLORIDE SERPL-SCNC: 114 MMOL/L (ref 98–112)
CHLORIDE SERPL-SCNC: 115 MMOL/L (ref 98–112)
CHLORIDE SERPL-SCNC: 116 MMOL/L (ref 98–112)
CHLORIDE SERPL-SCNC: 117 MMOL/L (ref 98–112)
CHLORIDE SERPL-SCNC: 118 MMOL/L (ref 98–112)
CHLORIDE SERPL-SCNC: 121 MMOL/L (ref 98–112)
CHLORIDE SERPL-SCNC: 125 MMOL/L (ref 98–112)
CLARITY UR REFRACT.AUTO: CLEAR
CO2 SERPL-SCNC: 18 MMOL/L (ref 21–32)
CO2 SERPL-SCNC: 19 MMOL/L (ref 21–32)
CO2 SERPL-SCNC: 20 MMOL/L (ref 21–32)
CO2 SERPL-SCNC: 21 MMOL/L (ref 21–32)
CO2 SERPL-SCNC: 23 MMOL/L (ref 21–32)
CO2 SERPL-SCNC: 25 MMOL/L (ref 21–32)
COLOR UR AUTO: YELLOW
CREAT BLD-MCNC: 0.9 MG/DL (ref 0.55–1.02)
CREAT BLD-MCNC: 0.95 MG/DL (ref 0.55–1.02)
CREAT BLD-MCNC: 0.96 MG/DL (ref 0.55–1.02)
CREAT BLD-MCNC: 0.97 MG/DL (ref 0.55–1.02)
CREAT BLD-MCNC: 0.97 MG/DL (ref 0.55–1.02)
CREAT BLD-MCNC: 0.99 MG/DL (ref 0.55–1.02)
CREAT BLD-MCNC: 1.01 MG/DL (ref 0.55–1.02)
CREAT BLD-MCNC: 1.04 MG/DL (ref 0.55–1.02)
CREAT BLD-MCNC: 1.08 MG/DL (ref 0.55–1.02)
CREAT BLD-MCNC: 1.1 MG/DL (ref 0.55–1.02)
CREAT BLD-MCNC: 1.12 MG/DL (ref 0.55–1.02)
CREAT BLD-MCNC: 1.17 MG/DL (ref 0.55–1.02)
CREAT BLD-MCNC: 1.24 MG/DL (ref 0.55–1.02)
CREAT BLD-MCNC: 1.3 MG/DL (ref 0.55–1.02)
CREAT BLD-MCNC: 1.32 MG/DL (ref 0.55–1.02)
CREAT BLD-MCNC: 1.5 MG/DL (ref 0.55–1.02)
DEPRECATED RDW RBC AUTO: 43.3 FL (ref 35.1–46.3)
DEPRECATED RDW RBC AUTO: 43.7 FL (ref 35.1–46.3)
DEPRECATED RDW RBC AUTO: 43.9 FL (ref 35.1–46.3)
DEPRECATED RDW RBC AUTO: 44.1 FL (ref 35.1–46.3)
DEPRECATED RDW RBC AUTO: 44.4 FL (ref 35.1–46.3)
DEPRECATED RDW RBC AUTO: 44.4 FL (ref 35.1–46.3)
DEPRECATED RDW RBC AUTO: 44.5 FL (ref 35.1–46.3)
DEPRECATED RDW RBC AUTO: 45.2 FL (ref 35.1–46.3)
DEPRECATED RDW RBC AUTO: 45.2 FL (ref 35.1–46.3)
DEPRECATED RDW RBC AUTO: 46 FL (ref 35.1–46.3)
DEPRECATED RDW RBC AUTO: 46.4 FL (ref 35.1–46.3)
DEPRECATED RDW RBC AUTO: 46.9 FL (ref 35.1–46.3)
DEPRECATED RDW RBC AUTO: 46.9 FL (ref 35.1–46.3)
DEPRECATED RDW RBC AUTO: 47.1 FL (ref 35.1–46.3)
DEPRECATED RDW RBC AUTO: 48 FL (ref 35.1–46.3)
DEPRECATED RDW RBC AUTO: 48.7 FL (ref 35.1–46.3)
EOSINOPHIL # BLD AUTO: 0.2 X10(3) UL (ref 0–0.7)
EOSINOPHIL # BLD AUTO: 0.26 X10(3) UL (ref 0–0.7)
EOSINOPHIL # BLD AUTO: 0.27 X10(3) UL (ref 0–0.7)
EOSINOPHIL # BLD AUTO: 0.29 X10(3) UL (ref 0–0.7)
EOSINOPHIL # BLD AUTO: 0.33 X10(3) UL (ref 0–0.7)
EOSINOPHIL # BLD AUTO: 0.34 X10(3) UL (ref 0–0.7)
EOSINOPHIL # BLD AUTO: 0.34 X10(3) UL (ref 0–0.7)
EOSINOPHIL NFR BLD AUTO: 2.6 %
EOSINOPHIL NFR BLD AUTO: 2.7 %
EOSINOPHIL NFR BLD AUTO: 2.8 %
EOSINOPHIL NFR BLD AUTO: 3 %
EOSINOPHIL NFR BLD AUTO: 3.4 %
EOSINOPHIL NFR BLD AUTO: 4.2 %
EOSINOPHIL NFR BLD AUTO: 4.8 %
ERYTHROCYTE [DISTWIDTH] IN BLOOD BY AUTOMATED COUNT: 12.9 % (ref 11–15)
ERYTHROCYTE [DISTWIDTH] IN BLOOD BY AUTOMATED COUNT: 13.1 % (ref 11–15)
ERYTHROCYTE [DISTWIDTH] IN BLOOD BY AUTOMATED COUNT: 13.2 % (ref 11–15)
ERYTHROCYTE [DISTWIDTH] IN BLOOD BY AUTOMATED COUNT: 13.4 % (ref 11–15)
ERYTHROCYTE [DISTWIDTH] IN BLOOD BY AUTOMATED COUNT: 13.7 % (ref 11–15)
ERYTHROCYTE [DISTWIDTH] IN BLOOD BY AUTOMATED COUNT: 13.8 % (ref 11–15)
ERYTHROCYTE [DISTWIDTH] IN BLOOD BY AUTOMATED COUNT: 13.8 % (ref 11–15)
ERYTHROCYTE [DISTWIDTH] IN BLOOD BY AUTOMATED COUNT: 14 % (ref 11–15)
GLOBULIN PLAS-MCNC: 3.7 G/DL (ref 2.8–4.4)
GLOBULIN PLAS-MCNC: 3.8 G/DL (ref 2.8–4.4)
GLOBULIN PLAS-MCNC: 3.9 G/DL (ref 2.8–4.4)
GLOBULIN PLAS-MCNC: 4 G/DL (ref 2.8–4.4)
GLUCOSE BLD-MCNC: 101 MG/DL (ref 70–99)
GLUCOSE BLD-MCNC: 102 MG/DL (ref 70–99)
GLUCOSE BLD-MCNC: 105 MG/DL (ref 70–99)
GLUCOSE BLD-MCNC: 106 MG/DL (ref 70–99)
GLUCOSE BLD-MCNC: 106 MG/DL (ref 70–99)
GLUCOSE BLD-MCNC: 107 MG/DL (ref 70–99)
GLUCOSE BLD-MCNC: 111 MG/DL (ref 70–99)
GLUCOSE BLD-MCNC: 113 MG/DL (ref 70–99)
GLUCOSE BLD-MCNC: 116 MG/DL (ref 70–99)
GLUCOSE BLD-MCNC: 117 MG/DL (ref 70–99)
GLUCOSE BLD-MCNC: 117 MG/DL (ref 70–99)
GLUCOSE BLD-MCNC: 118 MG/DL (ref 70–99)
GLUCOSE BLD-MCNC: 121 MG/DL (ref 70–99)
GLUCOSE BLD-MCNC: 143 MG/DL (ref 70–99)
GLUCOSE BLD-MCNC: 151 MG/DL (ref 70–99)
GLUCOSE BLD-MCNC: 95 MG/DL (ref 70–99)
GLUCOSE UR STRIP.AUTO-MCNC: NEGATIVE MG/DL
GLUCOSE UR STRIP.AUTO-MCNC: NEGATIVE MG/DL
HAV IGM SER QL: 1.5 MG/DL (ref 1.6–2.6)
HAV IGM SER QL: 1.7 MG/DL (ref 1.6–2.6)
HAV IGM SER QL: 1.8 MG/DL (ref 1.6–2.6)
HAV IGM SER QL: 2.1 MG/DL (ref 1.6–2.6)
HAV IGM SER QL: 2.1 MG/DL (ref 1.6–2.6)
HCT VFR BLD AUTO: 38 % (ref 35–48)
HCT VFR BLD AUTO: 39.5 % (ref 35–48)
HCT VFR BLD AUTO: 39.6 % (ref 35–48)
HCT VFR BLD AUTO: 39.6 % (ref 35–48)
HCT VFR BLD AUTO: 39.7 % (ref 35–48)
HCT VFR BLD AUTO: 39.7 % (ref 35–48)
HCT VFR BLD AUTO: 39.9 % (ref 35–48)
HCT VFR BLD AUTO: 40.2 % (ref 35–48)
HCT VFR BLD AUTO: 40.4 % (ref 35–48)
HCT VFR BLD AUTO: 40.7 % (ref 35–48)
HCT VFR BLD AUTO: 40.8 % (ref 35–48)
HCT VFR BLD AUTO: 41.2 % (ref 35–48)
HCT VFR BLD AUTO: 42.1 % (ref 35–48)
HCT VFR BLD AUTO: 42.4 % (ref 35–48)
HCT VFR BLD AUTO: 43.6 % (ref 35–48)
HCT VFR BLD AUTO: 45.2 % (ref 35–48)
HGB BLD-MCNC: 12 G/DL (ref 12–16)
HGB BLD-MCNC: 12.3 G/DL (ref 12–16)
HGB BLD-MCNC: 12.5 G/DL (ref 12–16)
HGB BLD-MCNC: 12.5 G/DL (ref 12–16)
HGB BLD-MCNC: 12.6 G/DL (ref 12–16)
HGB BLD-MCNC: 12.6 G/DL (ref 12–16)
HGB BLD-MCNC: 12.7 G/DL (ref 12–16)
HGB BLD-MCNC: 12.7 G/DL (ref 12–16)
HGB BLD-MCNC: 12.8 G/DL (ref 12–16)
HGB BLD-MCNC: 12.9 G/DL (ref 12–16)
HGB BLD-MCNC: 12.9 G/DL (ref 12–16)
HGB BLD-MCNC: 13.1 G/DL (ref 12–16)
HGB BLD-MCNC: 13.3 G/DL (ref 12–16)
HGB BLD-MCNC: 13.9 G/DL (ref 12–16)
IMM GRANULOCYTES # BLD AUTO: 0.04 X10(3) UL (ref 0–1)
IMM GRANULOCYTES # BLD AUTO: 0.07 X10(3) UL (ref 0–1)
IMM GRANULOCYTES # BLD AUTO: 0.07 X10(3) UL (ref 0–1)
IMM GRANULOCYTES # BLD AUTO: 0.09 X10(3) UL (ref 0–1)
IMM GRANULOCYTES # BLD AUTO: 0.13 X10(3) UL (ref 0–1)
IMM GRANULOCYTES # BLD AUTO: 0.17 X10(3) UL (ref 0–1)
IMM GRANULOCYTES # BLD AUTO: 0.25 X10(3) UL (ref 0–1)
IMM GRANULOCYTES NFR BLD: 0.5 %
IMM GRANULOCYTES NFR BLD: 0.7 %
IMM GRANULOCYTES NFR BLD: 0.9 %
IMM GRANULOCYTES NFR BLD: 1 %
IMM GRANULOCYTES NFR BLD: 1.3 %
IMM GRANULOCYTES NFR BLD: 1.7 %
IMM GRANULOCYTES NFR BLD: 3.4 %
INR BLD: 1.24 (ref 0.9–1.1)
INR BLD: 1.3 (ref 0.9–1.1)
INR BLD: 1.35 (ref 0.9–1.1)
INR BLD: 1.51 (ref 0.9–1.1)
INR BLD: 1.59 (ref 0.9–1.1)
INR BLD: 1.64 (ref 0.9–1.1)
INR BLD: 1.64 (ref 0.9–1.1)
INR BLD: 1.65 (ref 0.9–1.1)
INR BLD: 1.68 (ref 0.9–1.1)
INR BLD: 1.78 (ref 0.9–1.1)
INR BLD: 1.82 (ref 0.9–1.1)
INR BLD: 2.06 (ref 0.9–1.1)
INR BLD: 2.17 (ref 0.9–1.1)
INR BLD: 2.17 (ref 0.9–1.1)
INR BLD: 2.51 (ref 0.9–1.1)
INR BLD: 2.82 (ref 0.9–1.1)
INR BLD: 3.28 (ref 0.9–1.1)
INR BLD: 3.28 (ref 0.9–1.1)
INR BLD: 4.08 (ref 0.9–1.1)
INR BLD: 4.18 (ref 0.9–1.1)
KETONES UR STRIP.AUTO-MCNC: NEGATIVE MG/DL
KETONES UR STRIP.AUTO-MCNC: NEGATIVE MG/DL
LEUKOCYTE ESTERASE UR QL STRIP.AUTO: NEGATIVE
LITHIUM SERPL-SCNC: 0.3 MMOL/L (ref 0.6–1.2)
LYMPHOCYTES # BLD AUTO: 1.01 X10(3) UL (ref 1–4)
LYMPHOCYTES # BLD AUTO: 1.02 X10(3) UL (ref 1–4)
LYMPHOCYTES # BLD AUTO: 1.12 X10(3) UL (ref 1–4)
LYMPHOCYTES # BLD AUTO: 1.25 X10(3) UL (ref 1–4)
LYMPHOCYTES # BLD AUTO: 1.25 X10(3) UL (ref 1–4)
LYMPHOCYTES # BLD AUTO: 1.38 X10(3) UL (ref 1–4)
LYMPHOCYTES # BLD AUTO: 1.46 X10(3) UL (ref 1–4)
LYMPHOCYTES NFR BLD AUTO: 11.3 %
LYMPHOCYTES NFR BLD AUTO: 12.5 %
LYMPHOCYTES NFR BLD AUTO: 12.7 %
LYMPHOCYTES NFR BLD AUTO: 13 %
LYMPHOCYTES NFR BLD AUTO: 14.2 %
LYMPHOCYTES NFR BLD AUTO: 14.7 %
LYMPHOCYTES NFR BLD AUTO: 19.7 %
M PROTEIN MFR SERPL ELPH: 6.4 G/DL (ref 6.4–8.2)
M PROTEIN MFR SERPL ELPH: 6.7 G/DL (ref 6.4–8.2)
M PROTEIN MFR SERPL ELPH: 6.8 G/DL (ref 6.4–8.2)
M PROTEIN MFR SERPL ELPH: 6.8 G/DL (ref 6.4–8.2)
MCH RBC QN AUTO: 28.4 PG (ref 26–34)
MCH RBC QN AUTO: 28.5 PG (ref 26–34)
MCH RBC QN AUTO: 28.6 PG (ref 26–34)
MCH RBC QN AUTO: 28.7 PG (ref 26–34)
MCH RBC QN AUTO: 28.8 PG (ref 26–34)
MCH RBC QN AUTO: 28.8 PG (ref 26–34)
MCH RBC QN AUTO: 28.9 PG (ref 26–34)
MCH RBC QN AUTO: 28.9 PG (ref 26–34)
MCH RBC QN AUTO: 29 PG (ref 26–34)
MCH RBC QN AUTO: 29.1 PG (ref 26–34)
MCH RBC QN AUTO: 29.1 PG (ref 26–34)
MCH RBC QN AUTO: 29.3 PG (ref 26–34)
MCH RBC QN AUTO: 29.6 PG (ref 26–34)
MCH RBC QN AUTO: 29.6 PG (ref 26–34)
MCHC RBC AUTO-ENTMCNC: 29.6 G/DL (ref 31–37)
MCHC RBC AUTO-ENTMCNC: 30.8 G/DL (ref 31–37)
MCHC RBC AUTO-ENTMCNC: 30.8 G/DL (ref 31–37)
MCHC RBC AUTO-ENTMCNC: 30.9 G/DL (ref 31–37)
MCHC RBC AUTO-ENTMCNC: 30.9 G/DL (ref 31–37)
MCHC RBC AUTO-ENTMCNC: 31.1 G/DL (ref 31–37)
MCHC RBC AUTO-ENTMCNC: 31.1 G/DL (ref 31–37)
MCHC RBC AUTO-ENTMCNC: 31.4 G/DL (ref 31–37)
MCHC RBC AUTO-ENTMCNC: 31.4 G/DL (ref 31–37)
MCHC RBC AUTO-ENTMCNC: 31.6 G/DL (ref 31–37)
MCHC RBC AUTO-ENTMCNC: 31.6 G/DL (ref 31–37)
MCHC RBC AUTO-ENTMCNC: 31.8 G/DL (ref 31–37)
MCHC RBC AUTO-ENTMCNC: 32 G/DL (ref 31–37)
MCHC RBC AUTO-ENTMCNC: 32 G/DL (ref 31–37)
MCHC RBC AUTO-ENTMCNC: 32.1 G/DL (ref 31–37)
MCHC RBC AUTO-ENTMCNC: 32.4 G/DL (ref 31–37)
MCV RBC AUTO: 91 FL (ref 80–100)
MCV RBC AUTO: 91.1 FL (ref 80–100)
MCV RBC AUTO: 91.2 FL (ref 80–100)
MCV RBC AUTO: 91.4 FL (ref 80–100)
MCV RBC AUTO: 91.5 FL (ref 80–100)
MCV RBC AUTO: 91.5 FL (ref 80–100)
MCV RBC AUTO: 91.8 FL (ref 80–100)
MCV RBC AUTO: 92.4 FL (ref 80–100)
MCV RBC AUTO: 93 FL (ref 80–100)
MCV RBC AUTO: 93.2 FL (ref 80–100)
MCV RBC AUTO: 94 FL (ref 80–100)
MCV RBC AUTO: 94 FL (ref 80–100)
MCV RBC AUTO: 94.1 FL (ref 80–100)
MCV RBC AUTO: 96 FL (ref 80–100)
MONOCYTES # BLD AUTO: 0.39 X10(3) UL (ref 0.1–1)
MONOCYTES # BLD AUTO: 0.44 X10(3) UL (ref 0.1–1)
MONOCYTES # BLD AUTO: 0.46 X10(3) UL (ref 0.1–1)
MONOCYTES # BLD AUTO: 0.48 X10(3) UL (ref 0.1–1)
MONOCYTES # BLD AUTO: 0.49 X10(3) UL (ref 0.1–1)
MONOCYTES # BLD AUTO: 0.52 X10(3) UL (ref 0.1–1)
MONOCYTES # BLD AUTO: 0.6 X10(3) UL (ref 0.1–1)
MONOCYTES NFR BLD AUTO: 3.9 %
MONOCYTES NFR BLD AUTO: 4.9 %
MONOCYTES NFR BLD AUTO: 5 %
MONOCYTES NFR BLD AUTO: 5.4 %
MONOCYTES NFR BLD AUTO: 5.6 %
MONOCYTES NFR BLD AUTO: 6.4 %
MONOCYTES NFR BLD AUTO: 8.1 %
NEUTROPHILS # BLD AUTO: 4.85 X10 (3) UL (ref 1.5–7.7)
NEUTROPHILS # BLD AUTO: 4.85 X10(3) UL (ref 1.5–7.7)
NEUTROPHILS # BLD AUTO: 5 X10 (3) UL (ref 1.5–7.7)
NEUTROPHILS # BLD AUTO: 5 X10(3) UL (ref 1.5–7.7)
NEUTROPHILS # BLD AUTO: 6.27 X10 (3) UL (ref 1.5–7.7)
NEUTROPHILS # BLD AUTO: 6.27 X10(3) UL (ref 1.5–7.7)
NEUTROPHILS # BLD AUTO: 7.42 X10 (3) UL (ref 1.5–7.7)
NEUTROPHILS # BLD AUTO: 7.42 X10(3) UL (ref 1.5–7.7)
NEUTROPHILS # BLD AUTO: 7.46 X10 (3) UL (ref 1.5–7.7)
NEUTROPHILS # BLD AUTO: 7.46 X10(3) UL (ref 1.5–7.7)
NEUTROPHILS # BLD AUTO: 7.65 X10 (3) UL (ref 1.5–7.7)
NEUTROPHILS # BLD AUTO: 7.65 X10(3) UL (ref 1.5–7.7)
NEUTROPHILS # BLD AUTO: 7.96 X10 (3) UL (ref 1.5–7.7)
NEUTROPHILS # BLD AUTO: 7.96 X10(3) UL (ref 1.5–7.7)
NEUTROPHILS NFR BLD AUTO: 65.3 %
NEUTROPHILS NFR BLD AUTO: 72.5 %
NEUTROPHILS NFR BLD AUTO: 76.2 %
NEUTROPHILS NFR BLD AUTO: 76.8 %
NEUTROPHILS NFR BLD AUTO: 77.4 %
NEUTROPHILS NFR BLD AUTO: 77.6 %
NEUTROPHILS NFR BLD AUTO: 80.4 %
NITRITE UR QL STRIP.AUTO: NEGATIVE
NITRITE UR QL STRIP.AUTO: NEGATIVE
OSMOLALITY SERPL CALC.SUM OF ELEC: 287 MOSM/KG (ref 275–295)
OSMOLALITY SERPL CALC.SUM OF ELEC: 288 MOSM/KG (ref 275–295)
OSMOLALITY SERPL CALC.SUM OF ELEC: 291 MOSM/KG (ref 275–295)
OSMOLALITY SERPL CALC.SUM OF ELEC: 294 MOSM/KG (ref 275–295)
OSMOLALITY SERPL CALC.SUM OF ELEC: 296 MOSM/KG (ref 275–295)
OSMOLALITY SERPL CALC.SUM OF ELEC: 297 MOSM/KG (ref 275–295)
OSMOLALITY SERPL CALC.SUM OF ELEC: 298 MOSM/KG (ref 275–295)
OSMOLALITY SERPL CALC.SUM OF ELEC: 298 MOSM/KG (ref 275–295)
OSMOLALITY SERPL CALC.SUM OF ELEC: 300 MOSM/KG (ref 275–295)
OSMOLALITY SERPL CALC.SUM OF ELEC: 303 MOSM/KG (ref 275–295)
OSMOLALITY SERPL CALC.SUM OF ELEC: 311 MOSM/KG (ref 275–295)
OSMOLALITY SERPL CALC.SUM OF ELEC: 325 MOSM/KG (ref 275–295)
P AXIS: 43 DEGREES
P AXIS: 48 DEGREES
P-R INTERVAL: 186 MS
P-R INTERVAL: 204 MS
PATIENT FASTING Y/N/NP: YES
PH UR STRIP.AUTO: 6 [PH] (ref 4.5–8)
PH UR STRIP.AUTO: 6 [PH] (ref 4.5–8)
PHOSPHATE SERPL-MCNC: 2.6 MG/DL (ref 2.5–4.9)
PHOSPHATE SERPL-MCNC: 2.9 MG/DL (ref 2.5–4.9)
PHOSPHATE SERPL-MCNC: 3 MG/DL (ref 2.5–4.9)
PHOSPHATE SERPL-MCNC: 3.2 MG/DL (ref 2.5–4.9)
PHOSPHATE SERPL-MCNC: 3.3 MG/DL (ref 2.5–4.9)
PHOSPHATE SERPL-MCNC: 3.3 MG/DL (ref 2.5–4.9)
PHOSPHATE SERPL-MCNC: 3.4 MG/DL (ref 2.5–4.9)
PLATELET # BLD AUTO: 118 10(3)UL (ref 150–450)
PLATELET # BLD AUTO: 126 10(3)UL (ref 150–450)
PLATELET # BLD AUTO: 131 10(3)UL (ref 150–450)
PLATELET # BLD AUTO: 135 10(3)UL (ref 150–450)
PLATELET # BLD AUTO: 149 10(3)UL (ref 150–450)
PLATELET # BLD AUTO: 151 10(3)UL (ref 150–450)
PLATELET # BLD AUTO: 156 10(3)UL (ref 150–450)
PLATELET # BLD AUTO: 166 10(3)UL (ref 150–450)
PLATELET # BLD AUTO: 176 10(3)UL (ref 150–450)
PLATELET # BLD AUTO: 177 10(3)UL (ref 150–450)
PLATELET # BLD AUTO: 177 10(3)UL (ref 150–450)
PLATELET # BLD AUTO: 239 10(3)UL (ref 150–450)
PLATELET # BLD AUTO: 242 10(3)UL (ref 150–450)
PLATELET # BLD AUTO: 250 10(3)UL (ref 150–450)
PLATELET # BLD AUTO: 251 10(3)UL (ref 150–450)
PLATELET # BLD AUTO: 253 10(3)UL (ref 150–450)
POTASSIUM SERPL-SCNC: 3.4 MMOL/L (ref 3.5–5.1)
POTASSIUM SERPL-SCNC: 3.5 MMOL/L (ref 3.5–5.1)
POTASSIUM SERPL-SCNC: 3.5 MMOL/L (ref 3.5–5.1)
POTASSIUM SERPL-SCNC: 3.6 MMOL/L (ref 3.5–5.1)
POTASSIUM SERPL-SCNC: 3.6 MMOL/L (ref 3.5–5.1)
POTASSIUM SERPL-SCNC: 3.7 MMOL/L (ref 3.5–5.1)
POTASSIUM SERPL-SCNC: 3.8 MMOL/L (ref 3.5–5.1)
POTASSIUM SERPL-SCNC: 4.1 MMOL/L (ref 3.5–5.1)
POTASSIUM SERPL-SCNC: 4.1 MMOL/L (ref 3.5–5.1)
POTASSIUM SERPL-SCNC: 4.3 MMOL/L (ref 3.5–5.1)
POTASSIUM SERPL-SCNC: 4.3 MMOL/L (ref 3.5–5.1)
POTASSIUM SERPL-SCNC: 4.4 MMOL/L (ref 3.5–5.1)
POTASSIUM SERPL-SCNC: 4.4 MMOL/L (ref 3.5–5.1)
POTASSIUM SERPL-SCNC: 4.5 MMOL/L (ref 3.5–5.1)
POTASSIUM SERPL-SCNC: 4.7 MMOL/L (ref 3.5–5.1)
POTASSIUM SERPL-SCNC: 4.8 MMOL/L (ref 3.5–5.1)
PROT UR STRIP.AUTO-MCNC: 30 MG/DL
PROT UR STRIP.AUTO-MCNC: NEGATIVE MG/DL
PSA SERPL DL<=0.01 NG/ML-MCNC: 16.2 SECONDS (ref 12.5–14.7)
PSA SERPL DL<=0.01 NG/ML-MCNC: 16.8 SECONDS (ref 12.5–14.7)
PSA SERPL DL<=0.01 NG/ML-MCNC: 17.4 SECONDS (ref 12.5–14.7)
PSA SERPL DL<=0.01 NG/ML-MCNC: 19 SECONDS (ref 12.5–14.7)
PSA SERPL DL<=0.01 NG/ML-MCNC: 19.8 SECONDS (ref 12.5–14.7)
PSA SERPL DL<=0.01 NG/ML-MCNC: 20.3 SECONDS (ref 12.5–14.7)
PSA SERPL DL<=0.01 NG/ML-MCNC: 20.3 SECONDS (ref 12.5–14.7)
PSA SERPL DL<=0.01 NG/ML-MCNC: 20.4 SECONDS (ref 12.5–14.7)
PSA SERPL DL<=0.01 NG/ML-MCNC: 20.7 SECONDS (ref 12.5–14.7)
PSA SERPL DL<=0.01 NG/ML-MCNC: 21.7 SECONDS (ref 12.5–14.7)
PSA SERPL DL<=0.01 NG/ML-MCNC: 22.1 SECONDS (ref 12.5–14.7)
PSA SERPL DL<=0.01 NG/ML-MCNC: 24.5 SECONDS (ref 12.5–14.7)
PSA SERPL DL<=0.01 NG/ML-MCNC: 25.5 SECONDS (ref 12.5–14.7)
PSA SERPL DL<=0.01 NG/ML-MCNC: 25.5 SECONDS (ref 12.5–14.7)
PSA SERPL DL<=0.01 NG/ML-MCNC: 28.7 SECONDS (ref 12.5–14.7)
PSA SERPL DL<=0.01 NG/ML-MCNC: 31.6 SECONDS (ref 12.5–14.7)
PSA SERPL DL<=0.01 NG/ML-MCNC: 35.7 SECONDS (ref 12.5–14.7)
PSA SERPL DL<=0.01 NG/ML-MCNC: 35.7 SECONDS (ref 12.5–14.7)
PSA SERPL DL<=0.01 NG/ML-MCNC: 42.7 SECONDS (ref 12.5–14.7)
PSA SERPL DL<=0.01 NG/ML-MCNC: 43.5 SECONDS (ref 12.5–14.7)
Q-T INTERVAL: 410 MS
Q-T INTERVAL: 428 MS
QRS DURATION: 92 MS
QRS DURATION: 92 MS
QTC CALCULATION (BEZET): 498 MS
QTC CALCULATION (BEZET): 515 MS
R AXIS: -48 DEGREES
R AXIS: -61 DEGREES
RBC # BLD AUTO: 4.17 X10(6)UL (ref 3.8–5.3)
RBC # BLD AUTO: 4.24 X10(6)UL (ref 3.8–5.3)
RBC # BLD AUTO: 4.33 X10(6)UL (ref 3.8–5.3)
RBC # BLD AUTO: 4.34 X10(6)UL (ref 3.8–5.3)
RBC # BLD AUTO: 4.35 X10(6)UL (ref 3.8–5.3)
RBC # BLD AUTO: 4.36 X10(6)UL (ref 3.8–5.3)
RBC # BLD AUTO: 4.36 X10(6)UL (ref 3.8–5.3)
RBC # BLD AUTO: 4.37 X10(6)UL (ref 3.8–5.3)
RBC # BLD AUTO: 4.4 X10(6)UL (ref 3.8–5.3)
RBC # BLD AUTO: 4.43 X10(6)UL (ref 3.8–5.3)
RBC # BLD AUTO: 4.54 X10(6)UL (ref 3.8–5.3)
RBC # BLD AUTO: 4.6 X10(6)UL (ref 3.8–5.3)
RBC # BLD AUTO: 4.62 X10(6)UL (ref 3.8–5.3)
RBC # BLD AUTO: 4.85 X10(6)UL (ref 3.8–5.3)
RBC #/AREA URNS AUTO: >10 /HPF
RBC UR QL AUTO: NEGATIVE
SODIUM SERPL-SCNC: 137 MMOL/L (ref 136–145)
SODIUM SERPL-SCNC: 138 MMOL/L (ref 136–145)
SODIUM SERPL-SCNC: 141 MMOL/L (ref 136–145)
SODIUM SERPL-SCNC: 142 MMOL/L (ref 136–145)
SODIUM SERPL-SCNC: 143 MMOL/L (ref 136–145)
SODIUM SERPL-SCNC: 144 MMOL/L (ref 136–145)
SODIUM SERPL-SCNC: 145 MMOL/L (ref 136–145)
SODIUM SERPL-SCNC: 146 MMOL/L (ref 136–145)
SODIUM SERPL-SCNC: 148 MMOL/L (ref 136–145)
SODIUM SERPL-SCNC: 150 MMOL/L (ref 136–145)
SODIUM SERPL-SCNC: 150 MMOL/L (ref 136–145)
SODIUM SERPL-SCNC: 154 MMOL/L (ref 136–145)
SP GR UR STRIP.AUTO: 1 (ref 1–1.03)
SP GR UR STRIP.AUTO: <1.005 (ref 1–1.03)
T AXIS: 80 DEGREES
T AXIS: 88 DEGREES
TROPONIN I SERPL-MCNC: 0.08 NG/ML (ref ?–0.04)
TROPONIN I SERPL-MCNC: 0.09 NG/ML (ref ?–0.04)
UROBILINOGEN UR STRIP.AUTO-MCNC: <2 MG/DL
UROBILINOGEN UR STRIP.AUTO-MCNC: <2 MG/DL
VENTRICULAR RATE: 87 BPM
VENTRICULAR RATE: 89 BPM
WBC # BLD AUTO: 11.9 X10(3) UL (ref 4–11)
WBC # BLD AUTO: 12.1 X10(3) UL (ref 4–11)
WBC # BLD AUTO: 12.4 X10(3) UL (ref 4–11)
WBC # BLD AUTO: 6.9 X10(3) UL (ref 4–11)
WBC # BLD AUTO: 7.1 X10(3) UL (ref 4–11)
WBC # BLD AUTO: 7.2 X10(3) UL (ref 4–11)
WBC # BLD AUTO: 7.4 X10(3) UL (ref 4–11)
WBC # BLD AUTO: 8.2 X10(3) UL (ref 4–11)
WBC # BLD AUTO: 8.2 X10(3) UL (ref 4–11)
WBC # BLD AUTO: 8.5 X10(3) UL (ref 4–11)
WBC # BLD AUTO: 9 X10(3) UL (ref 4–11)
WBC # BLD AUTO: 9.6 X10(3) UL (ref 4–11)
WBC # BLD AUTO: 9.7 X10(3) UL (ref 4–11)
WBC # BLD AUTO: 9.9 X10(3) UL (ref 4–11)
WBC #/AREA URNS AUTO: >50 /HPF
WBC CLUMPS UR QL AUTO: PRESENT

## 2020-01-01 PROCEDURE — 80053 COMPREHEN METABOLIC PANEL: CPT

## 2020-01-01 PROCEDURE — 70551 MRI BRAIN STEM W/O DYE: CPT | Performed by: OTHER

## 2020-01-01 PROCEDURE — 81001 URINALYSIS AUTO W/SCOPE: CPT

## 2020-01-01 PROCEDURE — 87186 SC STD MICRODIL/AGAR DIL: CPT

## 2020-01-01 PROCEDURE — 85025 COMPLETE CBC W/AUTO DIFF WBC: CPT

## 2020-01-01 PROCEDURE — 85610 PROTHROMBIN TIME: CPT

## 2020-01-01 PROCEDURE — 71045 X-RAY EXAM CHEST 1 VIEW: CPT | Performed by: INTERNAL MEDICINE

## 2020-01-01 PROCEDURE — 83735 ASSAY OF MAGNESIUM: CPT

## 2020-01-01 PROCEDURE — 70450 CT HEAD/BRAIN W/O DYE: CPT | Performed by: INTERNAL MEDICINE

## 2020-01-01 PROCEDURE — 80178 ASSAY OF LITHIUM: CPT

## 2020-01-01 PROCEDURE — 73220 MRI UPPR EXTREMITY W/O&W/DYE: CPT | Performed by: OTHER

## 2020-01-01 PROCEDURE — 99223 1ST HOSP IP/OBS HIGH 75: CPT | Performed by: CLINICAL NURSE SPECIALIST

## 2020-01-01 PROCEDURE — 87086 URINE CULTURE/COLONY COUNT: CPT

## 2020-01-01 PROCEDURE — 87086 URINE CULTURE/COLONY COUNT: CPT | Performed by: INTERNAL MEDICINE

## 2020-01-01 PROCEDURE — 93306 TTE W/DOPPLER COMPLETE: CPT | Performed by: INTERNAL MEDICINE

## 2020-01-01 PROCEDURE — 81003 URINALYSIS AUTO W/O SCOPE: CPT | Performed by: INTERNAL MEDICINE

## 2020-01-01 PROCEDURE — 0BH17EZ INSERTION OF ENDOTRACHEAL AIRWAY INTO TRACHEA, VIA NATURAL OR ARTIFICIAL OPENING: ICD-10-PCS | Performed by: ANESTHESIOLOGY

## 2020-01-01 PROCEDURE — 85025 COMPLETE CBC W/AUTO DIFF WBC: CPT | Performed by: INTERNAL MEDICINE

## 2020-01-01 PROCEDURE — 71045 X-RAY EXAM CHEST 1 VIEW: CPT | Performed by: EMERGENCY MEDICINE

## 2020-01-01 PROCEDURE — 99309 SBSQ NF CARE MODERATE MDM 30: CPT | Performed by: NURSE PRACTITIONER

## 2020-01-01 PROCEDURE — 99316 NF DSCHRG MGMT 30 MIN+: CPT | Performed by: NURSE PRACTITIONER

## 2020-01-01 PROCEDURE — 99308 SBSQ NF CARE LOW MDM 20: CPT | Performed by: NURSE PRACTITIONER

## 2020-01-01 PROCEDURE — 80053 COMPREHEN METABOLIC PANEL: CPT | Performed by: INTERNAL MEDICINE

## 2020-01-01 PROCEDURE — 93971 EXTREMITY STUDY: CPT | Performed by: OTHER

## 2020-01-01 PROCEDURE — 70450 CT HEAD/BRAIN W/O DYE: CPT | Performed by: EMERGENCY MEDICINE

## 2020-01-01 PROCEDURE — 02HV33Z INSERTION OF INFUSION DEVICE INTO SUPERIOR VENA CAVA, PERCUTANEOUS APPROACH: ICD-10-PCS | Performed by: HOSPITALIST

## 2020-01-01 PROCEDURE — 36415 COLL VENOUS BLD VENIPUNCTURE: CPT

## 2020-01-01 PROCEDURE — 99306 1ST NF CARE HIGH MDM 50: CPT | Performed by: FAMILY MEDICINE

## 2020-01-01 PROCEDURE — 5A1945Z RESPIRATORY VENTILATION, 24-96 CONSECUTIVE HOURS: ICD-10-PCS | Performed by: INTERNAL MEDICINE

## 2020-01-01 PROCEDURE — B548ZZA ULTRASONOGRAPHY OF SUPERIOR VENA CAVA, GUIDANCE: ICD-10-PCS | Performed by: HOSPITALIST

## 2020-01-01 PROCEDURE — 99358 PROLONG SERVICE W/O CONTACT: CPT | Performed by: FAMILY MEDICINE

## 2020-01-01 PROCEDURE — 87077 CULTURE AEROBIC IDENTIFY: CPT

## 2020-01-01 PROCEDURE — 80048 BASIC METABOLIC PNL TOTAL CA: CPT

## 2020-01-01 RX ORDER — GLYCOPYRROLATE 0.2 MG/ML
0.4 INJECTION, SOLUTION INTRAMUSCULAR; INTRAVENOUS
Status: DISCONTINUED | OUTPATIENT
Start: 2020-01-01 | End: 2020-01-01

## 2020-01-01 RX ORDER — MORPHINE SULFATE 4 MG/ML
10 INJECTION, SOLUTION INTRAMUSCULAR; INTRAVENOUS EVERY 10 MIN PRN
Status: CANCELLED | OUTPATIENT
Start: 2020-01-01

## 2020-01-01 RX ORDER — ACETAMINOPHEN 650 MG/1
650 SUPPOSITORY RECTAL EVERY 4 HOURS PRN
Status: DISCONTINUED | OUTPATIENT
Start: 2020-01-01 | End: 2020-01-01

## 2020-01-01 RX ORDER — NALOXONE HYDROCHLORIDE 0.4 MG/ML
INJECTION, SOLUTION INTRAMUSCULAR; INTRAVENOUS; SUBCUTANEOUS
Status: COMPLETED | OUTPATIENT
Start: 2020-01-01 | End: 2020-01-01

## 2020-01-01 RX ORDER — ACETAMINOPHEN 160 MG/5ML
650 SOLUTION ORAL EVERY 4 HOURS PRN
Status: DISCONTINUED | OUTPATIENT
Start: 2020-01-01 | End: 2020-01-01

## 2020-01-01 RX ORDER — MORPHINE SULFATE 4 MG/ML
10 INJECTION, SOLUTION INTRAMUSCULAR; INTRAVENOUS EVERY 10 MIN PRN
Status: DISCONTINUED | OUTPATIENT
Start: 2020-01-01 | End: 2020-01-01

## 2020-01-01 RX ORDER — LORAZEPAM 2 MG/ML
0.5 INJECTION INTRAMUSCULAR EVERY 10 MIN PRN
Status: DISCONTINUED | OUTPATIENT
Start: 2020-01-01 | End: 2020-01-01

## 2020-01-01 RX ORDER — HEPARIN SODIUM AND DEXTROSE 10000; 5 [USP'U]/100ML; G/100ML
INJECTION INTRAVENOUS CONTINUOUS
Status: DISCONTINUED | OUTPATIENT
Start: 2020-01-01 | End: 2020-01-01

## 2020-01-01 RX ORDER — MORPHINE SULFATE 4 MG/ML
4 INJECTION, SOLUTION INTRAMUSCULAR; INTRAVENOUS EVERY 10 MIN PRN
Status: DISCONTINUED | OUTPATIENT
Start: 2020-01-01 | End: 2020-01-01

## 2020-01-01 RX ORDER — WARFARIN SODIUM 1 MG/1
1 TABLET ORAL DAILY
Status: ON HOLD | COMMUNITY
Start: 2020-01-01 | End: 2020-01-01

## 2020-01-01 RX ORDER — MAGNESIUM OXIDE 400 MG (241.3 MG MAGNESIUM) TABLET
400 TABLET ONCE
Status: COMPLETED | OUTPATIENT
Start: 2020-01-01 | End: 2020-01-01

## 2020-01-01 RX ORDER — FAMOTIDINE 10 MG/ML
20 INJECTION, SOLUTION INTRAVENOUS DAILY
Status: DISCONTINUED | OUTPATIENT
Start: 2020-01-01 | End: 2020-01-01

## 2020-01-01 RX ORDER — SODIUM CHLORIDE 0.9 % (FLUSH) 0.9 %
10 SYRINGE (ML) INJECTION AS NEEDED
Status: DISCONTINUED | OUTPATIENT
Start: 2020-01-01 | End: 2020-01-01

## 2020-01-01 RX ORDER — POTASSIUM CHLORIDE 20 MEQ/1
40 TABLET, EXTENDED RELEASE ORAL ONCE
Status: COMPLETED | OUTPATIENT
Start: 2020-01-01 | End: 2020-01-01

## 2020-01-01 RX ORDER — HYDROCODONE BITARTRATE AND ACETAMINOPHEN 5; 325 MG/1; MG/1
1-2 TABLET ORAL EVERY 6 HOURS PRN
Status: DISCONTINUED | OUTPATIENT
Start: 2020-01-01 | End: 2020-01-01 | Stop reason: SDUPTHER

## 2020-01-01 RX ORDER — MORPHINE SULFATE 4 MG/ML
6 INJECTION, SOLUTION INTRAMUSCULAR; INTRAVENOUS EVERY 2 HOUR PRN
Status: DISCONTINUED | OUTPATIENT
Start: 2020-01-01 | End: 2020-01-01

## 2020-01-01 RX ORDER — MORPHINE SULFATE 4 MG/ML
2 INJECTION, SOLUTION INTRAMUSCULAR; INTRAVENOUS EVERY 10 MIN PRN
Status: DISCONTINUED | OUTPATIENT
Start: 2020-01-01 | End: 2020-01-01

## 2020-01-01 RX ORDER — LORAZEPAM 2 MG/ML
INJECTION INTRAMUSCULAR
Status: COMPLETED
Start: 2020-01-01 | End: 2020-01-01

## 2020-01-01 RX ORDER — ALBUTEROL SULFATE 90 UG/1
2 AEROSOL, METERED RESPIRATORY (INHALATION)
Status: DISCONTINUED | OUTPATIENT
Start: 2020-01-01 | End: 2020-01-01

## 2020-01-01 RX ORDER — POLYETHYLENE GLYCOL 3350 17 G/17G
17 POWDER, FOR SOLUTION ORAL DAILY PRN
Status: DISCONTINUED | OUTPATIENT
Start: 2020-01-01 | End: 2020-01-01

## 2020-01-01 RX ORDER — DEXMEDETOMIDINE HYDROCHLORIDE 4 UG/ML
INJECTION, SOLUTION INTRAVENOUS CONTINUOUS
Status: DISCONTINUED | OUTPATIENT
Start: 2020-01-01 | End: 2020-01-01

## 2020-01-01 RX ORDER — LITHIUM CARBONATE 150 MG/1
150 CAPSULE ORAL NIGHTLY
Status: DISCONTINUED | OUTPATIENT
Start: 2020-01-01 | End: 2020-01-01

## 2020-01-01 RX ORDER — POTASSIUM CHLORIDE 14.9 MG/ML
20 INJECTION INTRAVENOUS ONCE
Status: COMPLETED | OUTPATIENT
Start: 2020-01-01 | End: 2020-01-01

## 2020-01-01 RX ORDER — LORAZEPAM 2 MG/ML
1 INJECTION INTRAMUSCULAR EVERY 10 MIN PRN
Status: DISCONTINUED | OUTPATIENT
Start: 2020-01-01 | End: 2020-01-01

## 2020-01-01 RX ORDER — FENTANYL 100 UG/H
2 PATCH TRANSDERMAL
Status: DISCONTINUED | OUTPATIENT
Start: 2020-01-01 | End: 2020-01-01

## 2020-01-01 RX ORDER — HYDROCODONE BITARTRATE AND ACETAMINOPHEN 5; 325 MG/1; MG/1
1 TABLET ORAL EVERY 6 HOURS PRN
Status: DISCONTINUED | OUTPATIENT
Start: 2020-01-01 | End: 2020-01-01

## 2020-01-01 RX ORDER — DEXTROSE MONOHYDRATE 25 G/50ML
50 INJECTION, SOLUTION INTRAVENOUS
Status: DISCONTINUED | OUTPATIENT
Start: 2020-01-01 | End: 2020-01-01

## 2020-01-01 RX ORDER — MORPHINE SULFATE 4 MG/ML
2 INJECTION, SOLUTION INTRAMUSCULAR; INTRAVENOUS EVERY 2 HOUR PRN
Status: DISCONTINUED | OUTPATIENT
Start: 2020-01-01 | End: 2020-01-01

## 2020-01-01 RX ORDER — OMEPRAZOLE 40 MG/1
40 CAPSULE, DELAYED RELEASE ORAL DAILY
Status: ON HOLD | COMMUNITY
Start: 2020-01-01 | End: 2020-01-01

## 2020-01-01 RX ORDER — ACETAMINOPHEN 650 MG/1
650 SUPPOSITORY RECTAL EVERY 6 HOURS PRN
Status: DISCONTINUED | OUTPATIENT
Start: 2020-01-01 | End: 2020-01-01

## 2020-01-01 RX ORDER — LITHIUM CARBONATE 150 MG/1
150 CAPSULE ORAL NIGHTLY
Qty: 30 CAPSULE | Refills: 0 | Status: SHIPPED | OUTPATIENT
Start: 2020-01-01 | End: 2020-01-01

## 2020-01-01 RX ORDER — ONDANSETRON 4 MG/1
4 TABLET, ORALLY DISINTEGRATING ORAL EVERY 6 HOURS PRN
Status: DISCONTINUED | OUTPATIENT
Start: 2020-01-01 | End: 2020-01-01

## 2020-01-01 RX ORDER — LORAZEPAM 2 MG/ML
2 INJECTION INTRAMUSCULAR EVERY 10 MIN PRN
Status: DISCONTINUED | OUTPATIENT
Start: 2020-01-01 | End: 2020-01-01

## 2020-01-01 RX ORDER — MIDAZOLAM HYDROCHLORIDE 1 MG/ML
2 INJECTION INTRAMUSCULAR; INTRAVENOUS EVERY 5 MIN PRN
Status: DISCONTINUED | OUTPATIENT
Start: 2020-01-01 | End: 2020-01-01

## 2020-01-01 RX ORDER — AMILORIDE HYDROCHLORIDE 5 MG/1
5 TABLET ORAL DAILY
Qty: 30 TABLET | Refills: 0 | Status: SHIPPED | OUTPATIENT
Start: 2020-01-01 | End: 2020-01-01

## 2020-01-01 RX ORDER — LORAZEPAM 1 MG/1
1 TABLET ORAL NIGHTLY
Status: ON HOLD | COMMUNITY
Start: 2020-01-01 | End: 2020-01-01

## 2020-01-01 RX ORDER — MORPHINE SULFATE 4 MG/ML
INJECTION, SOLUTION INTRAMUSCULAR; INTRAVENOUS
Status: COMPLETED
Start: 2020-01-01 | End: 2020-01-01

## 2020-01-01 RX ORDER — CARVEDILOL 6.25 MG/1
6.25 TABLET ORAL 2 TIMES DAILY WITH MEALS
Qty: 60 TABLET | Refills: 0 | Status: SHIPPED | OUTPATIENT
Start: 2020-01-01 | End: 2020-01-01

## 2020-01-01 RX ORDER — SCOLOPAMINE TRANSDERMAL SYSTEM 1 MG/1
1 PATCH, EXTENDED RELEASE TRANSDERMAL
Status: DISCONTINUED | OUTPATIENT
Start: 2020-01-01 | End: 2020-01-01

## 2020-01-01 RX ORDER — FENTANYL 100 UG/H
2 PATCH TRANSDERMAL
Status: DISCONTINUED | OUTPATIENT
Start: 2020-05-06 | End: 2020-01-01

## 2020-01-01 RX ORDER — HEPARIN SODIUM AND DEXTROSE 10000; 5 [USP'U]/100ML; G/100ML
18 INJECTION INTRAVENOUS ONCE
Status: COMPLETED | OUTPATIENT
Start: 2020-01-01 | End: 2020-01-01

## 2020-01-01 RX ORDER — ACETAMINOPHEN 160 MG/5ML
650 SOLUTION ORAL EVERY 6 HOURS PRN
Status: DISCONTINUED | OUTPATIENT
Start: 2020-01-01 | End: 2020-01-01

## 2020-01-01 RX ORDER — CHLORHEXIDINE GLUCONATE 0.12 MG/ML
15 RINSE ORAL
Status: DISCONTINUED | OUTPATIENT
Start: 2020-01-01 | End: 2020-01-01

## 2020-01-01 RX ORDER — LORAZEPAM 2 MG/ML
1 INJECTION INTRAMUSCULAR EVERY 10 MIN PRN
Status: CANCELLED | OUTPATIENT
Start: 2020-01-01

## 2020-01-01 RX ORDER — ONDANSETRON 2 MG/ML
4 INJECTION INTRAMUSCULAR; INTRAVENOUS EVERY 6 HOURS PRN
Status: DISCONTINUED | OUTPATIENT
Start: 2020-01-01 | End: 2020-01-01

## 2020-01-01 RX ORDER — HYDROCODONE BITARTRATE AND ACETAMINOPHEN 5; 325 MG/1; MG/1
2 TABLET ORAL EVERY 6 HOURS PRN
Status: DISCONTINUED | OUTPATIENT
Start: 2020-01-01 | End: 2020-01-01

## 2020-01-01 RX ORDER — SODIUM PHOSPHATE, DIBASIC AND SODIUM PHOSPHATE, MONOBASIC 7; 19 G/133ML; G/133ML
1 ENEMA RECTAL ONCE AS NEEDED
Status: DISCONTINUED | OUTPATIENT
Start: 2020-01-01 | End: 2020-01-01

## 2020-01-01 RX ORDER — ONDANSETRON 2 MG/ML
4 INJECTION INTRAMUSCULAR; INTRAVENOUS EVERY 4 HOURS PRN
Status: DISCONTINUED | OUTPATIENT
Start: 2020-01-01 | End: 2020-01-01

## 2020-01-01 RX ORDER — HYDROCODONE BITARTRATE AND ACETAMINOPHEN 5; 325 MG/1; MG/1
1 TABLET ORAL EVERY 6 HOURS PRN
Qty: 20 TABLET | Refills: 0 | Status: SHIPPED | OUTPATIENT
Start: 2020-01-01 | End: 2020-01-01

## 2020-01-01 RX ORDER — ACETAMINOPHEN 120 MG/1
120 SUPPOSITORY RECTAL ONCE
Status: DISCONTINUED | OUTPATIENT
Start: 2020-01-01 | End: 2020-01-01

## 2020-01-01 RX ORDER — LITHIUM CARBONATE 150 MG/1
150 CAPSULE ORAL DAILY
Status: ON HOLD | COMMUNITY
Start: 2020-01-01 | End: 2020-01-01

## 2020-01-01 RX ORDER — MAGNESIUM SULFATE HEPTAHYDRATE 40 MG/ML
2 INJECTION, SOLUTION INTRAVENOUS ONCE
Status: COMPLETED | OUTPATIENT
Start: 2020-01-01 | End: 2020-01-01

## 2020-01-01 RX ORDER — AMLODIPINE BESYLATE 10 MG/1
10 TABLET ORAL DAILY
Qty: 30 TABLET | Refills: 0 | Status: SHIPPED | OUTPATIENT
Start: 2020-01-01 | End: 2020-01-01

## 2020-01-01 RX ORDER — PANTOPRAZOLE SODIUM 40 MG/1
40 TABLET, DELAYED RELEASE ORAL
Status: DISCONTINUED | OUTPATIENT
Start: 2020-01-01 | End: 2020-01-01

## 2020-01-01 RX ORDER — OXYCODONE HYDROCHLORIDE AND ACETAMINOPHEN 5; 325 MG/1; MG/1
TABLET ORAL EVERY 6 HOURS PRN
COMMUNITY
End: 2020-01-01

## 2020-01-01 RX ORDER — LORAZEPAM 2 MG/ML
2 INJECTION INTRAMUSCULAR EVERY 4 HOURS PRN
Status: DISCONTINUED | OUTPATIENT
Start: 2020-01-01 | End: 2020-01-01

## 2020-01-01 RX ORDER — WARFARIN SODIUM 5 MG/1
5 TABLET ORAL
Status: COMPLETED | OUTPATIENT
Start: 2020-01-01 | End: 2020-01-01

## 2020-01-01 RX ORDER — ALBUTEROL SULFATE 90 UG/1
8 AEROSOL, METERED RESPIRATORY (INHALATION) 4 TIMES DAILY
Status: DISCONTINUED | OUTPATIENT
Start: 2020-01-01 | End: 2020-01-01

## 2020-01-01 RX ORDER — POTASSIUM CHLORIDE 29.8 MG/ML
40 INJECTION INTRAVENOUS ONCE
Status: COMPLETED | OUTPATIENT
Start: 2020-01-01 | End: 2020-01-01

## 2020-01-01 RX ORDER — ACETAMINOPHEN 325 MG/1
650 TABLET ORAL EVERY 6 HOURS PRN
Status: DISCONTINUED | OUTPATIENT
Start: 2020-01-01 | End: 2020-01-01

## 2020-01-01 RX ORDER — SCOLOPAMINE TRANSDERMAL SYSTEM 1 MG/1
1 PATCH, EXTENDED RELEASE TRANSDERMAL
Status: CANCELLED | OUTPATIENT
Start: 2020-01-01

## 2020-01-01 RX ORDER — FAMOTIDINE 20 MG/1
20 TABLET ORAL DAILY
Status: DISCONTINUED | OUTPATIENT
Start: 2020-01-01 | End: 2020-01-01

## 2020-01-01 RX ORDER — CARVEDILOL 6.25 MG/1
6.25 TABLET ORAL 2 TIMES DAILY WITH MEALS
Status: DISCONTINUED | OUTPATIENT
Start: 2020-01-01 | End: 2020-01-01

## 2020-01-01 RX ORDER — SODIUM CHLORIDE 450 MG/100ML
INJECTION, SOLUTION INTRAVENOUS ONCE
Status: COMPLETED | OUTPATIENT
Start: 2020-01-01 | End: 2020-01-01

## 2020-01-01 RX ORDER — SODIUM CHLORIDE 0.9 % (FLUSH) 0.9 %
10 SYRINGE (ML) INJECTION EVERY 12 HOURS
Status: DISCONTINUED | OUTPATIENT
Start: 2020-01-01 | End: 2020-01-01

## 2020-01-01 RX ORDER — WARFARIN SODIUM 6 MG/1
6 TABLET ORAL NIGHTLY
Qty: 1 TABLET | Refills: 0 | Status: SHIPPED | OUTPATIENT
Start: 2020-01-01 | End: 2020-01-01

## 2020-01-01 RX ORDER — MIDAZOLAM HYDROCHLORIDE 1 MG/ML
INJECTION INTRAMUSCULAR; INTRAVENOUS
Status: COMPLETED
Start: 2020-01-01 | End: 2020-01-01

## 2020-01-01 RX ORDER — BISACODYL 10 MG
10 SUPPOSITORY, RECTAL RECTAL
Status: DISCONTINUED | OUTPATIENT
Start: 2020-01-01 | End: 2020-01-01

## 2020-01-01 RX ORDER — LORAZEPAM 2 MG/ML
0.5 INJECTION INTRAMUSCULAR EVERY 10 MIN PRN
Status: CANCELLED | OUTPATIENT
Start: 2020-01-01

## 2020-01-01 RX ORDER — ATROPINE SULFATE 10 MG/ML
2 SOLUTION/ DROPS OPHTHALMIC EVERY 2 HOUR PRN
Status: DISCONTINUED | OUTPATIENT
Start: 2020-01-01 | End: 2020-01-01

## 2020-01-01 RX ORDER — FENTANYL 100 UG/H
2 PATCH TRANSDERMAL
Status: CANCELLED | OUTPATIENT
Start: 2020-05-06

## 2020-01-01 RX ORDER — MORPHINE SULFATE 4 MG/ML
4 INJECTION, SOLUTION INTRAMUSCULAR; INTRAVENOUS EVERY 10 MIN PRN
Status: CANCELLED | OUTPATIENT
Start: 2020-01-01

## 2020-01-01 RX ORDER — QUETIAPINE 25 MG/1
75 TABLET, FILM COATED ORAL NIGHTLY
Status: ON HOLD | COMMUNITY
End: 2020-01-01

## 2020-01-01 RX ORDER — MORPHINE SULFATE 4 MG/ML
6 INJECTION, SOLUTION INTRAMUSCULAR; INTRAVENOUS EVERY 10 MIN PRN
Status: DISCONTINUED | OUTPATIENT
Start: 2020-01-01 | End: 2020-01-01

## 2020-01-01 RX ORDER — LORAZEPAM 2 MG/ML
1 INJECTION INTRAMUSCULAR EVERY 4 HOURS PRN
Status: DISCONTINUED | OUTPATIENT
Start: 2020-01-01 | End: 2020-01-01

## 2020-01-01 RX ORDER — AMILORIDE HYDROCHLORIDE 5 MG/1
5 TABLET ORAL DAILY
Status: DISCONTINUED | OUTPATIENT
Start: 2020-01-01 | End: 2020-01-01

## 2020-01-01 RX ORDER — ACETAMINOPHEN 650 MG/1
SUPPOSITORY RECTAL
Status: COMPLETED
Start: 2020-01-01 | End: 2020-01-01

## 2020-01-01 RX ORDER — MIDAZOLAM HYDROCHLORIDE 1 MG/ML
2 INJECTION INTRAMUSCULAR; INTRAVENOUS ONCE
Status: COMPLETED | OUTPATIENT
Start: 2020-01-01 | End: 2020-01-01

## 2020-01-01 RX ORDER — ACETAMINOPHEN 120 MG/1
650 SUPPOSITORY RECTAL ONCE
Status: COMPLETED | OUTPATIENT
Start: 2020-01-01 | End: 2020-01-01

## 2020-01-01 RX ORDER — MORPHINE SULFATE 4 MG/ML
4 INJECTION, SOLUTION INTRAMUSCULAR; INTRAVENOUS EVERY 2 HOUR PRN
Status: DISCONTINUED | OUTPATIENT
Start: 2020-01-01 | End: 2020-01-01

## 2020-01-01 RX ORDER — WARFARIN SODIUM 5 MG/1
5 TABLET ORAL DAILY
Status: ON HOLD | COMMUNITY
Start: 2020-01-01 | End: 2020-01-01

## 2020-01-01 RX ORDER — HEPARIN SODIUM 5000 [USP'U]/ML
30 INJECTION, SOLUTION INTRAVENOUS; SUBCUTANEOUS ONCE
Status: COMPLETED | OUTPATIENT
Start: 2020-01-01 | End: 2020-01-01

## 2020-01-01 RX ORDER — METHYLPREDNISOLONE SODIUM SUCCINATE 125 MG/2ML
80 INJECTION, POWDER, LYOPHILIZED, FOR SOLUTION INTRAMUSCULAR; INTRAVENOUS ONCE
Status: DISCONTINUED | OUTPATIENT
Start: 2020-01-01 | End: 2020-01-01

## 2020-01-01 RX ORDER — HEPARIN SODIUM 5000 [USP'U]/ML
5000 INJECTION, SOLUTION INTRAVENOUS; SUBCUTANEOUS EVERY 8 HOURS SCHEDULED
Status: DISCONTINUED | OUTPATIENT
Start: 2020-01-01 | End: 2020-01-01

## 2020-01-01 RX ORDER — ROCURONIUM BROMIDE 10 MG/ML
INJECTION, SOLUTION INTRAVENOUS AS NEEDED
Status: DISCONTINUED | OUTPATIENT
Start: 2020-01-01 | End: 2020-01-01

## 2020-01-01 RX ORDER — SERTRALINE HYDROCHLORIDE 100 MG/1
100 TABLET, FILM COATED ORAL NIGHTLY
Qty: 30 TABLET | Refills: 0 | Status: SHIPPED | OUTPATIENT
Start: 2020-01-01 | End: 2020-01-01

## 2020-01-01 RX ORDER — SODIUM CHLORIDE 9 MG/ML
INJECTION, SOLUTION INTRAVENOUS CONTINUOUS
Status: ACTIVE | OUTPATIENT
Start: 2020-01-01 | End: 2020-01-01

## 2020-01-01 RX ORDER — ETOMIDATE 2 MG/ML
INJECTION INTRAVENOUS AS NEEDED
Status: DISCONTINUED | OUTPATIENT
Start: 2020-01-01 | End: 2020-01-01

## 2020-01-01 RX ORDER — MORPHINE SULFATE 4 MG/ML
2 INJECTION, SOLUTION INTRAMUSCULAR; INTRAVENOUS EVERY 10 MIN PRN
Status: CANCELLED | OUTPATIENT
Start: 2020-01-01

## 2020-01-01 RX ORDER — OXYCODONE AND ACETAMINOPHEN 10; 325 MG/1; MG/1
1 TABLET ORAL EVERY 6 HOURS PRN
Status: ON HOLD | COMMUNITY
Start: 2020-01-01 | End: 2020-01-01

## 2020-01-01 RX ORDER — LORAZEPAM 2 MG/ML
0.5 INJECTION INTRAMUSCULAR EVERY 4 HOURS PRN
Status: DISCONTINUED | OUTPATIENT
Start: 2020-01-01 | End: 2020-01-01

## 2020-01-01 RX ORDER — LORAZEPAM 2 MG/ML
2 INJECTION INTRAMUSCULAR EVERY 10 MIN PRN
Status: CANCELLED | OUTPATIENT
Start: 2020-01-01

## 2020-01-01 RX ADMIN — ROCURONIUM BROMIDE 100 MG: 10 INJECTION, SOLUTION INTRAVENOUS at 10:52:00

## 2020-01-01 RX ADMIN — ETOMIDATE 10 MG: 2 INJECTION INTRAVENOUS at 10:52:00

## 2020-01-01 NOTE — PROGRESS NOTES
[facility] PULMONARY/CRITICAL CARE    S: Pt is currently on CPAP. Denies sob.      Meds:  • pantoprazole (PROTONIX) IV push  40 mg Intravenous Q24H   • meropenem  500 mg Intravenous Q12H   • Chlorhexidine Gluconate  15 mL Mouth/Throat [email or web address]   • Sertraline 14.7*  --  13.5*  13.5* 12.4*   .0  --  193.0  193.0 151.0     Recent Labs   Lab 12/28/19  2153  12/30/19  0441  12/31/19  0500 12/31/19  1453 01/01/20  0445   *   < > 130*   < > 167* 155* 151*   BUN 39*   < > 34*   < > 28* 28* 25*   KARIME scds  12. Dispo  - full code  - stable for transfer to the floor as long as ok with surgery    Jami Reed M.D.   Pulmonary/Critical Care

## 2020-01-01 NOTE — PROGRESS NOTES
[facility] Hospitalist Progress Note     Gardenia Mcgowan Patient Status:  Inpatient    1955 MRN [id number]   [facility] 3NW-A Attending Oumar Royal MD   [facility] Day # 13 PCP Kaleb Murray MD     CC: follow up    SUBJECTIVE:  NGT remov 34* 28* 28* 25*   CREATSERUM 2.09*   < > 1.81* 1.57*   < > 1.40*  1.40* 1.35* 1.48* 1.51* 1.46* 1.33* 1.30*   CA 9.6   < > 9.0 9.5   < > 9.4  9.4 9.8 9.8 9.7 9.7 9.1 9.2   MG 2.8*  --  2.7* 2.5  --   --   --   --   --  2.4  --  2.1   PHOS 3.4  --  3.4 3.0 acetaminophen **OR** acetaminophen **OR** acetaminophen, Labetalol HCl, ondansetron HCl, hydrALAzine HCl, Ipratropium Bromide, ipratropium-albuterol, sodium Chloride        Assessment/Plan:     63y/o F with MMP including COPD, SMITA, obesity, tobacco use, HT start w/ gen surg. Has lovenox allergy listed but discussed this with pharmacy and reaction was apparently bruising/rash - unclear if was in fact allergic reaction. Will introduce ppx heparin, have notified RN to monitor carefully for allergic reaction.

## 2020-01-01 NOTE — PROGRESS NOTES
[facility]  Progress Note    Jesus Arambula Patient Status:  Inpatient    1955 MRN [id number]   [facility] 4SW-A Attending Logan Goetz MD   [facility] Day # 13 PCP Betty Nieto MD     Subjective:  Up in chair.   Minimal output

## 2020-01-01 NOTE — PLAN OF CARE
Received pt asleep, awakes easily to touch, smiles, denies any pain, saturation 91%, pt has hoarse sound, trying to clear her throat, unable, good oral care given, suctioned dry chunk of phlegm from the back of the throat, now pt is able to talk in normal

## 2020-01-01 NOTE — RESPIRATORY THERAPY NOTE
SMITA - Equipment Use Daily Summary:  · Set Mode CFLEX  · Usage in hours: 1  · 90% Pressure (EPAP) level:   · 90% Insp Pressure (IPAP): 5  · AHI: 1  · Supplemental Oxygen:  · Comments:

## 2020-01-01 NOTE — PROGRESS NOTES
[facility]    Nephrology Progress Note    Prasanna De La O Attending:  Karen Steinberg MD       SUBJECTIVE:     Sleepy but arousable  Hypernatremia improving.  Awaiting swallow, remains NPO    PHYSICAL EXAM:     Vital Signs: /78 (BP Location: NEPRELIM 11.91 (H) 12/31/2019    NEPERCENT 88.3 12/31/2019    LYPERCENT 5.2 12/31/2019    MOPERCENT 3.7 12/31/2019    EOPERCENT 2.1 12/31/2019    BAPERCENT 0.1 12/31/2019    NE 11.91 (H) 12/31/2019    LYMABS 0.70 (L) 12/31/2019    MOABSO 0.50 12/31/2019 (TYLENOL) 650 MG rectal suppository 650 mg, 650 mg, Rectal, Q6H PRN  Sertraline HCl (ZOLOFT) tab 100 mg, 100 mg, Oral, Nightly  carvedilol (COREG) tab 6.25 mg, 6.25 mg, Oral, BID with meals  lidocaine-menthol 4-1 % 1 patch, 1 patch, Transdermal, Daily  amL bloody NGT output noted --> PPI  -- meropenem          Thank you for allowing me to participate in the care of this patient. Please do not hesitate to call with questions or concerns.     MD Josué Bruce 2 Nephrology  Richard Elkins

## 2020-01-01 NOTE — PROGRESS NOTES
Vss. Pt alert to self and knows she is in the hospital but unable to tell me her birth year, although knows birth date. Pt on ra with 02 sats wnl. Lungs cta. Pt denies difficulty breathing or sob. Pt denies chest pain. HR sr on tele.  Pt denies n/v. Pt rem

## 2020-01-02 NOTE — PROGRESS NOTES
[facility] Pharmacy Note: Route Optimization for Pantoprazole (PROTONIX)    Patient is currently on Pantoprazole (PROTONIX) 40 mg IV every 24 hours.    The patient meets the criteria to convert to the oral equivalent as established by the IV to Oral conversion pro

## 2020-01-02 NOTE — PHYSICAL THERAPY NOTE
PHYSICAL THERAPY TREATMENT NOTE - INPATIENT    Room Number: 180/042-M     Session: 1   Number of Visits to Meet Established Goals: 5     History related to current admission: Pt admitted on 12/14/19 with AMS and weakness.  Pt has a medical history to inclu LAPAROTOMY N/A 12/28/2019    Performed by Gautam Rome MD at [facility]   • MOUTH ODONTECTOMY N/A 6/12/2015    Performed by Bhargavi Rodriguez DDS at [facility] MAIN OR   • OTHER Bilateral 1969    Knee caps removed   • OTHER  2004    All upper teeth removal   • OT Assistance: Not tested  Distance (ft): 0  Assistive Device: Rolling walker  Pattern: Shuffle  Stoop/Curb Assistance: Not tested  Comment : Above FIM scores are defined per dept policy    Skilled Therapy Provided: Pt received seated eob with [facility] staff, agre assistance      Goal #4     Goal #5     Goal #6     Goal Comments: Goals established on 12/30/2019, ongoing 1/2/2020

## 2020-01-02 NOTE — PROGRESS NOTES
[facility]  Progress Note    Sai Rodrigues Patient Status:  Inpatient    1955 MRN [id number]   [facility] 3NW-A Attending Arianna Carmona MD   [facility] Day # 12 PCP Bernarda Malik MD     Subjective:    Patient had swallow study classified elsewhere(327.27)     OAB (overactive bladder)     Urge incontinence of urine     Primary osteoarthritis involving multiple joints     Gait instability     Neuropathic pain     Essential hypertension     Primary hypercoagulable state ([facility] [address] [N56.

## 2020-01-02 NOTE — PROGRESS NOTES
[facility] Hospitalist Progress Note     Steffi Clark Patient Status:  Inpatient    1955 MRN [id number]   [facility] 3NW-A Attending Shantel Dick MD   [facility] Day # 12 PCP Nicky Park MD     CC: follow up    SUBJECTIVE:  Doing wel > 25.0  25.0 25.0   < > 25.0 26.0 25.0 25.0  --   --  21.0   BUN 67* 50*   < > 39*  39* 34*   < > 34* 28* 28* 25*  --   --  20*   CREATSERUM 1.81* 1.57*   < > 1.40*  1.40* 1.35*   < > 1.51* 1.46* 1.33* 1.30*  --   --  1.12*   CA 9.0 9.5   < > 9.4  9.4 9.8 the ED by family due to altered mental status.     # Bowel perforation  - s/p right hemicolectomy with ostomy by gen surg  - NGT out  - SLP saw for diet recs. CLD today. - IVFs  - PRN pain/nausea control     # Acute encephalopathy - improved.  Mentating w

## 2020-01-02 NOTE — CONSULTS
[facility]  Report of Inpatient Ostomy Consultation     Haily Davison Patient Status:  Inpatient    1955 MRN [id number]   [facility] 3NW-A 317/317-A Attending Renita Turcios MD   [facility] Day # 12 PCP Sammy Cabrales MD SURGICAL HISTORY      marshall knee cap removal   • REPAIR OF RECTOCELE     • TONSILLECTOMY     • TUBAL LIGATION        Social History    Tobacco Use      Smoking status: Current Every Day Smoker        Packs/day: 1.00        Years: 40.00        Pack years: 36 throughout education and seemed confused when asked where she lived and who would be taking care of the ileostomy and mucus fistula. Written materials left at bedside and patient was encouraged to watch the educational video.  Pouch change education was com

## 2020-01-02 NOTE — PHYSICAL THERAPY NOTE
Duplicate PT order received upon transfer to the floor. Pt evaluated by PT on 12/30 with recs for MICH. Will continue to follow for further treatment sessions while in house.

## 2020-01-02 NOTE — OCCUPATIONAL THERAPY NOTE
OCCUPATIONAL THERAPY TREATMENT NOTE - INPATIENT     Room Number: 119/326-V  Session: 1   Number of Visits to Meet Established Goals: 7    Presenting Problem: acute encephalopathy; bowel perforation s/p ex-lap, right hemicolectomy, ileostomy, mucuous fistul History  Past Surgical History:   Procedure Laterality Date   • BENIGN BIOPSY LEFT  1998    cyst removed   • CHOLECYSTECTOMY     • EXPLORATORY LAPAROTOMY N/A 12/28/2019    Performed by Gerber Mauricio MD at [facility] MAIN OR   • MOUTH ODONTECTOMY N/A 6/12/2015 that she had surgery done. Supine to EOB max A w/ nursing team.  Patient sitting EOB with close SBA for static sit and min A for dynamic balance. Sit to stand lift used from EOB w/ education to nursing team on use of lift.   Patient tolerated transfer strengthening/ROM; Patient/Family education;Patient/Family training;Equipment eval/education; Neuromuscluar reeducation; Compensatory technique education; Fine motor coordination activities  Rehab Potential : Fair  Frequency (Obs): 5x/week    ADL Goals   Patie

## 2020-01-02 NOTE — CONSULTS
Consultation to use D5W as diluent for antibiotics but given lack of stability of meropenem in dextrose, we will leave in normal saline.     Lelo Nicholson PharmD  01/02/20, 11:38 AM

## 2020-01-02 NOTE — RESPIRATORY THERAPY NOTE
SMITA - Equipment Use Daily Summary:                  . Set Mode: CPAP                . Usage in hours: 2:00                . 90% Pressure (EPAP) level: 5                . 90% Insp. Pressure (IPAP): Slater August AHI: 0                .  Supplemental Oxygen

## 2020-01-02 NOTE — DIETARY MALNUTRITION NOTE
[facility]    NUTRITION ASSESSMENT    Pt meets moderate malnutrition criteria. NUTRITION DIAGNOSIS/PROBLEM:    Inadequate oral intake related to decreased ability to consume sufficient energy as evidenced by poor po intakes per intake record.  - con new wt recorded. If po intake is not improved may consider alternative nutrition support if aggressive care is pursued and pt can tolerate. Will continue to follow po intake. 12/20:  Patient seen for length of stay.   Patient with poor po intakes due

## 2020-01-02 NOTE — PROGRESS NOTES
[facility] Pharmacy Note:  Renal Adjustment for meropenem ([place])    Donny Cain is a 75year old female who has been prescribed meropenem (MERREM) 500 mg every 12 hrs.   CrCl is estimated creatinine clearance is 55.2 mL/min (A) (based on SCr of 1.12 mg/dL

## 2020-01-02 NOTE — SLP NOTE
ADULT SWALLOWING EVALUATION    ASSESSMENT    ASSESSMENT/OVERALL IMPRESSION:  Order received for re-evaluation of Patient's swallow function;  Pt well known to this service from initial BSE completed on 12/15/19 with multiple follow up visits and re-evaluati signs of aspiration, and/or MD desires. RECOMMENDATIONS   Diet Recommendations - Solids: Puree  Diet Recommendations - Liquid:  Thin by teaspoon sip at a time  Position Pt upright as possible for any PO intake  Benefits from verbal cues for head forward/ liquids  Precautions: None    Patient/Family Goals: not stated    SWALLOWING HISTORY  Current Diet Consistency: Regular; Thin liquids     Dysphagia History: as per above    Imaging Results: CXR 12/31/19:  1.  The heart size and vascularity are normal.  There tolerate trial upgrade of soft solids consistency and N/A liquids without overt signs or symptoms of aspiration with 90 % accuracy over 2-3 session(s). Not Addressed     FOLLOW UP  Treatment Plan/Recommendations: Dysphagia therapy; Aspiration precautions

## 2020-01-02 NOTE — PROGRESS NOTES
[address] [place] [address]  TEL: [phone]  FAX: [phone]    Lenin Pratt Patient Status:  Inpatient    1955 MRN [id number]   [facility] 4SW-A Attending Judy Cevallos MD   [facility] Day # 16 PCP Deejay Hernandez < > 3.2* 3.3* 3.5  --   --  3.6   *   < > 128* 127* 125*  --   --  121*   CO2 28.0   < > 26.0 25.0 25.0  --   --  21.0   ALKPHO 91  --   --   --   --   --   --   --    AST 27  --   --   --   --   --   --   --    ALT 34  --   --   --   --   --   --

## 2020-01-02 NOTE — PROGRESS NOTES
[address]  Patient Status:  Inpatient    1955 MRN [id number]   [facility] 3NW-A Attending Carine Garner MD   [facility] Day # 12 PCP Michaela Barakat MD     Pulm / Critical Care Progress Note     S: pt states that and rhythm, normal S1S2, no murmur. Abdomen: soft, postop changes.  Dec bs   Extremity: no edema         Recent Labs   Lab 12/31/19  0500 01/01/20  0445 01/02/20  0534   WBC 13.5*  13.5* 12.4* 12.1*   HGB 13.8  13.8 12.9 12.3   HCT 46.8  46.8 43.6 39.9 for now.  Please contact with ?s or change in status.        Maik Staley M.D.  [facility] pulmonary / critical care  1/2/2020  1:10 PM

## 2020-01-02 NOTE — PLAN OF CARE
Problem: Patient/Family Goals  Goal: Patient/Family Long Term Goal  Description  Patient's Long Term Goal: Return to previous mental status    Interventions:  - Take the least amount of pain meds needed.   -  - See additional Care Plan goals for specific patient's normal rest cycle i.e. lights off, TV off, minimize noise and interruptions  - Encourage family to assist in orientation and promotion of home routines  Outcome: Progressing     Problem: Impaired Activities of Daily Living  Goal: Achieve highest/ mobility/gait  Description  Interventions:  - Assess patient's functional ability and stability  - Promote increasing activity/tolerance for mobility and gait  - Educate and engage patient/family in tolerated activity level and precautions  -recommend use

## 2020-01-02 NOTE — PROGRESS NOTES
Vss. Pt assisted up to chair with physical therapy and sit to stand lift without difficulty. Pt become slightly hypotensive while in chair with lowest bp 88/61. bp now 94/67. Other vss. Pt does reports some dizziness.  Pt c/o abdominal pain as well but stat

## 2020-01-03 NOTE — PROGRESS NOTES
[facility]    Nephrology Progress Note    Gardenia Mcgowan Attending:  Oumar Royal MD       SUBJECTIVE:     Sleepy but arousable  No complaints    PHYSICAL EXAM:     Vital Signs: /74 (BP Location: Left arm)   Pulse 72   Temp 97.4 °F (36. 88.3 12/31/2019    LYPERCENT 5.2 12/31/2019    MOPERCENT 3.7 12/31/2019    EOPERCENT 2.1 12/31/2019    BAPERCENT 0.1 12/31/2019    NE 11.91 (H) 12/31/2019    LYMABS 0.70 (L) 12/31/2019    MOABSO 0.50 12/31/2019    EOABSO 0.28 12/31/2019    BAABSO 0.01 12/3 PRN  Sertraline HCl (ZOLOFT) tab 100 mg, 100 mg, Oral, Nightly  lidocaine-menthol 4-1 % 1 patch, 1 patch, Transdermal, Daily  amLODIPine Besylate (NORVASC) tab 10 mg, 10 mg, Oral, Daily  Labetalol HCl (TRANDATE) injection 10 mg, 10 mg, Intravenous, Q6H PRN RN     Thank you for allowing me to participate in the care of this patient. Please do not hesitate to call with questions or concerns.     Sanjeev Stanford MD  [facility] Nephrology  [address] [place] [address]  [place], [address]

## 2020-01-03 NOTE — PROGRESS NOTES
Pt oriented to self and place. Forgetful. Follows commands. Sit to stand. Lungs clear;diminished. RR 20-25. Room air. Abdomen soft;nondistended. Midline incision with staples dry and intact open to air. Ileostomy to right emptying liquid stool.  Mucou

## 2020-01-03 NOTE — PROGRESS NOTES
[facility]    Nephrology Progress Note    Zachary Marsh Attending:  Ashley Pace MD       SUBJECTIVE:     More awake  Able to take in po now    PHYSICAL EXAM:     Vital Signs: /76 (BP Location: Left arm)   Pulse 82   Temp 98.2 °F (36.8 12/31/2019    LYPERCENT 5.2 12/31/2019    MOPERCENT 3.7 12/31/2019    EOPERCENT 2.1 12/31/2019    BAPERCENT 0.1 12/31/2019    NE 11.91 (H) 12/31/2019    LYMABS 0.70 (L) 12/31/2019    MOABSO 0.50 12/31/2019    EOABSO 0.28 12/31/2019    BAABSO 0.01 12/31/201 suppository 650 mg, 650 mg, Rectal, Q6H PRN  Sertraline HCl (ZOLOFT) tab 100 mg, 100 mg, Oral, Nightly  lidocaine-menthol 4-1 % 1 patch, 1 patch, Transdermal, Daily  amLODIPine Besylate (NORVASC) tab 10 mg, 10 mg, Oral, Daily  Labetalol HCl (TRANDATE) inje perforation and peritonitis:  -- s/p R hemicolectomy and ileostomy 12/28  -- bloody NGT output noted --> PPI  -- abx     Kaushik RN     Thank you for allowing me to participate in the care of this patient.  Please do not hesitate to call with questions or gunnar

## 2020-01-03 NOTE — PROGRESS NOTES
[facility]  TEL: [phone]  FAX: [phone]    Kashmir Rosanna Patient Status:  Inpatient    1955 MRN [id number]   [facility] 4SW-A Attending Melissa Maynard MD   [facility] Day # 16 PCP Shar Miguel 148* 145 146*   K 3.8   < > 3.5  --  3.6  --  3.5   *   < > 125*  --  121*  --  118*   CO2 28.0   < > 25.0  --  21.0  --  20.0*   ALKPHO 91  --   --   --   --   --   --    AST 27  --   --   --   --   --   --    ALT 34  --   --   --   --   --   --

## 2020-01-03 NOTE — PROGRESS NOTES
[facility] Hospitalist Progress Note     Pietro Shipman Patient Status:  Inpatient    1955 MRN [id number]   [facility] 3NW-A Attending Nasir Orlando MD   [facility] Day # 16 PCP Deyanira Champagne MD     CC: follow up    SUBJECTIVE:  Somnolent --  143*  --  101*    < > = values in this interval not displayed.        Recent Labs   Lab 12/28/19  2153  12/30/19  0441 12/31/19  0500 01/01/20  0445 01/02/20  0534 01/03/20  0514   ALT 34  --   --   --   --   --   --    AST 27  --   --   --   --   -- oxybutinin, etc. as OP  - HCT negative for acute process  - neuro and psych consulted, apprec recs  - EEG negative for seizure, + generalized slowing  - MRI negative for acute CVA  - see mgmt of lithium toxicity and chronic pain below  - minimize narcotics

## 2020-01-03 NOTE — PLAN OF CARE
Problem: Patient/Family Goals  Goal: Patient/Family Long Term Goal  Description  Patient's Long Term Goal: Return to previous mental status    Interventions:  - Take the least amount of pain meds needed.   -  - See additional Care Plan goals for specific Encourage use of hearing aids, eye glasses  - Promote highest level of mobility daily  - Provide frequent reorientation  - Promote wakefulness i.e. lights on, blinds open  - Promote sleep, encourage patient's normal rest cycle i.e. lights off, TV off, mini Manage/alleviate anxiety  - Monitor for signs/symptoms of CO2 retention  Outcome: Progressing     Problem: Impaired Functional Mobility  Goal: Achieve highest/safest level of mobility/gait  Description  Interventions:  - Assess patient's functional ability

## 2020-01-03 NOTE — PLAN OF CARE
Pt is oriented to self and hospital setting. Pt is confused but able to follow commands. Lungs are diminished bilaterally. RA. Refusing CPAP. Tele monitor and  in place. Right ileostomy draining dark liquid stool.  Left ileostomy with scant drainage in b gait  - Educate and engage patient/family in tolerated activity level and precautions  - Recommend use of  RW for transfers and ambulation   Outcome: Progressing     Problem: Impaired Cognition  Goal: Patient will exhibit improved attention, thought proces oxygenation  Description  INTERVENTIONS:  - Assess for changes in respiratory status  - Assess for changes in mentation and behavior  - Position to facilitate oxygenation and minimize respiratory effort  - Oxygen supplementation based on oxygen saturation preferences  - Enhance eating environment  Outcome: Progressing

## 2020-01-03 NOTE — SLP NOTE
SPEECH DAILY NOTE - INPATIENT    ASSESSMENT & PLAN   ASSESSMENT  Pt seen for dysphagia tx to assess tolerance with recommended diet, ensure proper utilization of aspiration precautions and provide pt/family education.  Patient currently on clear liquid diet GOALS  Goal #1 The patient will tolerate N/A consistency and NECTAR THICK/Clears liquids without overt signs or symptoms of aspiration with 90 % accuracy over 1-2 session(s).   REVISED   Goal #2 The patient/family/caregiver will demonstrate understandin

## 2020-01-03 NOTE — OCCUPATIONAL THERAPY NOTE
OCCUPATIONAL THERAPY TREATMENT NOTE - INPATIENT     Room Number: 736/054-Z  Session: 2  Number of Visits to Meet Established Goals: 7    Presenting Problem: acute encephalopathy; bowel perforation s/p ex-lap, right hemicolectomy, ileostomy, mucuous fistula History  Past Surgical History:   Procedure Laterality Date   • BENIGN BIOPSY LEFT  1998    cyst removed   • CHOLECYSTECTOMY     • EXPLORATORY LAPAROTOMY N/A 12/28/2019    Performed by David Allen MD at [facility] MAIN OR   • MOUTH ODONTECTOMY N/A 6/12/2015 that's why she has abd pain. Able to follow simple motor commands consistently throughout session. Patient alert and eyes open throughout session. Patient initiating conversation and asking appropriate questions.   Side lying to supine w/ min A  Supine t Device Recommendations: TBD    PLAN  OT Treatment Plan: Balance activities; Energy conservation/work simplification techniques;ADL training;Functional transfer training; Endurance training;UE strengthening/ROM; Patient/Family education;Patient/Family training

## 2020-01-03 NOTE — PLAN OF CARE
Problem: Impaired Swallowing  Goal: Minimize aspiration risk  Description  Interventions: Recommend 1:1 feeder assistance     - Patient should be alert and upright for all feedings (90 degrees preferred)  - Offer nectar thick clear liquids at a slow rate

## 2020-01-03 NOTE — PROGRESS NOTES
[facility]  Progress Note    Prasanna De La O Patient Status:  Inpatient    1955 MRN [id number]   [facility] 3NW-A Attending Michelle Tuttle, [address] [facility] Day # 16 PCP Mary Osorio MD     Subjective:    Complaints of abdominal di chronicity     Chondrocalcinosis     Right leg weakness     Nicotine dependence with nicotine-induced disorder     Central sleep apnea in conditions classified elsewhere(327.27)     OAB (overactive bladder)     Urge incontinence of urine     Primary Sophy Marine

## 2020-01-04 NOTE — PROGRESS NOTES
Donny Cain is a 75year old female.    Patient presents with:  Weakness    HPI:    Neurology progress note    Asked to re-evaluiate pt for right arm pain and weakness x 2 days    Pt initially admitted for encephalopathy, felt to be due to psych meds Years: 40.00        Pack years: 40        Types: Cigarettes      Smokeless tobacco: Never Used      Tobacco comment: \"this is not the year\"    Alcohol use: No      Alcohol/week: 0.0 standard drinks    Drug use: No      Family History   Adopted: Yes   Fam NEEDED  Sertraline HCl 100 MG Oral Tab, Take 2 tablets (200 mg total) by mouth daily. Lithium Carbonate 300 MG Oral Tab, Take 3 tablets (900 mg total) by mouth nightly.   diazepam 5 MG Oral Tab, Take 1 tablet (5 mg total) by mouth nightly as needed for [facility] Wt 225 lb 5 oz (102.2 kg)   SpO2 96%   BMI 31.44 kg/m²     Awake and alert o x 3  Pleasant and cooperative  Rue 5/5  rle prox 5-/5 df 5-, pf 5-  Rue may be pain-limited, as the entire upper part of the shoulder area is tender.  Deltoid, upper pectoral, BP

## 2020-01-04 NOTE — RESPIRATORY THERAPY NOTE
SMITA Equipment Usage Summary :            Set Mode :AUTO CPAP W FLEX          Usage in Hours:00;24          90% Pressure (EPAP) : 5           90% Insp Pressure (IPAP);           AHI : 15          Supplemental Oxygen :      LPM           Patient only used the

## 2020-01-04 NOTE — PROGRESS NOTES
[facility]  Progress Note    Haily Davison Patient Status:  Inpatient    1955 MRN [id number]   [facility] [place] 3NW-A Attending Raúl Bazan, [address] [facility] Day # 25 PCP Sammy Cabrales MD     Subjective:    Complains of shoulder pain Chondrocalcinosis     Right leg weakness     Nicotine dependence with nicotine-induced disorder     Central sleep apnea in conditions classified elsewhere(327.27)     OAB (overactive bladder)     Urge incontinence of urine     Primary osteoarthritis involv

## 2020-01-04 NOTE — PROGRESS NOTES
[facility] Hospitalist Progress Note     Pau Garcia Patient Status:  Inpatient    1955 MRN [id number]   [facility] 3NW-A Attending Jt Flores MD   [facility] Day # 25 PCP Fabiola Thomas MD     CC: follow up    SUBJECTIVE:  More aler 01/02/20  0534 01/03/20  0514 01/04/20  0608   ALT 34  --   --   --   --   --   --    AST 27  --   --   --   --   --   --    ALB 3.1*   < > 2.3* 2.2* 2.0* 2.1* 2.1*    < > = values in this interval not displayed.        Recent Labs   Lab 12/28/19  2329   PG tightness  -pt with several pain complaints, doubt cardiac etiology  -EKG without ischemic changes, check trop     # Bowel perforation  - s/p right hemicolectomy with ostomy by gen surg  - SLP saw for diet recs.  CLD started, advance per sugery  - PRN pain/

## 2020-01-04 NOTE — PROGRESS NOTES
[facility]    Nephrology Progress Note    Haily Davison Attending:  Renita Turcios MD       SUBJECTIVE:     More awake  Able to take in po now    PHYSICAL EXAM:     Vital Signs: BP (!) 160/98 (BP Location: Right arm)   Pulse 92   Temp 98.3 °F 13.0 01/04/2020    MOPERCENT 5.4 01/04/2020    EOPERCENT 3.0 01/04/2020    BAPERCENT 0.3 01/04/2020    NE 7.46 01/04/2020    LYMABS 1.25 01/04/2020    MOABSO 0.52 01/04/2020    EOABSO 0.29 01/04/2020    BAABSO 0.03 01/04/2020     Lab Results   Component Va Q6H PRN  Sertraline HCl (ZOLOFT) tab 100 mg, 100 mg, Oral, Nightly  lidocaine-menthol 4-1 % 1 patch, 1 patch, Transdermal, Daily  amLODIPine Besylate (NORVASC) tab 10 mg, 10 mg, Oral, Daily  Labetalol HCl (TRANDATE) injection 10 mg, 10 mg, Intravenous, Q6H improves so she can hydrate on her own. Has had a tough course.      [address] [facility] Nephrology

## 2020-01-04 NOTE — PROGRESS NOTES
[address] [place] [address]  TEL: [phone]  FAX: [phone]    Reida Situ Patient Status:  Inpatient    1955 MRN [id number]   [facility] 4SW-A Attending Ivanna Monsivais MD   [facility] Day # 25 PCP Shayla August CA 9.5   < > 8.7  --  8.8 8.9 9.0   ALB 3.1*   < > 2.0*  --  2.1*  --  2.1*   *   < > 148*   < > 146* 144 144   K 3.8   < > 3.6  --  3.5 3.4* 3.6   *   < > 121*  --  118* 116* 117*   CO2 28.0   < > 21.0  --  20.0* 23.0 20.0*   ALKPHO 91  --

## 2020-01-04 NOTE — CONSULTS
[facility] Cardiology Consultation    Steffi Clark Patient Status:  Inpatient    1955 MRN [id number]   [facility] 3NW-A Attending Ellyn Delvalle, [address] [facility] Day # 25 PCP Nicky Park MD     Reason for Consultation:  CP      History of [facility] MAIN OR   • MOUTH ODONTECTOMY N/A 6/12/2015    Performed by Caroline Nunez DDS at [facility] MAIN OR   • OTHER Bilateral 1969    Knee caps removed   • OTHER  2004    All upper teeth removal   • OTHER      Placement of IVC filter   • OTHER SURGICAL HISTORY 18  BP: (120-160)/(70-98) 160/98    Wt Readings from Last 3 Encounters:  12/31/19 : 225 lb 5 oz (102.2 kg)  12/16/19 : 255 lb 1.2 oz (115.7 kg)  10/30/19 : 250 lb (113.4 kg)      General: No apparent distress  HEENT: No focal deficits. Neck: supple.  JVP n 01/04/20  1404   TROP 0.079*             Impression:    CP/troponin  abnl EKG  LESTER/CKD  HTN  Bipolar  AMS  S/p colon resection/ileostomy for bowel perforation  R arm weakness/recent encephalopathy  spinaL stenosis  Protein S deficiency-off coumadin for flor

## 2020-01-04 NOTE — PLAN OF CARE
Problem: Impaired Swallowing  Goal: Minimize aspiration risk  Description  Interventions: Recommend 1:1 feeder assistance     - Patient should be alert and upright for all feedings (90 degrees preferred)  - Offer nectar thick clear liquids at a slow rate Impaired Functional Mobility  Goal: Achieve highest/safest level of mobility/gait  Description  Interventions:  - Assess patient's functional ability and stability  - Promote increasing activity/tolerance for mobility and gait  - Educate and engage patient ordered  - Assess for signs and symptoms of hyperglycemia and hypoglycemia  - Administer ordered medications to maintain glucose within target range  - Assess barriers to adequate nutritional intake and initiate nutrition consult as needed  - Instruct jatin

## 2020-01-04 NOTE — PROGRESS NOTES
Dr. Ros Cummings at bedside, pt notified her of chest pain, orders received for troponin and EKG. 1310 page to Dr. Ros Cummings to notify EKG results in. Called consult to cardiology. Dr. Cornelio Soliman at bedside. Notified Dr. Cornelio Soliman of Doppler results.  Verbal

## 2020-01-05 NOTE — PROGRESS NOTES
Virgen Crew is a 75year old female.    Patient presents with:  Weakness    HPI:    Neurology progress note    Pt had u/s rue, no DVT but has extensive superficial venous thrombus  IV site changed from right arm, pt says pain has improves  Pt was fou reflux    • Hearing impairment     hearing loss   • High blood pressure    • High cholesterol    • Hx of diseases NEC     BIPOLAR DISORDER   • Hx of diseases NEC     CHRONIC PAIN (S/P DVTS)   • Hx of diseases NEC     PROTEIN S DEFICIENCY   • Hx of diseases UT) Oral Cap, Take 50,000 Units by mouth every 7 days. Warfarin Sodium 1 MG Oral Tab, Take 1 mg by mouth. Sunday, Monday, Wednesday and, Friday  Warfarin Sodium 2.5 MG Oral Tab, Take 2.5 mg by mouth nightly.   Oxybutynin Chloride ER 5 MG Oral Tablet 24 Hr, sure  Cipro [Ciprofloxaci*      Demerol                   Demerol [Meperidine]        Comment:Nausea and vomiting  Gabapentin              OTHER (SEE COMMENTS)    Comment:Loss of vision  Lovenox [Enoxaparin]    RASH  Macrobid [Nitrofura*        Comment:SOB

## 2020-01-05 NOTE — PLAN OF CARE
Ativan pre med given, to MRI per bed. Tele, jewelry & medication patches removed prior to transport. IV saline locked & flushed.

## 2020-01-05 NOTE — PROGRESS NOTES
[facility]  Progress Note    Jil Chisholm Patient Status:  Inpatient    1955 MRN [id number]   [facility] 3NW-A Attending Zahra Romero, [address] [facility] Day # 23 PCP Paddy Mann MD     Subjective:    Feeling better.   Less naus chronicity     Chondrocalcinosis     Right leg weakness     Nicotine dependence with nicotine-induced disorder     Central sleep apnea in conditions classified elsewhere(327.27)     OAB (overactive bladder)     Urge incontinence of urine     Primary Rebecca Joshi

## 2020-01-05 NOTE — PROGRESS NOTES
[facility]    Nephrology Progress Note    Kaila Ambriz Attending:  Osiris Crump MD       SUBJECTIVE:     More awake  Able to take in po now  Hoping to get upgraded diet in am     PHYSICAL EXAM:     Vital Signs: /78 (BP Location: Right NEPERCENT 77.4 01/04/2020    LYPERCENT 13.0 01/04/2020    MOPERCENT 5.4 01/04/2020    EOPERCENT 3.0 01/04/2020    BAPERCENT 0.3 01/04/2020    NE 7.46 01/04/2020    LYMABS 1.25 01/04/2020    MOABSO 0.52 01/04/2020    EOABSO 0.29 01/04/2020    BAABSO 0.03 01 650 mg, 650 mg, Oral, Q6H PRN    Or  acetaminophen (TYLENOL) 650 MG rectal suppository 650 mg, 650 mg, Rectal, Q6H PRN  Sertraline HCl (ZOLOFT) tab 100 mg, 100 mg, Oral, Nightly  lidocaine-menthol 4-1 % 1 patch, 1 patch, Transdermal, Daily  amLODIPine Besy disorder:  -- lithium restarted at lower dose, monitor closely     AMS:  -- per neurology    Cecum perforation and peritonitis:  -- s/p R hemicolectomy and ileostomy 12/28  -- bloody NGT output noted --> PPI  -- abx    Overall the pt seems to be making a n

## 2020-01-05 NOTE — PROGRESS NOTES
[facility] Hospitalist Progress Note     Tiffany Chris Patient Status:  Inpatient    1955 MRN [id number]   [facility] 3NW-A Attending Faizan Dawkins MD   [facility] Day # 23 PCP Pat Marques MD     CC: follow up    SUBJECTIVE:  Pt states (bjy=91286)    Result Date: 1/4/2020  CONCLUSION:  1. No DVT is seen.  2. Extensive superficial venous thrombus is seen throughout the right cephalic vein from the right lateral clavicular region to the wrist.    Dictated by: Rhina Gonzalez MD on 1/04/20 sugery  - PRN pain/nausea control     # Acute encephalopathy - improved   - multifactorial 2/2 polypharmacy and lithium toxicity - pt was on fentanyl patch, oxy, benzo, oxybutinin, etc. as OP  - HCT negative for acute process  - neuro and psych consulted,

## 2020-01-05 NOTE — PROGRESS NOTES
Pt oriented to self and place. Confuses birthday and year. Reorientation and emotional support as needed. Lungs are clear;diminished. 92-93% on room air. SMITA CPAP with tele. Tele NSR with occasional PVC. Abdomen soft;nondistended. Tender.  Bowel sound

## 2020-01-05 NOTE — PROGRESS NOTES
[facility] Cardiology  Progress Note    Gaetano Wheeler Patient Status:  Inpatient    1955 MRN [id number]   [facility] 3NW-A Attending Sandrita Nunez, [address] [facility] Day # 23 PCP Morgan Aponte MD       Subjective:      Ms. Maddie Canales 3.5 3.4* 3.6 3.8   *  --  121*  --  118* 116* 117* 118*   CO2 25.0  --  21.0  --  20.0* 23.0 20.0* 19.0*   BUN 25*  --  20*  --  16 15 11 10   CREATSERUM 1.30*  --  1.12*  --  1.08* 1.10* 0.96 0.97   CA 9.2  --  8.7  --  8.8 8.9 9.0 9.2   MG 2.1  -- sq heparin     Bipolar disorder  Encephalopathy  Polypharmacy, lithium toxicity on admission    Perforated bowel s/p ex lap, right hemicolectomy, ileostomy 12/28/19  Peritonitis  Per IM, ID, surgery    Dispo: Full code.      Discussed plan of care with

## 2020-01-05 NOTE — PLAN OF CARE
Problem: Patient/Family Goals  Goal: Patient/Family Long Term Goal  Description  Patient's Long Term Goal: Return to previous mental status    Interventions:  - Take the least amount of pain meds needed.   -  - See additional Care Plan goals for specific delirium  Description  Interventions:  - Encourage use of hearing aids, eye glasses  - Promote highest level of mobility daily  - Provide frequent reorientation  - Promote wakefulness i.e. lights on, blinds open  - Promote sleep, encourage patient's normal Respiratory Therapy support as indicated  - Manage/alleviate anxiety  - Monitor for signs/symptoms of CO2 retention  Outcome: Progressing     Problem: Impaired Functional Mobility  Goal: Achieve highest/safest level of mobility/gait  Description  Dorotha Cancer

## 2020-01-05 NOTE — PLAN OF CARE
Pt is A/Ox3, confused. Neuro checks q4. [place], Dentures, glasses. HOB elevated. SMITA, no cpap. Tele, NSR/PVCS. SCDs in place. Ileostomy RLQ, draining brown loose stool. Illestomy LLQ, draining SS mucus plug. PIV in left arm, IV fluids and ABX infusing.  [place] level and precautions  - Recommend use of  RW for transfers and ambulation   Outcome: Progressing     Problem: Impaired Cognition  Goal: Patient will exhibit improved attention, thought processing and/or memory  Description  Interventions:  - Encourage use factors contributing to decreased intake, treat as appropriate  - Assist with meals as needed  - Monitor I&O, WT and lab values  - Obtain nutritional consult as needed  - Optimize oral hygiene and moisture  - Encourage food from home; allow for food prefer signs/symptoms of CO2 retention  Outcome: Progressing

## 2020-01-06 NOTE — SLP NOTE
Pt tolerating thickened clear liquid diet and now being advanced to full thickened liquids today. Will reassess and determine appropriateness for video swallow study when approved for advance to solids.     Wilian Bell MA, [address] [facility] [place]  Pager

## 2020-01-06 NOTE — PROGRESS NOTES
Pharmacy Dosing Service: Warfarin (Coumadin)    Whit Manzano is a 75year old female for whom pharmacy has been consulted to dose warfarin (COUMADIN) for  hypercoagulable state/protein S deficiency. Based on this indication, goal INR is 2-3.       Po

## 2020-01-06 NOTE — PROGRESS NOTES
[facility] Hospitalist Progress Note     [facility]      SUBJECTIVE:  Pain 8/10 in abdomen  Having some output from ostomy  Mental status improved    OBJECTIVE:  Temp:  [97.7 °F (36.5 °C)-99.1 °F (37.3 °C)] 99.1 °F (37.3 °C)  Pulse:  [75-89] 82  Resp:  [16 input(s): PGLU in the last 168 hours.     Imaging:  Xr Abdomen (1 View) (cpt=74018)    Result Date: 12/23/2019  PROCEDURE:  XR ABDOMEN (1 VIEW) (CPT=74018)  INDICATIONS:  abd distension, sbo  COMPARISON:  [facility] , XR, XR CHEST AP/PA (1 VIEW) (CPT=71045), 12 12/22/2019 at 12:42     Approved by: Joe Savage MD on 12/22/2019 at 12:43          Ct Brain Or Head (43200)    Result Date: 1/3/2020  PROCEDURE:  CT BRAIN OR HEAD (24907)  COMPARISON:  CADEN PHILLIP, CT BRAIN OR HEAD (48340), 12/14/2019, 16:06.   THERESA CT BRAIN OR HEAD (56063), 11/06/2019, 16:31. INDICATIONS:  weakness  TECHNIQUE:  Noncontrast CT scanning is performed through the brain. Dose reduction techniques were used.  Dose information is transmitted to the [facility] of Radiology) Joelle Slaughter 35 ( erich.  Flow voids are present within the internal carotid and basilar arteries.  Redemonstration of increased T2 signal within the left mastoid air cells, along with fluid within the right side of the sphenoid sinus, these features are stable since the prio within the left mastoid air cells concerning for potential mastoiditis. 4. Mild mucoperiosteal thickening within the floor of the right sphenoid sinus. Dictated by: Jose Lorenzo DO on 12/16/2019 at 22:28     Approved by:  Jose Lorenzo DO on 12/16/2019 a Radiology) NRDR ([address]) which includes the Dose Index Registry. PATIENT STATED HISTORY: (As transcribed by Technologist)  ELEVATED WBC    FINDINGS:  Lung bases demonstrate mild basilar atelectasis.   Motion artifact limits evalua indeterminate appearance. CONCLUSION:  Again noted is significant distention of the colon, particularly in the region of the cecum.   Evaluation is limited without intravenous or oral contrast.  Consider imaging with oral and IV contrast or direct vi variety of imaging planes and parameters were utilized for visualization of suspected pathology. Images were obtained both before and after administration of intravenous gadolinium.    PATIENT STATED HISTORY:(As transcribed by Technologist)  Patient has re obtained. COMPARISON:  [facility] , XR, XR CHEST AP PORTABLE  (CPT=71045), 12/28/2019, 22:02.   INDICATIONS:  ETT placement  PATIENT STATED HISTORY: (As transcribed by Technologist)  Adjustment of ET tube        CONCLUSION:    Endotracheal tube 4 cm above the Evelyn Shahid MD on 12/23/2019 at 21:31          Xr Chest Ap Portable  (cpt=71045)    Result Date: 12/14/2019  PROCEDURE:  XR CHEST AP PORTABLE  (CPT=71045)  TECHNIQUE:  AP chest radiograph was obtained.   COMPARISON:  [facility] , XR, XR CHEST AP PORTABLE  (CPT=71045) medications on file.       aMILoride HCl (MIDAMOR) tab 5 mg, 5 mg, Oral, Daily  LORazepam (ATIVAN) injection 0.5 mg, 0.5 mg, Intravenous, Q10 Min PRN  Pantoprazole Sodium (PROTONIX) 40 mg in Sodium Chloride 0.9 % 10 mL IV push, 40 mg, Intravenous, Q24H  Nor Nebulization, PRN  Umeclidinium Bromide (INCRUSE ELLIPTA) 62.5 MCG/INH inhaler 1 puff, 1 puff, Inhalation, Daily       Assessment/Plan:     65y/o F with PMH including COPD, SMITA, obesity, tobacco use, HTN, neuropathy, bipolar on lithium, depression, lumbar deficiency  - INR reversed for surgery  - holding coumadin until ok to resume with gen surg     # COPD  - not in exacerabation  - home inhalers     # SMITA   - CPAP per protocol     # Depression  - SSRI, dosing per psych     # Chronic pain with narcotic depe

## 2020-01-06 NOTE — PROGRESS NOTES
[facility]  Progress Note    Tj Watts Patient Status:  Inpatient    1955 MRN [id number]   [facility] 3NW-A Attending Maria C Clark MD   Hosp Day # 21 PCP Ryan Mortimer, MD     Subjective:    Patient tolerating thickene (overactive bladder)     Urge incontinence of urine     Primary osteoarthritis involving multiple joints     Gait instability     Neuropathic pain     Essential hypertension     Primary hypercoagulable state ([facility] [address] [D68.59]     Abnormal CXR     Coronary art

## 2020-01-06 NOTE — PLAN OF CARE
Problem: GASTROINTESTINAL - ADULT  Goal: Maintains or returns to baseline bowel function  Description  INTERVENTIONS:  - Assess bowel function  - Maintain adequate hydration with IV or PO as ordered and tolerated  - Evaluate effectiveness of GI medicatio instruct to report SOB or any respiratory difficulty  - Respiratory Therapy support as indicated  - Manage/alleviate anxiety  - Monitor for signs/symptoms of CO2 retention  Outcome: Progressing   Pt alert, oriented to person only.  Confused to time & situat

## 2020-01-06 NOTE — RESPIRATORY THERAPY NOTE
SMITA - Equipment Use Daily Summary:  · Set Mode CPAP  · Usage in hours: 2  · 90% Pressure (EPAP) level: 7  · 90% Insp Pressure (IPAP):  7  · AHI: 10  · Supplemental Oxygen:  · Comments:

## 2020-01-06 NOTE — PROGRESS NOTES
[facility] Cardiology  Progress Note    Corbymariah Cain Patient Status:  Inpatient    1955 MRN [id number]   [facility] 3NW-A Attending Arnulfo Ge, [address] [facility] Day # 21 PCP Juan العلي MD       Subjective:      Awake, 15 11 10 10   CREATSERUM 1.12*  --  1.08* 1.10* 0.96 0.97 0.90   CA 8.7  --  8.8 8.9 9.0 9.2 9.1   MG 1.8  --  1.8  --  1.7 1.7 1.8   PHOS 2.6  --  2.9  --  3.0 3.4 3.3   *  --  101* 117* 118* 116* 117*    < > = values in this interval not displayed ileostomy 12/28/19  Peritonitis  Per IM, ID, surgery    Plan:    Per surgery. Mobilize.       Jose Luis Colin

## 2020-01-06 NOTE — PROGRESS NOTES
NEUROLOGY PROGRESS NOTE     SUBJECTIVE:     Interval History: No events reported overnight. Pain in right shoulder is better but still reports it is present and limiting her movement.     OBJECTIVE   Temp:  [98 °F (36.7 °C)-98.6 °F (37 °C)] 98 °F (36.7 °C) 2. 1* 2.1*     Scheduled Meds:  • pantoprazole (PROTONIX) IV push  40 mg Intravenous Q24H   • Normal Saline Flush  10 mL Intravenous Q12H   • meropenem  500 mg Intravenous Q8H   • Lithium Carbonate  150 mg Oral Nightly   • Heparin Sodium (Porcine)  5,000 Un consulted for further evaluation of altered mental status. Etiology of altered mental status likely multifactorial from lab abnormalities (LESTER, hypoxia), elevated lithium level and polypharmacy.  MRI brain and EEG with no signs of underlying seizure Aniket Palacio

## 2020-01-06 NOTE — PROGRESS NOTES
[address] [facility]  TEL: [phone]  FAX: [phone]    Pau Garcia Patient Status:  Inpatient    1955 MRN [id number]   [facility] 4SW-A Attending Jt Flores MD   [facility] Day # 21 PCP Lisa Rosado Encounter on 12/14/19   1.  BLOOD CULTURE     Status: None    Collection Time: 12/28/19  6:06 PM   Result Value Ref Range    Blood Culture Result No Growth 5 Days N/A         Radiology:  Reviewed    A/P    1) perforated bowel with peritonitis  - s/p  Exp la

## 2020-01-06 NOTE — PLAN OF CARE
PT is alert and orientated to person, confused. Neuro check q 4. [place], dentures, glasses. SMITA, does not use her cpap. Tele, NSR. Incontinent, purwick and brief in place. Pain controlled with tylenol. Up to chair with sit to stand. Ileostomy LLQ mucous plug. mobility and gait  - Educate and engage patient/family in tolerated activity level and precautions  - Recommend use of  RW for transfers and ambulation   Outcome: Progressing     Problem: Impaired Cognition  Goal: Patient will exhibit improved attention, t (undernourished)  Description  INTERVENTIONS:  - Monitor percentage of each meal consumed  - Identify factors contributing to decreased intake, treat as appropriate  - Assist with meals as needed  - Monitor I&O, WT and lab values  - Obtain nutritional cons difficulty  - Respiratory Therapy support as indicated  - Manage/alleviate anxiety  - Monitor for signs/symptoms of CO2 retention  Outcome: Progressing

## 2020-01-06 NOTE — PROGRESS NOTES
[facility]    Nephrology Progress Note    Donny Cain Attending:  Mike Conteh MD       SUBJECTIVE:     Sleepy but answering questions appropriately  Has some abdominal tenderness     PHYSICAL EXAM:     Vital Signs: /78 (BP Location: 91.2 01/06/2020    MCH 29.6 01/06/2020    MCHC 32.4 01/06/2020    RDW 13.2 01/06/2020    NEPRELIM 7.46 01/04/2020    NEPERCENT 77.4 01/04/2020    LYPERCENT 13.0 01/04/2020    MOPERCENT 5.4 01/04/2020    EOPERCENT 3.0 01/04/2020    BAPERCENT 0.3 01/04/2020 Intravenous, Q2H PRN    Or  morphINE sulfate (PF) 4 MG/ML injection 6 mg, 6 mg, Intravenous, Q2H PRN  acetaminophen (TYLENOL) tab 650 mg, 650 mg, Oral, Q6H PRN    Or  acetaminophen (TYLENOL) 160 MG/5ML oral liquid 650 mg, 650 mg, Oral, Q6H PRN    Or  aceta resume amiloride     Bipolar disorder:  -- lithium restarted at lower dose, monitor closely     AMS:  -- per neurology     Cecum perforation and peritonitis:  -- s/p R hemicolectomy and ileostomy 12/28  -- bloody NGT output noted --> PPI  -- on meropenem

## 2020-01-06 NOTE — DIETARY MALNUTRITION NOTE
[facility]    NUTRITION ASSESSMENT    Pt meets moderate malnutrition criteria. NUTRITION DIAGNOSIS/PROBLEM:    Inadequate oral intake related to decreased ability to consume sufficient energy as evidenced by poor po intakes per intake record.  - con Dariel Amy is not being used consistently, RD changed order to include Ensure Clear once daily for a thinner consistency. Loose stool noted. No new wt recorded.    If po intake is not improved may consider alternative nutrition support if aggressive care is pur

## 2020-01-07 NOTE — PLAN OF CARE
Problem: Impaired Swallowing  Goal: Minimize aspiration risk  Description  Interventions: Recommend 1:1 supervision     - Patient should be alert and upright for all feedings (90 degrees preferred)  - Offer solids and liquids at a slow rate  - Pt benefit

## 2020-01-07 NOTE — OCCUPATIONAL THERAPY NOTE
OCCUPATIONAL THERAPY TREATMENT NOTE - INPATIENT     Room Number: 660/058-J  Session: 3  Number of Visits to Meet Established Goals: 7    Presenting Problem: acute encephalopathy; bowel perforation s/p ex-lap, right hemicolectomy, ileostomy, mucuous fistula DEFICIENCY   • Hx of diseases NEC     CHRONIC CONSTIPATION   • Incontinence    • Muscle weakness    • Neuropathy     bilateral feet   • Obesity, unspecified    • Osteoarthrosis, unspecified whether generalized or localized, unspecified site    • Sleep apne TRANSFER ASSESSMENT  Supine to Sit : Moderate assistance  Sit to Stand: Maximum assistance    Skilled Therapy Provided: Patient and family educated on OT role, goals, plan of care, recommendations and safety.   Patient alert, oriented to self and hospital. Treatment Plan: Balance activities; ADL training;Energy conservation/work simplification techniques; Functional transfer training;UE strengthening/ROM; Endurance training;Cognitive reorientation;Patient/Family education;Patient/Family training;Equipment eval/

## 2020-01-07 NOTE — PROGRESS NOTES
Vss. Pt alert to person and place but forgetful at times. Pt pleasant this am. Pt on ra with 02 sats wnl. Lungs cta. Pt denies difficulty breathing or sob. Pt denies chest pain. Denies n/v. Tolerating pureed, nectar thick full liquids without difficulty.  P

## 2020-01-07 NOTE — PROGRESS NOTES
[facility]  Progress Note    Rocky Nielsen Patient Status:  Inpatient    1955 MRN [id number]   [facility] 3NW-A Attending Alida Grissom MD   [facility] Day # 21 PCP Jeanne Rosas MD     Subjective:    Patient tolerating full Curlene Peppers dependence with nicotine-induced disorder     Central sleep apnea in conditions classified elsewhere(327.27)     OAB (overactive bladder)     Urge incontinence of urine     Primary osteoarthritis involving multiple joints     Gait instability     Neuropath

## 2020-01-07 NOTE — PROGRESS NOTES
[facility]acy Note: Route Optimization for Pantoprazole (PROTONIX)    Patient is currently on Pantoprazole (PROTONIX) 40 mg IV every 24 hours.    The patient meets the criteria to convert to the oral equivalent as established by the IV to Oral conversion pro

## 2020-01-07 NOTE — PROGRESS NOTES
Min [address] [place] [address] Cardiology  Progress Note    Jesus Arambula Patient Status:  Inpatient    1955 MRN [id number]   [facility] 3NW-A Attending Maggi Sun, [address] [facility] Day # 21 PCP Betty Nieto MD       Subjective:      Susannah Ambriz 8.8 8.9 9.0 9.2 9.1 9.4   MG 1.8  --  1.7 1.7 1.8 1.7   PHOS 2.9  --  3.0 3.4 3.3 3.2   * 117* 118* 116* 117* 105*       Recent Labs   Lab 01/03/20  0514 01/04/20  0608 01/05/20  0533 01/06/20  0539 01/07/20  0558   ALB 2.1* 2.1* 2.1* 2.1* 2.2* peripherally.     Nash Quiroga

## 2020-01-07 NOTE — PROGRESS NOTES
Patient is alert and oriented to self and location, unclear of several of the details of her hospital stay. Patient's right shoulder appears to be having weakness and causing pain, hand strength is intact. IVF per order. Purewick in place.  Colostomy draini

## 2020-01-07 NOTE — PROGRESS NOTES
AU[facility] Hospitalist Progress Note     [facility]      SUBJECTIVE:  Pain ok with PO meds today  Having some cough and SOB  Tolerated low residue diet    OBJECTIVE:  Temp:  [98.1 °F (36.7 °C)-99.5 °F (37.5 °C)] 98.8 °F (37.1 °C)  Pulse:  [83-93] 88  Resp: 12/23/2019  PROCEDURE:  XR ABDOMEN (1 VIEW) (CPT=74018)  INDICATIONS:  abd distension, sbo  COMPARISON:  [facility] , XR, XR CHEST AP/PA (1 VIEW) (CPT=71045), 12/22/2019, 12:21. [facility] , XR, XR CHEST AP PORTABLE  (CPT=71045), 12/23/2019, 20:40.   [facility] , XR, Head (43525)    Result Date: 1/3/2020  PROCEDURE:  CT BRAIN OR HEAD (64232)  COMPARISON:  MARJAN CT, CT BRAIN OR HEAD (94349), 12/14/2019, 16:06. INDICATIONS:  right sided weakness  TECHNIQUE:  Noncontrast CT scanning is performed through the brain.  [facility] scanning is performed through the brain. Dose reduction techniques were used. Dose information is transmitted to the [facility] [employer] of Radiology) NRDR ([address]) which includes the Dose Index Registry.   PATIENT STATED HISTORY increased T2 signal within the left mastoid air cells, along with fluid within the right side of the sphenoid sinus, these features are stable since the prior. CONCLUSION:  When compared to the prior examination, stable findings in the brain.   No de thickening within the floor of the right sphenoid sinus. Dictated by: Kim Leon DO on 12/16/2019 at 22:28     Approved by:  Kim Leon DO on 12/16/2019 at 22:31          Us Venous Doppler Arm Right - Diag Img (cpt=93971)    Result Date: 1/4/2020  P PATIENT STATED HISTORY: (As transcribed by Technologist)  ELEVATED WBC    FINDINGS:  Lung bases demonstrate mild basilar atelectasis. Motion artifact limits evaluation of the lungs.   Please note the exam is somewhat limited without intravenous or oral con particularly in the region of the cecum. Evaluation is limited without intravenous or oral contrast.  Consider imaging with oral and IV contrast or direct visualization.   There is a rounded lesion centered on the L1 vertebral body which is new from the ex Images were obtained both before and after administration of intravenous gadolinium. PATIENT STATED HISTORY:(As transcribed by Technologist)  Patient has resolving encephalopathy, right arm weakness.    CONTRAST USED:  20 mL of Dotarem  FINDINGS:   Some m ETT placement  PATIENT STATED HISTORY: (As transcribed by Technologist)  Adjustment of ET tube        CONCLUSION:    Endotracheal tube 4 cm above the carinal.  Mild elevation left diaphragm stable.   No CHF, sign of pneumonia, pleural effusion or pneumotho 12/14/2019  PROCEDURE:  XR CHEST AP PORTABLE  (CPT=71045)  TECHNIQUE:  AP chest radiograph was obtained. COMPARISON:  [facility] , XR, XR CHEST AP PORTABLE  (CPT=71045), 10/30/2019, 20:10.   INDICATIONS:  weakness  PATIENT STATED HISTORY: (As transcribed by Te night  [START ON 1/8/2020] Pantoprazole Sodium (PROTONIX) EC tab 40 mg, 40 mg, Oral, QAM AC  aMILoride HCl (MIDAMOR) tab 5 mg, 5 mg, Oral, Daily  LORazepam (ATIVAN) injection 0.5 mg, 0.5 mg, Intravenous, Q10 Min PRN  Normal Saline Flush 0.9 % injection 10 urinary incontinence, chronic pain on fentanyl patch/oxy. She was brought to the ED by family due to altered mental status. # R Arm pain/weakness  - US no dvt but superficial thrombus, iv removed  - MRI plexus without injury/abnormalities identified.  MR SSRI, dosing per psych     # Chronic pain with narcotic dependence   - apprec psych assistance  - PRN pain control -  norco added for PO option    Prophy:  DVT: heparin subQ  Deconditioning prevention: PT/OT    Dispo: admitted with lithium toxicity    Ques

## 2020-01-07 NOTE — PROGRESS NOTES
[facility] Dosing Service  Warfarin (Coumadin) Subsequent Dosing    Tj Watts is a 75year old female for whom pharmacy has been dosing warfarin (Coumadin).  Goal INR is 2-3    Recent Labs   Lab 01/03/20  0514 01/04/20  0608 01/05/20  0533 01/06/

## 2020-01-07 NOTE — PROGRESS NOTES
[facility]    Nephrology Progress Note    Jil Chisholm Attending:  Mehdi Smyth MD       SUBJECTIVE:     Sleepy, awakes to voice but not answering questions    PHYSICAL EXAM:     Vital Signs: /78 (BP Location: Right arm)   Pulse 86   T 01/07/2020    MCHC 30.9 (L) 01/07/2020    RDW 13.2 01/07/2020    NEPRELIM 7.46 01/04/2020    NEPERCENT 77.4 01/04/2020    LYPERCENT 13.0 01/04/2020    MOPERCENT 5.4 01/04/2020    EOPERCENT 3.0 01/04/2020    BAPERCENT 0.3 01/04/2020    NE 7.46 01/04/2020 Or  morphINE sulfate (PF) 4 MG/ML injection 6 mg, 6 mg, Intravenous, Q2H PRN  acetaminophen (TYLENOL) tab 650 mg, 650 mg, Oral, Q6H PRN    Or  acetaminophen (TYLENOL) 160 MG/5ML oral liquid 650 mg, 650 mg, Oral, Q6H PRN    Or  acetaminophen (TYLENOL) 650 M closely     AMS:  -- per neurology     Cecum perforation and peritonitis:  -- s/p R hemicolectomy and ileostomy 12/28  -- bloody NGT output noted --> PPI was started  -- on meropenem           Thank you for allowing me to participate in the care of this pa

## 2020-01-07 NOTE — SLP NOTE
SPEECH DAILY NOTE - INPATIENT    ASSESSMENT & PLAN   ASSESSMENT  Pt seen for dysphagia tx to assess tolerance with recommended diet, ensure proper utilization of aspiration precautions and provide pt/family education. Patient alert and up in chair.   She h rehabilitation    Treatment Plan  Treatment Plan/Recommendations: Dysphagia therapy    Interdisciplinary Communication: Discussed with RN            GOALS  Goal #1 The patient will tolerate soft consistency and thin liquids without overt signs or symptoms

## 2020-01-07 NOTE — RESPIRATORY THERAPY NOTE
SMITA - Equipment Use Daily Summary:                  . Set Mode: CPAP                . Usage in hours: 3:12                . 90% Pressure (EPAP) level: 7                . 90% Insp. Pressure (IPAP): Olya Villavicencio AHI: 6.9                .  Supplemental Oxyg

## 2020-01-07 NOTE — PHYSICAL THERAPY NOTE
PHYSICAL THERAPY TREATMENT NOTE - INPATIENT    Room Number: 495/979-B     Session: 2   Number of Visits to Meet Established Goals: 5     History related to current admission: Pt admitted on 12/14/19 with AMS and weakness.  Pt has a medical history to inclu LAPAROTOMY N/A 12/28/2019    Performed by Anais Dallas MD at [facility]   • MOUTH ODONTECTOMY N/A 6/12/2015    Performed by Magali Durant DDS at [facility] MAIN OR   • OTHER Bilateral 1969    Knee caps removed   • OTHER  2004    All upper teeth removal   • OT FIM distance - otherwise Min A x 2)  Distance (ft): 2 - 8  Assistive Device: Rolling walker  Pattern: Shuffle  Stoop/Curb Assistance: Not tested  Comment : Above FIM scores are defined per dept policy    Skilled Therapy Provided:     Pt presented in supine patient may achieve highest functional independence/return to baseline. DISCHARGE RECOMMENDATIONS  PT Discharge Recommendations: Sub-acute rehabilitation     PLAN  PT Treatment Plan: Bed mobility; Patient education; Family education;Gait training;Stair

## 2020-01-07 NOTE — PLAN OF CARE
Problem: Patient/Family Goals  Goal: Patient/Family Long Term Goal  Description  Patient's Long Term Goal: Return to previous mental status    Interventions:  - Take the least amount of pain meds needed.   -  - See additional Care Plan goals for specific delirium  Description  Interventions:  - Encourage use of hearing aids, eye glasses  - Promote highest level of mobility daily  - Provide frequent reorientation  - Promote wakefulness i.e. lights on, blinds open  - Promote sleep, encourage patient's normal Respiratory Therapy support as indicated  - Manage/alleviate anxiety  - Monitor for signs/symptoms of CO2 retention  Outcome: Progressing     Problem: Impaired Functional Mobility  Goal: Achieve highest/safest level of mobility/gait  Description  Colton Cevallos No nausea noted. Right ileostomy intact, +stool, left side mucous plus/fistula? To bag with soft brown output. Purewick in place, clear yellow urine. Midline incision staples clean dry and intact. Will monitor.

## 2020-01-08 NOTE — SLP NOTE
SPEECH DAILY NOTE - INPATIENT    ASSESSMENT & PLAN   ASSESSMENT  Pt seen for dysphagia tx to assess tolerance with recommended diet, ensure proper utilization of aspiration precautions and provide pt/family education.   Patient alert and up in bed, eating b 2 of 3    If you have any questions, please contact Iraida Davis 87 CCC-SLP  Pager [phone]

## 2020-01-08 NOTE — PROGRESS NOTES
GH[facility] Hospitalist Progress Note     [facility]      SUBJECTIVE:  Doing well  PO intake improving  Pain managed    OBJECTIVE:  Temp:  [98 °F (36.7 °C)-99.3 °F (37.4 °C)] 98 °F (36.7 °C)  Pulse:  [81-99] 93  Resp:  [18] 18  BP: (140-156)/(71-87) 156/87 in the last 168 hours.     Imaging:  Xr Abdomen (1 View) (cpt=74018)    Result Date: 12/23/2019  PROCEDURE:  XR ABDOMEN (1 VIEW) (CPT=74018)  INDICATIONS:  abd distension, sbo  COMPARISON:  [facility] , XR, XR CHEST AP/PA (1 VIEW) (CPT=71045), 12/22/2019, 12:21 Approved by: Matilde Barrow MD on 12/22/2019 at 12:43          Ct Brain Or Head (74756)    Result Date: 1/3/2020  PROCEDURE:  CT BRAIN OR HEAD (69066)  COMPARISON:  CADEN PHILLIP, CT BRAIN OR HEAD (46777), 12/14/2019, 16:06.   INDICATIONS:  right sided (30938), 11/06/2019, 16:31. INDICATIONS:  weakness  TECHNIQUE:  Noncontrast CT scanning is performed through the brain. Dose reduction techniques were used.  Dose information is transmitted to the  [facility] of Radiology) Joelle Slaughter [facility] are present within the internal carotid and basilar arteries. Redemonstration of increased T2 signal within the left mastoid air cells, along with fluid within the right side of the sphenoid sinus, these features are stable since the prior.         Jesse Taveras mastoid air cells concerning for potential mastoiditis. 4. Mild mucoperiosteal thickening within the floor of the right sphenoid sinus. Dictated by: Mercy Joseph DO on 12/16/2019 at 22:28     Approved by:  Mercy Joseph DO on 12/16/2019 at 22:31 ([address]) which includes the Dose Index Registry. PATIENT STATED HISTORY: (As transcribed by Technologist)  ELEVATED WBC    FINDINGS:  Lung bases demonstrate mild basilar atelectasis.   Motion artifact limits evaluation of the lung CONCLUSION:  Again noted is significant distention of the colon, particularly in the region of the cecum. Evaluation is limited without intravenous or oral contrast.  Consider imaging with oral and IV contrast or direct visualization.   There is a r parameters were utilized for visualization of suspected pathology. Images were obtained both before and after administration of intravenous gadolinium.    PATIENT STATED HISTORY:(As transcribed by Technologist)  Patient has resolving encephalopathy, right XR CHEST AP PORTABLE  (CPT=71045), 12/28/2019, 22:02.   INDICATIONS:  ETT placement  PATIENT STATED HISTORY: (As transcribed by Technologist)  Adjustment of ET tube        CONCLUSION:    Endotracheal tube 4 cm above the carinal.  Mild elevation left diaphra Xr Chest Ap Portable  (cpt=71045)    Result Date: 12/14/2019  PROCEDURE:  XR CHEST AP PORTABLE  (CPT=71045)  TECHNIQUE:  AP chest radiograph was obtained. COMPARISON:  [facility]ILLIP, XR CHEST AP PORTABLE  (CPT=71045), 10/30/2019, 20:10.   INDICATIONS:  w tablet by mouth every 6 (six) hours as needed.         Warfarin Sodium (COUMADIN) tab 3.5 mg, 3.5 mg, Oral, Once at night  Pantoprazole Sodium (PROTONIX) EC tab 40 mg, 40 mg, Oral, QAM AC  aMILoride HCl (MIDAMOR) tab 5 mg, 5 mg, Oral, Daily  LORazepam (ATIV obesity, tobacco use, HTN, neuropathy, bipolar on lithium, depression, lumbar spinal stenosis, urinary incontinence, chronic pain on fentanyl patch/oxy. She was brought to the ED by family due to altered mental status.     # R Arm pain/weakness  - US no dvt push IS  - no wheezing on exam  - improved/ CXR personally reviewed without signs of PNA     # SMITA   - CPAP per protocol     # Depression  - SSRI, dosing per psych     # Chronic pain with narcotic dependence   - apprec psych assistance  - PRN pain control

## 2020-01-08 NOTE — CM/SW NOTE
Updates sent to [facility] via [place] with request they confirm that pt can still be accepted for admission. Possible DC this week. Await response. / to remain available for support and/or discharge planning.      [facility] at [facility]

## 2020-01-08 NOTE — PLAN OF CARE
Problem: Patient/Family Goals  Goal: Patient/Family Long Term Goal  Description  Patient's Long Term Goal: Return to previous mental status    Interventions:  - Take the least amount of pain meds needed.   -  - See additional Care Plan goals for specific Impaired Cognition  Goal: Patient will exhibit improved attention, thought processing and/or memory  Description  Interventions:    1/8/2020 0347 by Dorian Rome RN  Outcome: Progressing  1/8/2020 0309 by Dorian Rome RN  Outcome: Progressing     Problem consult as needed  - Instruct patient on self management of diabetes  1/8/2020 0347 by Olga Turner RN  Outcome: Progressing  1/8/2020 0309 by Olga Turner RN  Outcome: Progressing     Problem: RESPIRATORY - ADULT  Goal: Achieves optimal ventilation and medication profile  1/8/2020 0347 by Mani Hernandez RN  Outcome: Progressing  1/8/2020 0309 by Mani Hernandez RN  Outcome: Progressing  Goal: Maintains adequate nutritional intake (undernourished)  Description  INTERVENTIONS:  - Monitor percentage of each me

## 2020-01-08 NOTE — PROGRESS NOTES
[facility]    Nephrology Progress Note    Dana Escalona Attending:  Bev Rodriguez MD       SUBJECTIVE:     Sitting in chair, no complaints  No leg swelling, breathing ok  No urinary complaints    PHYSICAL EXAM:     Vital Signs: /84 (BP L 01/08/2020    HCT 39.7 01/08/2020    .0 01/08/2020    .0 01/08/2020    MPV 10.1 (H) 12/16/2011    MCV 91.1 01/08/2020    MCV 91.1 01/08/2020    MCH 29.1 01/08/2020    MCH 29.1 01/08/2020    MCHC 32.0 01/08/2020    MCHC 32.0 01/08/2020    RDW 5,000 Units, 5,000 Units, Subcutaneous, Q8H [address]  dextrose 5% infusion, , Intravenous, Continuous  acetaminophen (TYLENOL) tab 650 mg, 650 mg, Oral, Q6H PRN    Or  acetaminophen (TYLENOL) 160 MG/5ML oral liquid 650 mg, 650 mg, Oral, Q6H PRN    Or  acetamino closely     AMS:  -- per neurology     Cecum perforation and peritonitis:  -- s/p R hemicolectomy and ileostomy 12/28  -- bloody NGT output noted --> PPI was started  -- on meropenem      Thank you for allowing me to participate in the care of this patient

## 2020-01-08 NOTE — PROGRESS NOTES
Min [address] [place] [address] Cardiology  Progress Note    Chandler Mere Patient Status:  Inpatient    1955 MRN [id number]   [facility] 3NW-A Attending Joanna Valdez, [address] [facility] Day # 25 PCP Kathy Smart MD       Subjective:      Hakan Joseph > 11 10 10 10 11   CREATSERUM 1.08*   < > 0.96 0.97 0.90 1.01 1.04*   CA 8.8   < > 9.0 9.2 9.1 9.4 9.4   MG 1.8  --  1.7 1.7 1.8 1.7 1.7   PHOS 2.9  --  3.0 3.4 3.3 3.2  --    *   < > 118* 116* 117* 105* 113*    < > = values in this interval not dis on admission    Perforated bowel s/p ex lap, right hemicolectomy, ileostomy 12/28/19  Peritonitis  Per IM, ID, surgery    Plan:    Per surgery, IM  No CV follow up. Mobilize.       Jose Luis Colin

## 2020-01-08 NOTE — PROGRESS NOTES
[facility]  Progress Note    [place] Patient Status:  Inpatient    1955 MRN [id number]   [facility] 3NW-A Attending Es Ferrell MD   [facility] Day # 25 PCP Michaela Barakat MD     Subjective:    Patient tolerating PO diet, conditions classified elsewhere(327.27)     OAB (overactive bladder)     Urge incontinence of urine     Primary osteoarthritis involving multiple joints     Gait instability     Neuropathic pain     Essential hypertension     Primary hypercoagulable state

## 2020-01-08 NOTE — PROGRESS NOTES
[address] [facility]  TEL: [phone]  FAX: [phone]    Sergey Mirian Patient Status:  Inpatient    1955 MRN [id number]   [facility] 4SW-A Attending Lynette Kohli MD   [facility] Day # 25 PCP Derick Jacobo 19.0* 20.0*       Microbiology    Reviewed in EMR,   Hospital Encounter on 12/14/19   1.  BLOOD CULTURE     Status: None    Collection Time: 12/28/19  6:06 PM   Result Value Ref Range    Blood Culture Result No Growth 5 Days N/A         Radiology:  Reviewed

## 2020-01-08 NOTE — PROGRESS NOTES
Vss. Pt alert to self and pleasant but forgetful at times. Pt on ra with 02 sats wnl. Lungs cta. Pt denies difficulty breathing or sob. Pt denies n/v. Tolerating regular diet without difficulty. Pt passing flatus and loose stool through right ileostomy.  Ronny Frank

## 2020-01-08 NOTE — CONSULTS
Pharmacy Dosing Service: Warfarin (Coumadin)  Haily Davison is a 75year old female for whom pharmacy has been dosing warfarin (Coumadin).  Goal INR is 2-3    Recent Labs   Lab 01/04/20  6615 01/05/20  0533 01/06/20  0539 01/07/20  0558 01/08/20  0540

## 2020-01-08 NOTE — PROGRESS NOTES
[address] [facility]  TEL: [phone]  FAX: [phone]    Jayme Ochoa Patient Status:  Inpatient    1955 MRN [id number]   [facility] 4SW-A Attending Angie Souza MD   [facility] Day # 21 PCP Anthony Chao 5 Days N/A         Radiology:  Reviewed    A/P    1) perforated bowel with peritonitis  - s/p  Exp lap with R hemicolectomy, ileostomy and mucus fistula on 12/28  - better clinically, wbc trending down and no fevers, abd exam benign  - will have her comple

## 2020-01-08 NOTE — RESPIRATORY THERAPY NOTE
SMITA Equipment Usage Summary :            Set Mode : CPAP W FLEX          Usage in Hours:00;30          90% Pressure (EPAP) : 5           90% Insp Pressure (IPAP);           AHI : 1.9          Supplemental Oxygen : 2     LPM          Patient only used the cp

## 2020-01-09 NOTE — PLAN OF CARE
Problem: Patient/Family Goals  Goal: Patient/Family Long Term Goal  Description  Patient's Long Term Goal: Return to previous mental status    Interventions:  - Take the least amount of pain meds needed.   -  - See additional Care Plan goals for specific delirium  Description  Interventions:  - Encourage use of hearing aids, eye glasses  - Promote highest level of mobility daily  - Provide frequent reorientation  - Promote wakefulness i.e. lights on, blinds open  - Promote sleep, encourage patient's normal Respiratory Therapy support as indicated  - Manage/alleviate anxiety  - Monitor for signs/symptoms of CO2 retention  Outcome: Progressing     Problem: Impaired Functional Mobility  Goal: Achieve highest/safest level of mobility/gait  Description  Ed Bradley assist. Pt is resting, bed alarm on, call light in reach, will continue to monitor.

## 2020-01-09 NOTE — DIETARY MALNUTRITION NOTE
[facility]    NUTRITION ASSESSMENT    Pt meets moderate malnutrition criteria. NUTRITION DIAGNOSIS/PROBLEM:    Inadequate oral intake related to decreased ability to consume sufficient energy as evidenced by poor po intakes per intake record.  - con HP at bedside untouched. Abd distended, No BM noted. Improved mentation, but still confused. Hypernatremic still, D5 ordered. 12/23: Per pt's  intake remains poor. Today pt took a few bites of yogurt and sips of juice.  Ensure Thetoribio Skinner is not being skin breakdown    MEDICATIONS:  noted    LABS:  Na 142    Pt is at moderate nutrition risk    FOLLOW-UP DATE: 1/14/2020    Prasanna Menjivar RDN  Clinical Dietitian  Pager [phone]

## 2020-01-09 NOTE — CM/SW NOTE
Spoke with Radha in admissions at [facility] [phone] who did confirm that they can accept pt upon d/c.

## 2020-01-09 NOTE — SLP NOTE
SPEECH DAILY NOTE - INPATIENT    ASSESSMENT & PLAN   ASSESSMENT  Pt seen for dysphagia tx to assess tolerance with recommended diet, ensure proper utilization of aspiration precautions and provide pt/family education. No family or visitors present.  Patient signs or symptoms of aspiration with 90 % accuracy over 1-2 session(s).   MET   Goal #2 The patient/family/caregiver will demonstrate understanding and implementation of aspiration precautions and swallow strategies independently over 1-2 session(s).    MET

## 2020-01-09 NOTE — PLAN OF CARE
Problem: Impaired Swallowing  Goal: Minimize aspiration risk  Description  Interventions:   - Patient should be alert and upright for all feedings (90 degrees preferred)  - Offer solids and liquids at a slow rate  - Pt benefits from single sip at a time

## 2020-01-09 NOTE — PROGRESS NOTES
[facility]  Progress Note    Dana Escalona Patient Status:  Inpatient    1955 MRN [id number]   [facility] 3NW-A Attending Gilberto Najjar, MD   [facility] Day # 21 PCP Nano Lozano MD     Subjective:    Patient tolerating PO diet, ev sleep apnea in conditions classified elsewhere(327.27)     OAB (overactive bladder)     Urge incontinence of urine     Primary osteoarthritis involving multiple joints     Gait instability     Neuropathic pain     Essential hypertension     Primary hyperco

## 2020-01-09 NOTE — PROGRESS NOTES
[facility]    Nephrology Progress Note    Kali Campuzano Attending:  Cathy Alvarado MD       SUBJECTIVE:     She is drinking more fluids  Some abdominal incision site pain noted    PHYSICAL EXAM:     Vital Signs: /69 (BP Location: Right arm) 76.8 01/08/2020    LYPERCENT 12.5 01/08/2020    MOPERCENT 5.6 01/08/2020    EOPERCENT 4.2 01/08/2020    BAPERCENT 0.4 01/08/2020    NE 6.27 01/08/2020    LYMABS 1.02 01/08/2020    MOABSO 0.46 01/08/2020    EOABSO 0.34 01/08/2020    BAABSO 0.03 01/08/2020 mg, 100 mg, Oral, Nightly  lidocaine-menthol 4-1 % 1 patch, 1 patch, Transdermal, Daily  amLODIPine Besylate (NORVASC) tab 10 mg, 10 mg, Oral, Daily  Labetalol HCl (TRANDATE) injection 10 mg, 10 mg, Intravenous, Q6H PRN  nicotine (NICODERM CQ) 7 MG/24HR 1 concerns.         Shara Lozada MD  [address] Nephrology  [address]  [address] [place] [address]  Sabrina, [address]    1/9/2020  4:07 PM

## 2020-01-09 NOTE — PROGRESS NOTES
[facility] Cardiology  Progress Note    Gardenia Mcgowan Patient Status:  Inpatient    1955 MRN [id number]   [facility] 3NW-A Attending Talat Jernigan, [address] [facility] Day # 21 PCP Kaleb Murray MD       Subjective:      Possib 11 15   CREATSERUM 1.08*   < > 0.96 0.97 0.90 1.01 1.04* 0.97   CA 8.8   < > 9.0 9.2 9.1 9.4 9.4 9.2   MG 1.8  --  1.7 1.7 1.8 1.7 1.7 1.7   PHOS 2.9  --  3.0 3.4 3.3 3.2  --   --    *   < > 118* 116* 117* 105* 113* 121*    < > = values in this inte bowel s/p ex lap, right hemicolectomy, ileostomy 12/28/19  Peritonitis  Per IM, ID, surgery    Plan:    Per surgery, IM  No CV follow up. Mobilize. Will sign off.       Priti Heller

## 2020-01-09 NOTE — PROGRESS NOTES
[address] [place] [address]  TEL: [phone]  FAX: [phone]    Dana Escalona Patient Status:  Inpatient    1955 MRN [id number]   [facility] 4SW-A Attending Chelle Bryan MD   [address] [facility] Day # 23 PCP Jay Wolfe 118* 118* 114* 114* 116*   CO2 19.0* 19.0* 19.0* 20.0* 19.0*       Microbiology    Reviewed in EMR,   Hospital Encounter on 12/14/19   1.  BLOOD CULTURE     Status: None    Collection Time: 12/28/19  6:06 PM   Result Value Ref Range    Blood Culture Result

## 2020-01-09 NOTE — PROGRESS NOTES
[facility] Dosing Service  Warfarin (Coumadin) Subsequent Dosing    Jesus Arambula is a 75year old female for whom pharmacy has been dosing warfarin (Coumadin).  Goal INR is 2-3    Recent Labs   Lab 01/05/20  0533 01/06/20  0539 01/07/20  0558 01/08/

## 2020-01-09 NOTE — RESPIRATORY THERAPY NOTE
SMITA Equipment Usage Summary :            Set Mode : CPAP W FLEX          Usage in Hours:1;19          90% Pressure (EPAP) : 5           90% Insp Pressure (IPAP);           AHI : 4.5          Supplemental Oxygen :      LPM

## 2020-01-10 NOTE — PLAN OF CARE
Problem: Patient/Family Goals  Goal: Patient/Family Long Term Goal  Description  Patient's Long Term Goal:   Return to previous mental status    Interventions:  - Take the least amount of pain meds needed.   -  - See additional Care Plan goals for specifi Encourage use of hearing aids, eye glasses  - Promote highest level of mobility daily  - Provide frequent reorientation  - Promote wakefulness i.e. lights on, blinds open  - Promote sleep, encourage patient's normal rest cycle i.e. lights off, TV off, mini Manage/alleviate anxiety  - Monitor for signs/symptoms of CO2 retention  Outcome: Progressing     Problem: Impaired Functional Mobility  Goal: Achieve highest/safest level of mobility/gait  Description  Interventions:  - Assess patient's functional ability

## 2020-01-10 NOTE — PROGRESS NOTES
[facility]    Nephrology Progress Note    Donald Morris Attending:  Naty Ceballos MD       SUBJECTIVE:     Has no issue drinking lots of fluids  Urinary incontinence noted  Some abdominal ttp      PHYSICAL EXAM:     Vital Signs: /72 (BP Loc 01/10/2020    NEPRELIM 6.27 01/08/2020    NEPERCENT 76.8 01/08/2020    LYPERCENT 12.5 01/08/2020    MOPERCENT 5.6 01/08/2020    EOPERCENT 4.2 01/08/2020    BAPERCENT 0.4 01/08/2020    NE 6.27 01/08/2020    LYMABS 1.02 01/08/2020    MOABSO 0.46 01/08/2020 mg, 100 mg, Oral, Nightly  lidocaine-menthol 4-1 % 1 patch, 1 patch, Transdermal, Daily  amLODIPine Besylate (NORVASC) tab 10 mg, 10 mg, Oral, Daily  Labetalol HCl (TRANDATE) injection 10 mg, 10 mg, Intravenous, Q6H PRN  nicotine (NICODERM CQ) 7 MG/24HR 1 few weeks with me in the office - our office will call patient to schedule. D/w nursing. Thank you for allowing me to participate in the care of this patient. Please do not hesitate to call with questions or concerns.         MD Dereck Domingo

## 2020-01-10 NOTE — CONSULTS
Pharmacy Dosing Service: Warfarin (Coumadin)  Whit Manzano is a 75year old female for whom pharmacy has been dosing warfarin (Coumadin).  Goal INR is 2-3    Recent Labs   Lab 01/06/20  0539 01/07/20  0558 01/08/20  0540 01/09/20  0513 01/10/20  0530

## 2020-01-10 NOTE — PROGRESS NOTES
REPORT CALLED TO LOUIE AT [facility] AND COPY OF DISCHARGED SUMMARY FAXED TO THE [facility], ALSO COPY OF DISCHARGE SUMMARY GIVEN TO DAUGHTER AND REVIEWED WITH DAUGHTER AND PT,

## 2020-01-10 NOTE — RESPIRATORY THERAPY NOTE
SMITA Equipment Usage Summary :            Set Mode : CPAP W FLEX          Usage in Hours:2;30          90% Pressure (EPAP) : 5           90% Insp Pressure (IPAP);           AHI : 2          Supplemental Oxygen :      LPM

## 2020-01-10 NOTE — PROGRESS NOTES
[address] [place] [address]  TEL: [phone]  FAX: [phone]    Virgen Crew Patient Status:  Inpatient    1955 MRN [id number]   [facility] 4SW-A Attending Jazzmine Lyon MD   [facility] Day # 24 PCP ePyton Wagner 4.3 4.4   * 118* 114* 114* 116* 114*   CO2 19.0* 19.0* 19.0* 20.0* 19.0* 20.0*       Microbiology    Reviewed in EMR,   Hospital Encounter on 12/14/19   1.  BLOOD CULTURE     Status: None    Collection Time: 12/28/19  6:06 PM   Result Value Ref Range

## 2020-01-10 NOTE — PLAN OF CARE
Problem: Patient/Family Goals  Goal: Patient/Family Long Term Goal  Description  Patient's Long Term Goal: Return to previous mental status    Interventions:  - Take the least amount of pain meds needed.   -  - See additional Care Plan goals for specific Delirium  Goal: Minimize duration of delirium  Description  Interventions:  - Encourage use of hearing aids, eye glasses  - Promote highest level of mobility daily  - Provide frequent reorientation  - Promote wakefulness i.e. lights on, blinds open  - Prom Assess and instruct to report SOB or any respiratory difficulty  - Respiratory Therapy support as indicated  - Manage/alleviate anxiety  - Monitor for signs/symptoms of CO2 retention  Outcome: Adequate for Discharge     Problem: Impaired Functional Mobilit FISTULA POUCH ON LEFT ABD CHANGED AND SCANT AMOUNT OF THICK BROWN STOOL NOTED, STOMA PINK, ILEOSTOMY POUCH CHANGED AND STOMA RED, SEMI LIQUID STOOL NOTED PER ILEOSTOMY, MIDLINE INCISION INTACT, STAPLES REMOVED AND STERITAPES APPLIED AND APPROXIMATED WELL,

## 2020-01-10 NOTE — PROGRESS NOTES
PT DAUGHTER STATES SHE DOES NOT HAVE UPPER DENTURES IN PLACE, PT CANNOT REMEMBER IF SHE HAD THEN TODAY, NO DENTURES NOTED THIS AM, PT'S DAUGHTER STATES SHE HAD THEM LAST EVENING FOR DINNER AND NOW NOT IN ROOM, SEARCHED ROOM, CALLED DIETARY AND UNABLE TO FI

## 2020-01-10 NOTE — CM/SW NOTE
Pt is ready for d/c today. Pt will go to [facility]. Called Rayne from [facility] to coordinate pt's d/c time. RN to call report to [phone]. THE [facility] is scheduled to  pt at 6:00pm tonight. PCS form completed and placed on chart.

## 2020-01-10 NOTE — OCCUPATIONAL THERAPY NOTE
OCCUPATIONAL THERAPY TREATMENT NOTE - INPATIENT     Room Number: 658/650-B  Session: 4  Number of Visits to Meet Established Goals: 7    Presenting Problem: acute encephalopathy; bowel perforation s/p ex-lap, right hemicolectomy, ileostomy, mucuous fistula DEFICIENCY   • Hx of diseases NEC     CHRONIC CONSTIPATION   • Incontinence    • Muscle weakness    • Neuropathy     bilateral feet   • Obesity, unspecified    • Osteoarthrosis, unspecified whether generalized or localized, unspecified site    • Sleep apne CL    FUNCTIONAL TRANSFER ASSESSMENT  Supine to Sit : Moderate assistance  Sit to Stand: Maximum assistance    Skilled Therapy Provided: Patient and family educated on OT role, goals, plan of care, recommendations and safety.   Patient alert, oriented to se strengthening/ROM; Endurance training;Cognitive reorientation;Patient/Family education;Patient/Family training;Equipment eval/education; Neuromuscluar reeducation; Fine motor coordination activities; Compensatory technique education  Rehab Potential : Good  Fr

## 2020-01-10 NOTE — PLAN OF CARE
Problem: Patient/Family Goals  Goal: Patient/Family Long Term Goal  Description  Patient's Long Term Goal: Return to previous mental status    Interventions:  - Take the least amount of pain meds needed.   -  - See additional Care Plan goals for specific Martina Borges RN  Outcome: Progressing       Problem: Impaired Communication  Goal: Patient will achieve maximal communication potential  Description  Interventions: Engage in conversation and social interaction with staff and family members.   1/9/202 changes in respiratory status  - Assess for changes in mentation and behavior  - Position to facilitate oxygenation and minimize respiratory effort  - Oxygen supplementation based on oxygen saturation or ABGs  - Provide Smoking Cessation handout, if applic

## 2020-01-11 NOTE — DISCHARGE SUMMARY
[facility] Discharge Summary    Patient ID:  Alexandra Chavez  [id number]  1 year old  6/29/1955    Admit date: 12/14/2019  Discharge date and time: 1/10/20  Attending Physician: Keshav Garcia MD  Primary Care Physician: Jennifer Lomeli MD ct and mri brain neg  - neuro and psych saw. eeg neg for seizure  - resolved by discharge     3) R Arm pain/weakness  - US no dvt but superficial thrombus, iv removed  - MRI plexus without injury/abnormalities identified.  MRI brain ok  - neuro saw  - PT/OT carvedilol 6.25 MG Tabs  Commonly known as:  COREG  Take 1 tablet (6.25 mg total) by mouth 2 (two) times daily with meals. HYDROcodone-acetaminophen 5-325 MG Tabs  Commonly known as:  NORCO  Take 1 tablet by mouth every 6 (six) hours as needed.   Note as:   VALIUM     Diclofenac Sodium 1 % Gel  Commonly known as:  VOLTAREN     ergocalciferol 1.25 MG (48217 UT) Caps  Commonly known as:  DRISDOL/VITAMIN D2     fentaNYL 25 MCG/HR Pt72  Commonly known as:  DURAGESIC     Fluticasone Propionate 50 MCG/ACT Susp (cpt=74018)    Result Date: 12/23/2019  PROCEDURE:  XR ABDOMEN (1 VIEW) (CPT=74018)  INDICATIONS:  abd distension, sbo  COMPARISON:  [facility] , XR, XR CHEST AP/PA (1 VIEW) (CPT=71045), 12/22/2019, 12:21.   [facility] , XR, XR CHEST AP PORTABLE  (CPT=71045), 12/23 12:43          Ct Brain Or Head (94543)    Result Date: 1/3/2020  PROCEDURE:  CT BRAIN OR HEAD (29100)  COMPARISON:  [facility] CT, CT BRAIN OR HEAD (81659), 12/14/2019, 16:06.   INDICATIONS:  right sided weakness  TECHNIQUE:  Noncontrast CT scanning is perfo TECHNIQUE:  Noncontrast CT scanning is performed through the brain. Dose reduction techniques were used.  Dose information is transmitted to the ACR [facility] [employer] [facility]) NRDR ([address]) which includes the Dose Index Regist arteries. Redemonstration of increased T2 signal within the left mastoid air cells, along with fluid within the right side of the sphenoid sinus, these features are stable since the prior.         CONCLUSION:  When compared to the prior examination, stable mastoiditis. 4. Mild mucoperiosteal thickening within the floor of the right sphenoid sinus. Dictated by: Radha Tejada DO on 12/16/2019 at 22:28     Approved by:  Radha Tejada DO on 12/16/2019 at 22:31          Us Venous Doppler Arm Right - Diag Img (micheline includes the Dose Index Registry. PATIENT STATED HISTORY: (As transcribed by Technologist)  ELEVATED WBC    FINDINGS:  Lung bases demonstrate mild basilar atelectasis. Motion artifact limits evaluation of the lungs.   Please note the exam is somewhat Lanre Reach significant distention of the colon, particularly in the region of the cecum. Evaluation is limited without intravenous or oral contrast.  Consider imaging with oral and IV contrast or direct visualization.   There is a rounded lesion centered on the L1 ve visualization of suspected pathology. Images were obtained both before and after administration of intravenous gadolinium. PATIENT STATED HISTORY:(As transcribed by Technologist)  Patient has resolving encephalopathy, right arm weakness.    CONTRAST USED ABDOMEN+PELVIS(CPT=74176), 12/24/2019, 15:27. [facility] , XR, XR CHEST AP PORTABLE  (CPT=71045), 12/29/2019, 1:42. [facility] , XR, XR CHEST AP PORTABLE  (CPT=71045), 12/28/2019, 22:02.   INDICATIONS:  shortness of breath  PATIENT STATED HISTORY: (As transcribed clear.  No sizable effusion or pneumothorax. Heart stable size.    Dictated by: Maciel Herrera MD on 12/28/2019 at 22:17     Approved by: Maciel Herrera MD on 12/28/2019 at 22:17          Xr Chest Ap Portable  (cpt=71045)    Result Date: 12/23/2019  PROC obstructive series of the abdomen and pelvis were obtained. COMPARISON:  [facility] , XR, XR ABDOMEN (1 VIEW) (CPT=74018), 12/23/2019, 20:43.   INDICATIONS:  abdominal distention  PATIENT STATED HISTORY: (As transcribed by Technologist)  Patient offered no add

## 2020-01-11 NOTE — PROGRESS NOTES
PT DISCHARGED TO [facility]ULANCE, FAMILY HERE FOR TRANSFER, DISCHARGE SUMMARY, RX FOR NORCO AND SUPPLIES FOR OSTOMY CARE GIVEN TO AMBULANCE PERSONNEL, [facility] NOTIFED OF TIME OF TRANSFER

## 2020-01-11 NOTE — CM/SW NOTE
01/11/20 0700   Discharge disposition   Expected discharge disposition Skilled Nurs   Name of [address] [facility]   Discharge transportation [facility] 1/10/2020

## 2020-01-13 NOTE — PROGRESS NOTES
Rj Metcalf Author: Wanda Nicole MD     1955 MRN [id number]   [facility]  Admission 19      Last Hospital Discharge 1/10/20 PCP Anabel [facility] of Discharge  [facility]        CC --ad[facility] to the [facility]    • Blood disorder     Protein S deficiency   • COPD (chronic obstructive pulmonary disease) (HCC)     CPAP at night   • Deep vein thrombosis (HCC)    • Depression    • Esophageal reflux    • Hearing impairment     hearing loss   • High blo OTHER (SEE COMMENTS)    Comment:Loss of vision  Lovenox [Enoxaparin]    RASH  Macrobid [Nitrofura*        Comment:SOB  Nitrofurantoin              Comment:Not sure  Other                   OTHER (SEE COMMENTS)    Comment:Steroid medication             C glands. Skin: No rashes, pruritus or skin changes,  Respiratory: Denies cough, wheezing or shortness of breath. CV: Denies chest pain, palpitations, orthopnea, PND or dizziness. Musculoskeletal: No joint pain, stiffness or swelling.   GI: No nausea, vomi 1. 8 1.7 1.7 1.7   PHOS 2.9  --  3.0 3.4 3.3 3.2  --   --    *   < > 118* 116* 117* 105* 113* 121*    < > = values in this interval not displayed.                 Recent Labs   Lab 01/03/20  0514 01/04/20  8767 01/05/20  0533 01/06/20  0539 01/07/20

## 2020-01-13 NOTE — PROGRESS NOTES
Vika Awadt  : 1955  Age 75year old  female patient is admitted to Facility: [facility] [employer].     [address] [place] [address] Drive date:  2019  Discharge date to [facility]:  1/10/2020  ELOS:   14 days  Anticipated discharge d NEC     CHRONIC PAIN (S/P DVTS)   • Hx of diseases NEC     PROTEIN S DEFICIENCY   • Hx of diseases NEC     CHRONIC CONSTIPATION   • Incontinence    • Muscle weakness    • Neuropathy     bilateral feet   • Obesity, unspecified    • Osteoarthrosis, unspecifi None      CURRENT MEDICATIONS   Current Outpatient Medications   Medication Sig Dispense Refill   • oxyCODONE-acetaminophen 5-325 MG Oral Tab Take by mouth every 6 (six) hours as needed for Pain.  1-2 tabs Q 6prn     • Lithium Carbonate 150 MG Oral Cap Take deficits  HENT: denies nasal congestion, sinus pain or sore throat; and hearing loss negative  RESPIRATORY: denies shortness of breath, wheezing or cough   CARDIOVASCULAR:denies chest pain, no palpitations , denies cough  GI: denies nausea, vomiting, const Results   Component Value Date    MG 1.5 (L) 01/10/2020     Lab Results   Component Value Date    PTP 28.7 (H) 01/13/2020    PT 23.7 (H) 04/15/2014    INR 2.51 (H) 01/13/2020       Lab Results   Component Value Date    WBC 6.9 01/13/2020    RBC 4.34 01/13/ all sleep    COPD  - no wheezing no SOB  - continue nebs duonebs prn  - albuterol HFA prn  - spiriva 2 puffs daily    Rhinitis  - atrovent nasal QID    Bipolar disorder  - continue medications  - psych eval as needed  - appears managed  - lithium 150 mg da

## 2020-01-13 NOTE — PROGRESS NOTES
Tiffany Chris Author: Catherine Addison MD     1955 MRN [id number]   [facility]  [facility]      Last Hospital Discharge 1/10/20 PCP Asaf [facility] of Discharge  [facility]       Patient [facility]

## 2020-01-21 NOTE — PROGRESS NOTES
Gm Shook, 6/29/1955, 75year old, female    Chief Complaint:  Patient presents with:   Follow - Up: altered mental status with lithium toxicity, bowel perforation with hemicolectomy, illeostomy and musious fistula placement  Lab Results  Weakness moist.  NECK: supple, non tender, FROM  BREAST: deferred  RESPIRATORY:clear, no crackles, no wheezing, no dyspnea, no cough, room air  CARDIOVASCULAR: RRR, S1 and S2, no murmurs, no edema  ABDOMEN:  normal active BS+, soft, nondistended; no organomegaly, n 12/20/2019  - ostomy care  - incision healing well  - surgery follow up on 1/22/2020 with Dr Luzmaria Epstein  - pain management acute on chronic on Norco now not helping, percocet 5/325 1-2 tabs q 6 prn ordered  - bowel management - ostomy output good  - toleratin

## 2020-01-24 NOTE — PROGRESS NOTES
Pietro Shipman, 6/29/1955, 75year old, female    Chief Complaint:  Patient presents with:   Follow - Up: altered mental status with lithium toxicity, bowel perforation with hemicolectomy, illeostomy and musious fistula placement  UTI  Lab Results  Pain is no nystagmus, no drainage from eyes  HENT: normocephalic; normal nose, no nasal drainage, mucous membranes pink, moist.  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

## 2020-01-27 NOTE — PROGRESS NOTES
Jil Chisholm, 6/29/1955, 75year old, female is being discharged from [address] [place] [address] at [address] [facility]    Date of Admission: 1/10/2020    Date of Discharge:1/27/2020                                 Admitting Diagnoses: EYES: sclera anicteric, conjunctiva normal; there is no nystagmus, no drainage from eyes  HENT: normocephalic; normal nose, no nasal drainage, mucous membranes pink, moist.  NECK: supple, non tender, FROM  BREAST: deferred  RESPIRATORY:clear, no crackles, BUN 17 01/27/2020    BUNCREA 12.9 01/27/2020    CREATSERUM 1.32 (H) 01/27/2020    ANIONGAP 9 01/27/2020    GFR 94 05/12/2015    GFRNAA 42 (L) 01/27/2020    GFRAA 48 (L) 01/27/2020    CA 9.9 01/27/2020    OSMOCALC 296 (H) 01/27/2020    ALKPHO 158 (H) 01/27 - surgery follow up on 1/26/2020 with Dr Lynne Marquez  - pain management acute on chronic on Norco now not helping, percocet 5/325 1-2 tabs q 6 prn ordered  - bowel management - ostomy output good  - tolerating diet, no nausea, no vomiting  PT/OT eval and treat Psychiatry Dr Darryn Borden has seen here, follow up as outpt  Pain management MD as outpt    Medication Reconciliation Completed:  Yes     spent in coordination of care in preparation for discharge.     Lai Tripp, JOELLE  1/27/2020  4:18 PM

## 2020-02-03 PROBLEM — Z90.49 S/P LEFT HEMICOLECTOMY: Status: ACTIVE | Noted: 2020-01-01

## 2020-02-03 PROBLEM — Z93.3 COLOSTOMY IN PLACE (HCC): Status: ACTIVE | Noted: 2020-01-01

## 2020-02-03 PROBLEM — Z93.4 SURGICALLY CREATED ABDOMINAL MUCOUS FISTULA (HCC): Status: ACTIVE | Noted: 2020-01-01

## 2020-02-03 PROBLEM — N18.30 CKD (CHRONIC KIDNEY DISEASE) STAGE 3, GFR 30-59 ML/MIN (HCC): Status: ACTIVE | Noted: 2020-01-01

## 2020-02-03 PROBLEM — K63.1 SPONTANEOUS PERFORATION OF COLON (HCC): Status: ACTIVE | Noted: 2020-01-01

## 2020-02-03 PROBLEM — N25.1 DIABETES INSIPIDUS, NEPHROGENIC (HCC): Status: ACTIVE | Noted: 2020-01-01

## 2020-02-25 PROBLEM — I82.432 ACUTE DEEP VEIN THROMBOSIS (DVT) OF POPLITEAL VEIN OF LEFT LOWER EXTREMITY (HCC): Status: ACTIVE | Noted: 2020-01-01

## 2020-04-13 PROBLEM — I82.402 DEEP VEIN THROMBOSIS (DVT) OF LEFT LOWER EXTREMITY (HCC): Status: ACTIVE | Noted: 2020-01-01

## 2020-04-13 PROBLEM — D12.6 TUBULAR ADENOMA OF COLON: Status: ACTIVE | Noted: 2020-01-01

## 2020-04-13 PROBLEM — G31.9 CEREBRAL ATROPHY (HCC): Status: ACTIVE | Noted: 2020-01-01

## 2020-04-28 PROBLEM — J96.91 RESPIRATORY FAILURE WITH HYPOXIA (HCC): Status: ACTIVE | Noted: 2020-01-01

## 2020-04-28 PROBLEM — J96.91 RESPIRATORY FAILURE WITH HYPOXIA, UNSPECIFIED CHRONICITY (HCC): Status: ACTIVE | Noted: 2020-01-01

## 2020-04-28 NOTE — PROGRESS NOTES
Pharmacy Note:  Stress Ulcer Prophylaxis Initialization        Chandler Severino is a 75year old female who meets criteria for the initiation of stress ulcer prophylaxis based on the presence of 1 major risk factor for stress ulcer development:  Will Linares

## 2020-04-28 NOTE — ED NOTES
Pt remains comfortable and stable. Minimal output from colostomy bag. Pt to CT with this RN, and RT. Pt on cardiac monitor and cont pulse ox. Abx completed. Personal belongings with pt including necklace.

## 2020-04-28 NOTE — ED PROVIDER NOTES
Patient Seen in: [facility] Emergency Department      History   Patient presents with:  Dyspnea SEMAJ SOB    Stated Complaint:     HPI    This is a 57-year-old female who arrives here with complaints of shortness of breath paramedics found her at home (PT) - Abnormal; Notable for the following components:    PT 19.0 (*)     INR 1.55 (*)     All other components within normal limits   POCT GLUCOSE - Abnormal; Notable for the following components:    POC Glucose 154 (*)     All other components within nor PORTABLE  (CPT=71045), 1/07/2020, 4:19 PM.  [facility] , CT, CT ABDOMEN+PELVIS(CPT=74176), 12/24/2019, 3:27 PM.  [facility] , XR, XR CHEST AP PORTABLE  (CPT=71045), 12/23/2019, 8:40 PM.  [facility] , XR, XR CHEST AP PORTABLE  (CPT=71045), 12/29/2019, 1:42 AM.  [facility] infectious disease. Dr. Mosquera January. Who recommended patient get meropenem. Patient was given IV fluids    I did discuss this case with the [facility] hospitalist who did want a CT of the brain before the patient went up to the ICU.     A total of3 5 minutes of cr

## 2020-04-28 NOTE — PLAN OF CARE
Pt arrived from ED at 1430 intubated and sedated. VSS. SR. Follows commands. Moderate thick, pink tinged sputum from ETT and mouth. Fernandes inserted with adequate UOP. Tmax 99.7. 100% FIO2/10 PEEP.  Lawanna Duverney (daughter and POA) updated on POC and is requesting DN

## 2020-04-28 NOTE — ED NOTES
Intubation team at bedside. Extremely labored breathing All accessory muscles being used. Pt diaphoretic.

## 2020-04-28 NOTE — ANESTHESIA PROCEDURE NOTES
Airway  Date/Time: 4/28/2020 10:52 AM  Urgency: emergent      General Information and Staff    Patient location during procedure: ED  Anesthesiologist: Simone Smith MD  Performed: anesthesiologist     Consent for Airway (if performed for an anesthetic, see

## 2020-04-28 NOTE — ED NOTES
Per ER Charge RN, will wait for bed to be assigned, transport pt to CT and then to floor.    Calling nursing supv now for bed

## 2020-04-28 NOTE — CONSULTS
Pulmonary / Critical Care H&P/Consult       NAME: Pierre WOODSON [address]: A0/A0 - MRN: [id number] - Age: 75year old - :  1955    Date of Admission: 2020 10:35 AM  Admission Diagnosis: No admission diagnoses are documented for this encounter removed   • CHOLECYSTECTOMY     • EXPLORATORY LAPAROTOMY N/A 12/28/2019    Performed by Krystina Field MD at [address] [place] [address]   • MOUTH ODONTECTOMY N/A 6/12/2015    Performed by José Manuel Gold DDS at [facility] MAIN OR   • OTHER Bilateral 1969    Knee caps removed   • Sexual activity: Not on file    Lifestyle      Physical activity:        Days per week: Not on file        Minutes per session: Not on file      Stress: Not on file    Relationships      Social connections:        Talks on phone: Not on file        Gets to symmetrical, trachea midline, no adenopathy;        thyroid:  No enlargement/tenderness/nodules; no carotid    bruit or JVD   Lungs:     Coarse bs bilat   Chest wall:    No tenderness or deformity   Heart:    Regular rate and rhythm, S1 and S2 normal, no m intubation abg with metabolic acidosis; vent adjusted to try to help with resp compensation. Repeat abg later this evening  - empiric abx for aspiration with meropenem  2.  Pneumonia: suspected covid-19 infection vs aspiration  - fevers: no  - lymphopenia:

## 2020-04-28 NOTE — H&P
[facility] hospitalist H+P  PCP Agustin Finnegan MD  CC unresponsive  HPI 76 yo female with multiple medical problems including but not limited to COPD, DVT, protein S deficiency,  HTN, HL, Bipolar disorder was at home, had fever and sent to ER.  In ER hypoxic, incre Yes   Family history unknown: Yes     Social History    Socioeconomic History      Marital status:       Spouse name: Not on file      Number of children: Not on file      Years of education: Not on file      Highest education level: Not on file Demerol [Meperidine]        Comment:Nausea and vomiting  Gabapentin              OTHER (SEE COMMENTS)    Comment:Loss of vision  Lovenox [Enoxaparin]    RASH  Macrobid [Nitrofura*        Comment:SOB  Nitrofurantoin              Comment:Not sure  O Inhalation Aero Soln, Inhale 2 puffs into the lungs daily.  (Patient not taking: Reported on 4/15/2020 ), Disp: 1 Inhaler, Rfl: 6  ipratropium-albuterol 0.5-2.5 (3) MG/3ML Inhalation Solution, USE 1 VIAL VIA NEBULIZER EVERY 6 HOURS AS NEEDED (Patient not ta currently intubated    HTN BP is controlled       hx of  perforated bowel / S/P hemicolectomy with mucous fistula and ileostomy / tubular adenoma  Patient to OR 12/28/19 for emergent EX LAP, right hemicolectomy, mucous fistula and colostomy.     follow up

## 2020-04-28 NOTE — VASCULAR ACCESS
Vascular note;  Aida Campbell RN successfully inserted triple lumen power PICC in RUE basilic vein. 47 cm length. 0 external  Length. 38 cm RUE circumference.  + blood return, easily flushes with normal saline. Tolerated well.  Report given to Leo Garcia

## 2020-04-28 NOTE — RESPIRATORY THERAPY NOTE
Patient came in unresponsive, COVID +. Intubated by team in ER. ABG siobhanwrosanna and adjustments made to V60. Settings   rr 22  Pmin/max 30-40  100%  Transferred to ICU after CT.   No events

## 2020-04-28 NOTE — ED NOTES
Talk to the sister-in-law for this patient who stated that she is been having a little bit of a cough for the last 2 days. Her niece tested positive for COVID-19. And had been with her previously.   Today the family found that she was more confused and di

## 2020-04-28 NOTE — RESPIRATORY THERAPY NOTE
Received Pt from ER intubated on V60. Ventilator changed to 840- settings VC+ 22/550/100%/+10. ABG collected and reported to Dr. Karrie Aguero. Rate increased to 26. F/U ABG ordered. Sputum collected for lab orders- Culture and Covid-19 testing.  Walked sample d

## 2020-04-28 NOTE — ED NOTES
Propofol handoff at bedside with [facility]. Bedside report also given at same time.

## 2020-04-28 NOTE — CONSULTS
INFECTIOUS DISEASE CONSULTATION    Donald Morris Patient Status:  Inpatient    1955 MRN [id number]   [facility] 4SW-A Attending Kashmir Ambriz MD   Hosp Day # 0 PCP Hebert Thomas BENIGN BIOPSY LEFT  1998    cyst removed   • CHOLECYSTECTOMY     • EXPLORATORY LAPAROTOMY N/A 12/28/2019    Performed by Merrill Greer MD at [address]   • MOUTH ODONTECTOMY N/A 6/12/2015    Performed by Marjan Wright DDS at [facility] MAIN OR   • OTHER Bilate Doxycycline Hyclate (VIBRAMYCIN) 100 mg in sodium chloride 0.9% 100 mL IVPB, 100 mg, Intravenous, Q12H  •  heparin(PORCINE) 80889mlkhd/250mL infusion INITIAL DOSE, 18 Units/kg/hr, Intravenous, Once  •  heparin (PORCINE) drip 91210bxqih/250mL infusion JESUS (5 mg total) by mouth daily. , Disp: 90 tablet, Rfl: 0  Albuterol Sulfate  (90 Base) MCG/ACT Inhalation Aero Soln, Inhale 1-2 puffs into the lungs every 6 (six) hours as needed for Wheezing or Shortness of Breath., Disp: 8.5 g, Rfl: 0  IPRATROPIUM BR ALKPHO 140   AST 30   ALT 26   BILT 0.2   TP 7.1         Lab Results   Component Value Date    INR 1.55 04/28/2020    PTP 19.0 04/28/2020    DDIMER <0.27 04/28/2020    CRP 3.85 04/28/2020    TROP 0.075 04/28/2020     04/28/2020    PGLU 154 04/28/20 Cerebral atrophy (HCC)     Deep vein thrombosis (DVT) of left lower extremity (HCC)     Tubular adenoma of colon     Respiratory failure with hypoxia (HCC)     Respiratory failure with hypoxia, unspecified chronicity (HCC)      ASSESSMENT/PLAN:  1.  Suspect

## 2020-04-29 NOTE — CERTIFICATION
**Certification    PHYSICIAN Certification of Need for Inpatient Hospitalization    Based on the her current state of illness, Flo Doshi requires inpatient hospitalization for her acute respiratory failure      This requires inpatient medical treatm

## 2020-04-29 NOTE — PROGRESS NOTES
[facility] Hospitalist note    PCP: Claude Brannon MD    Chief Complaint:  Acute hypoxic respiratory failure    SUBJECTIVE:  Intubated, sedated  I did not go into room given pt is covid PUI and to preserve PPE    OBJECTIVE:  Temp:  [96.6 °F (35.9 °C)-99.7 °F (3 • Albuterol Sulfate HFA  8 puff Inhalation QID   • hydroxychloroquine sulfate  200 mg Oral BID   • meropenem  500 mg Intravenous Q12H   • doxycycline  100 mg Intravenous Q12H   • docusate sodium  100 mg Oral BID   • Chlorhexidine Gluconate  15 mL Mouth

## 2020-04-29 NOTE — PLAN OF CARE
Assumed care of pt for the night shift. Pt intubated sedated on propofol. Received pt on fio2 100 and Peep of 10. OG intact to LIS. PICC intact. Heparin drip infusing per protocol. Discussed proning pt with Michael Leslie APN due to P/F ratio.  Per Michael Fus APN n

## 2020-04-29 NOTE — CONSULTS
[address] [facility] Cardiology Consult      Zachary Marsh Patient Status:  Inpatient    1955 MRN [id number]   [facility] 4SW-A Attending Joel Bobo MD   Hosp Day # 1 PCP MD Zachary Hartley is a 75 year o sure  Cipro [Ciprofloxaci*      Demerol                   Demerol [Meperidine]        Comment:Nausea and vomiting  Gabapentin              OTHER (SEE COMMENTS)    Comment:Loss of vision  Lovenox [Enoxaparin]    RASH  Macrobid [Nitrofura*        Comment:SOB 1  Cholecalciferol (VITAMIN D3) 1.25 MG (22548 UT) Oral Cap, Take by mouth., Disp: , Rfl:   aMILoride HCl 5 MG Oral Tab, Take 1 tablet (5 mg total) by mouth daily. , Disp: 90 tablet, Rfl: 0  Albuterol Sulfate  (90 Base) MCG/ACT Inhalation Aero Soln, Encounters:  04/28/20 : 240 lb 4.8 oz (109 kg)  04/15/20 : 241 lb (109.3 kg)  04/13/20 : 235 lb (106.6 kg)  04/06/20 : 235 lb (106.6 kg)  The patient was not seen face-to-face due to being COVID-19 positive.  Information was gathered from discussion with gregory 5. copd    Plan:  · For elevated troponin, ECG without ischemic changes and echo no wall motion abnormalities.  Suspect transient demand>supply with acute severe illness  · ICU care      Hira Sevilla MD  Cardiology / Charli Cardenas

## 2020-04-29 NOTE — PROGRESS NOTES
Updated patient's daughter- Alisa Byrd with all questions and concerns addressed. Kaitlin requesting patient to be a DNR with no chest compressions/shocks. Ok to continue present management with ETT in place. Ok for vasopressors if needed.     Code status manriquez

## 2020-04-29 NOTE — PROGRESS NOTES
[address]  Patient Status:  Inpatient    1955 MRN [id number]   [facility] 4SW-A Attending Keshav Garcia MD   Hosp Day # 1 PCP Jennifer Lomeli MD     Pulm / Critical Care Progress Note     S: remains intubated. Recent Labs   Lab 04/28/20  1042 04/29/20  0511   WBC 11.6* 13.8*   HGB 12.6 10.9*   HCT 41.4 35.4   .0 196.0     Recent Labs   Lab 04/23/20 04/28/20  1042 04/29/20  0711   INR 1.4* 1.55* 1.61*         Recent Labs   Lab 04/28/20  1042      K confirmed will dose with actemra  3. Metabolic acidosis: nongap; incomplete resp compensation. Lactic acid wnl. ?from  [address]; on review of labs bicarb chronically low. - adjusted vent to help with resp compensation. Repeat abg pendnig.    4. H/o recurrent v

## 2020-04-29 NOTE — PROGRESS NOTES
[facility]                INFECTIOUS DISEASE PROGRESS NOTE    Haily Davison Patient Status:  Inpatient    1955 MRN [id number]   [facility] 4SW-A Attending Delmar Winkler MD   Hosp Day # 1 PCP Sammy Cabrales MD     Antibiot Status: None (Preliminary result)    Collection Time: 04/28/20 10:52 AM   Result Value Ref Range    Blood Culture Result No Growth 1 Day N/A         Radiology    CXR 4/28    Problem list reviewed:  Patient Active Problem List:     Pure hypercholesterolemi chronicity ([facility] [address]        ASSESSMENT/PLAN:  1.  Hypoxia/Respiratory failure on vent  Pulmonary managing  Suspected COVID infection  -exposure to niece on 4/18, and 4/21, that tested positive  Fever, sob, and respiratory failure, PCT <.05    Rapid test negativ

## 2020-04-29 NOTE — DIETARY NOTE
[facility]    NUTRITION INITIAL ASSESSMENT    Pt does not meet malnutrition criteria. NUTRITION DIAGNOSIS/PROBLEM:  Inadequate oral intake related to physiological causes as evidenced by need for enteral nutrition support, intubated, sedated.     NU ~1 ml/kcal or per MD discretion    MONITOR AND EVALUATE/NUTRITION GOALS:    3. No signs of skin breakdown  4. Maintain lean body mass  5. Tolerance of enteral nutrition without signs/symptoms of intolerance (abdominal discomfort/distention)  6.  Alternative

## 2020-04-29 NOTE — PLAN OF CARE
Received report and assumed care of pt at 0730. Pt remains on ventilator, propofol gtt for sedation, however pt tachypnic, grimacing at times, daughter states \"has chronic pain\". Fentanyl gtt added, pt appears more comfortable.  Heparin gtt per DVT protoc

## 2020-04-29 NOTE — RESPIRATORY THERAPY NOTE
Pt remain stable on vent setting of RR 26  FiO2 70% Peep 10. Lower respiratory sample send to lab for covid-19 test. Breath sounds are clear with minimal secretions.

## 2020-04-30 NOTE — PROGRESS NOTES
[address]  Patient Status:  Inpatient    1955 MRN [id number]   [facility] 4SW-A Attending Heather Welch MD   Hosp Day # 2 PCP Betty Nieto MD     Pulm / Critical Care Progress Note     S: remains intubated obvious abnormality, atraumatic. Lungs: coarse bs bilat   Chest wall: No tenderness or deformity. Heart: Regular rate and rhythm, normal S1S2, no murmur. Abdomen: soft, non-tender, non-distended, positive BS.    Extremity: + lymphedema       Recent La ventilation; repeat abg this am and adjust vent as able  - empiric abx for aspiration with meropenem  2.  Pneumonia: suspected covid-19 infection vs aspiration  - fevers: no  - lymphopenia: yes  - travel: no  - known exposure: yes  - inflammatory markers: c

## 2020-04-30 NOTE — PROGRESS NOTES
[facility] Cardiology Progress Note      Alexandra Chavez Patient Status:  Inpatient    1955 MRN [id number]   [facility] 4SW-A Attending Keshav Garcia MD   Hosp Day # 2 PCP Jennifer Lomeli MD     Subjective:    No changes.  On The cavity size was normal. Wall thickness was mildly     increased. Systolic function was normal. The estimated ejection fraction     was 56%. Doppler parameters are consistent with abnormal left ventricular     relaxation - grade 1 diastolic dysfunction.

## 2020-04-30 NOTE — PROGRESS NOTES
[facility]                INFECTIOUS DISEASE PROGRESS NOTE    Rj Metcalf Patient Status:  Inpatient    1955 MRN [id number]   [facility] 4SW-A Attending Sanford Keyes MD   Hosp Day # 2 PCP MD Cuba Perez 04/28/20   1. URINE CULTURE, ROUTINE     Status: None    Collection Time: 04/28/20  5:10 PM   Result Value Ref Range    Urine Culture No Growth at 18-24 hrs. N/A   2.  BLOOD CULTURE     Status: None (Preliminary result)    Collection Time: 04/28/20 10:52 AM Deep vein thrombosis (DVT) of left lower extremity (HCC)     Tubular adenoma of colon     Respiratory failure with hypoxia (HCC)     Respiratory failure with hypoxia, unspecified chronicity (HCC)        ASSESSMENT/PLAN:  1.  Hypoxia/Respiratory failure on

## 2020-04-30 NOTE — PLAN OF CARE
4/29/20 2000  Received patient on full vent support. Withdraws to pain but does not follow any command. Enteral nutrition ongoing. No residuals noted. She is SR w/ Ist deg AVB on tele. Minimal ETT secretions.  Oral care done and patient repositioned every 2 RESPIRATORY - ADULT  Goal: Achieves optimal ventilation and oxygenation  Description  INTERVENTIONS:  - Assess for changes in respiratory status  - Assess for changes in mentation and behavior  - Position to facilitate oxygenation and minimize respiratory administer replacement therapy as ordered  Outcome: Progressing     Problem: GASTROINTESTINAL - ADULT  Goal: Maintains or returns to baseline bowel function  Description  INTERVENTIONS:  - Assess bowel function  - Maintain adequate hydration with IV or PO Hemodynamic stability and optimal renal function maintained  Description  INTERVENTIONS:  - Monitor labs and assess for signs and symptoms of volume excess or deficit  - Monitor intake, output and patient weight  - Monitor urine specific gravity, serum osm

## 2020-04-30 NOTE — PLAN OF CARE
Received report and assumed care of pt at 0730. Pt remains on ventilator, agitated this morning during awakening trial- see EMAR. Pt able to follow commands weakly, placed back on propofol gtt for sedation.  Pt does not demonstrate signs of pain, fentanyl g

## 2020-05-01 PROBLEM — Z71.89 GOALS OF CARE, COUNSELING/DISCUSSION: Status: ACTIVE | Noted: 2020-01-01

## 2020-05-01 PROBLEM — Z51.5 PALLIATIVE CARE ENCOUNTER: Status: ACTIVE | Noted: 2020-01-01

## 2020-05-01 NOTE — PLAN OF CARE
4/29/20 2000  Patient remained  on full vent support. Withdraws to pain. Has been afebrile. She is SR w/ Ist degree AVB on tele. Tubefeeding ongoing. Restraints in place for safety. Heparin drip infusing. Fernandes in place.  Fentanyl and propofol drips continue behavior  - Position to facilitate oxygenation and minimize respiratory effort  - Oxygen supplementation based on oxygen saturation or ABGs  - Provide Smoking Cessation handout, if applicable  - Encourage broncho-pulmonary hygiene including cough, deep audi function  Description  INTERVENTIONS:  - Assess bowel function  - Maintain adequate hydration with IV or PO as ordered and tolerated  - Evaluate effectiveness of GI medications  - Encourage mobilization and activity  - Obtain nutritional consult as needed excess or deficit  - Monitor intake, output and patient weight  - Monitor urine specific gravity, serum osmolarity and serum sodium as indicated or ordered  - Monitor response to interventions for patient's volume status, including labs, urine output, bloo

## 2020-05-01 NOTE — PROGRESS NOTES
[facility] Hospitalist Progress Note     PCP: Richelle Benavidez MD    SUBJECTIVE:  Remains intubated and ventilated    OBJECTIVE:  Temp:  [96.9 °F (36.1 °C)-98 °F (36.7 °C)] 97.3 °F (36.3 °C)  Pulse:  [59-87] 73  Resp:  [26-30] 26  BP: ()/(55-92) 117/65  FiO2 200 mg Oral BID   • meropenem  500 mg Intravenous Q12H   • doxycycline  100 mg Intravenous Q12H   • docusate sodium  100 mg Oral BID   • Chlorhexidine Gluconate  15 mL Mouth/Throat Ca@[employer]   • famoTIDine  20 mg Oral Daily    Or   • famoTIDine  20 mg fistula/colostomy     5) NAGMA- vent adjustments per pulm     6) LESTER - improving; cont to trend bmp    Per ICU documentation- pt is DNR, okay to cont ETT  I've discussed with Rahul Sam from palliative. She will discuss goals of care with the family.   Hus

## 2020-05-01 NOTE — CONSULTS
[address] Initial Consult      Chandler Severino  [id number]  Hospital Day #3  Date of Consult: 05/01/20  Patient seen at: [facility]     This patient isPUI for COVID-19.  For purposes of responsible PPE use in this time PROTEIN S DEFICIENCY   • Hx of diseases NEC     CHRONIC CONSTIPATION   • Incontinence    • Muscle weakness    • Neuropathy     bilateral feet   • Obesity, unspecified    • Osteoarthrosis, unspecified whether generalized or localized, unspecified site    • *    RASH    Comment:Rash, swelling  Cephalosporins              Comment:Not sure  Cipro [Ciprofloxaci*      Demerol                   Demerol [Meperidine]        Comment:Nausea and vomiting  Gabapentin              OTHER (SEE COMMENTS)    Comment:Loss of (TYLENOL) 160 MG/5ML oral liquid 650 mg, 650 mg, Oral, Q6H PRN    Or  acetaminophen (TYLENOL) 650 MG rectal suppository 650 mg, 650 mg, Rectal, Q6H PRN  docusate sodium (COLACE) liquid 100 mg, 100 mg, Oral, BID  PEG 3350 (MIRALAX) powder packet 17 g, 17 g, Scale : 30%    Palliative Performance Scale   % Ambulation Activity Level Self-Care Intake Consciousness   100 Full Normal Full   Normal Full   90 Full No disease  Normal Full Normal Full   80 Full Some disease  Normal w/effort Full Normal or  Reduced Full details, as she is very concerned about his mental state but just confirm that she can make medical decisions. I spoke to the Howard's nurse at [facility] her of the situation and she put Howard on speaker phone.  He confirmed that he was there when Jesus long hypertension     Primary hypercoagulable state ([facility] [D68.59]     Abnormal CXR     Coronary artery disease involving native coronary artery without angina pectoris     Charcot foot due to diabetes mellitus ([facility]     Altered mental status     Altered mental

## 2020-05-01 NOTE — RESPIRATORY THERAPY NOTE
Received pt vented, VC+ 26/550/50%/+10, tolerating well. Decreased FiO2 to 40%. Will continue to monitor closely.

## 2020-05-01 NOTE — PROGRESS NOTES
Discussed with Steve Self palliative care APN and Jada Perez, daughter wishes to transition patient to comfort care. Orders placed in the computer.       JERAMIE Fischer  Critical Care NP  Phone [phone], pager [phone]

## 2020-05-01 NOTE — PLAN OF CARE
Received report and assumed care of pt at 0730. Pt on ventilator, sedated/pain control with propofol/fentanyl gtts. VSS. Fernandes in place, ileostomy/mucous fistula clean,dry intact. OG with tube feeds, tolerating well.  Around  received phone call from [facility]

## 2020-05-01 NOTE — RESPIRATORY THERAPY NOTE
Received pt this morning with ventilator setting of RR 26  FiO2 40% Peep 10. FiO2 was increased to 50% due to desaturation to 88 % on Pulse ox. Breath sounds are clear diminish with minimal secretions.

## 2020-05-01 NOTE — PROGRESS NOTES
05/01/20 1746   Clinical Encounter Type   Visited With Family  (Daughter Avelina Porter)   Routine Visit Follow-up   Continue Visiting No    followed up with daughter Avelina Porter on the telephone. Avelina Porter stated that Miguel Santos are just waiting now\".   Avelina Porter stated

## 2020-05-01 NOTE — PROGRESS NOTES
Critical Care Progress Note     Assessment / Plan:  1. Acute hypoxic resp failure - DDx includes covid, bacterial pneumonia, aspiration, cardiac. PE is considered less likely given chronic xarelto use and normal d-dimer.  Rapid covid negative  - continue fu

## 2020-05-01 NOTE — PROGRESS NOTES
05/01/20 1549   Clinical Encounter Type   Visited With Health care provider;Family  (RN and daughter Haines by phone)   Routine Visit Introduction   Continue Visiting Yes  (Daughter stated to  [facility] call back later\". )   Referral From Nurse  (paged as

## 2020-05-02 NOTE — PLAN OF CARE
Received patient after rn report. Sedated on fentanyl drip and precedex. Prn medications given for labored breathing-see mar. An update given to daughter Jeff. Emotional support offered.     Problem: DEATH & DYING  Goal: Pt/Family communicate acceptance of

## 2020-05-02 NOTE — PROGRESS NOTES
[facility] hospitalist daily note    S; pt on comfort care    Medications in Epic    PE    05/02/20  0757   BP: 112/66   Pulse: 60   Resp: 17   Temp: 97.6 °F (36.4 °C)      Gen: no acute  distress  HEENT; not jaundice, atraumatic  Psych calm    Labs  5/1/20 gluco

## 2020-05-03 PROBLEM — J96.90 RESPIRATORY FAILURE (HCC): Status: ACTIVE | Noted: 2020-01-01

## 2020-05-03 NOTE — PROGRESS NOTES
05/03/20 1318   Clinical Encounter Type   Visited With Health care provider;Patient   Routine Visit Follow-up   Continue Visiting No   [facility] Encounters   [facility] Needs Prayer   Patient Spiritual Encounters   Spiritual Interventions  checke

## 2020-05-03 NOTE — PLAN OF CARE
Assumed care after RN repot. Fent/precedex for comfort. Fent bolus for pain. Comfort care measures. Tx orders in place. See flowsheets. Evgeny cont to monitor closely. Fent patches applied per order. Fent gtt/precedex d/c'd; transitioned to morphine gtt.

## 2020-05-03 NOTE — PROGRESS NOTES
Patient opioid tolerant. Fentanyl patch x ordered in order to stop fentanyl drip. -start morphine drip (higher dose). -cont lorazepam prn    -stop fentanyl pca when fentanyl patches on for 1 hour.           EFFIE Swenson  Critical Care Nurse Pr none

## 2020-05-03 NOTE — HOSPICE RN NOTE
Consents were obtained from daughter Gonzalo Washburn electronically via [facility]. Gonzalo Washburn indicated to [address] that she does not want to be primary contact but chooses her aunt Lynn Gambino to be primary.   This was communicated to KARL Camara

## 2020-05-03 NOTE — PLAN OF CARE
Received patient on morphine gtt 5mg/hr. Patient continued to be tense, grimacing, moaning, w/ labored breathing and accessory muscle use. Plan of care discussed with Dr. Marleta Sacks and Sonido Mckinley, Hospice RN. See MAR and flowsheets.  1530: Patient appears more comf

## 2020-05-03 NOTE — DISCHARGE SUMMARY
[facility] Internal Medicine Discharge Summary   Patient ID:  Alexandra Chavez  [id number]  89 year old  6/29/1955    Admit date: 4/28/2020    Discharge date and time: 5/3/2020     Attending Physician: No att. providers found     Primary Care Physician: Patrica Jiang jaundice      Discharge meds     Medication List      You have not been prescribed any medications.          Consults: IP CONSULT TO PULMONOLOGY  IP CONSULT TO INFECTIOUS DISEASE  IP CONSULT TO VASCULAR ACCESS TEAM  IP CONSULT TO FOOD AND NUTRITION SERVICES osseous abnormality. OTHER:             None. CONCLUSION:  No acute intracranial abnormality. Findings consistent with chronic and acute sinusitis as outlined above. Mild chronic inflammatory changes as well in the left mastoid air cells.    Dictated by: intermedius and right lower lobe bronchus. The upper zones are clear. Endotracheal tube projects over the mid trachea. CONCLUSION:  Right greater than left bibasilar atelectasis versus developing infiltrates. Right hilar mass possibly adenopathy.     Raúl Coppola

## 2020-05-03 NOTE — H&P
[facility] hospitalist H+P  PCP Richelle Benavidez MD  CC admit for hospice  HPI patient was admitted on 4/28/20 with respiratory failure. Was intubated. There was concern for COVID.  Family decided to wean off the went and pt now admitted to hospice  Past Medical Hist file    Occupational History      Not on file    Social Needs      Financial resource strain: Not on file      Food insecurity:        Worry: Not on file        Inability: Not on file      Transportation needs:        Medical: Not on file        Non-medica Comment:Not sure  Other                   OTHER (SEE COMMENTS)    Comment:Steroid medication             Causes michele  Med  No current facility-administered medications on file prior to encounter.    No current outpatient medications on file prior to encoun

## 2020-05-04 NOTE — PLAN OF CARE
Tele paged to notify of pt being in v-fib, v-tach, and a-fib, Morphine gtt increased from 12 to 15 gtt/hr, will continue to provide comfort care

## 2020-05-04 NOTE — PROGRESS NOTES
Post-mortem care completed. Body prepared for [facility] per protocol. Spoke with Judy Bowles regarding pt's belongings, verbalized understanding. Pt transported to [facility] via stretcher. Belongings labeled and sent with pt.

## 2020-05-04 NOTE — PROGRESS NOTES
05/04/20 0824   Clinical Encounter Type   Visited With Family   Crisis Visit Death   Patient Spiritual Encounters   Spiritual Coping Strategies  told Andrewinder Mejiaalfonso that spiritual care is available to support brother Renetta Felix at [facility], as requested.  She will

## 2020-05-04 NOTE — PLAN OF CARE
Arrived  From ICU  Hospice care/comfort care  Room air- O2 stat at 62%  Sleeping currently  Non-responsive   Will continue to monitor

## 2020-05-04 NOTE — PLAN OF CARE
Inpatient hospice care, RR 9 with accessory muscle use, labored breathing, O2 saturation at 80% RA, morphine 12 gtt/hr, fentanyl patch to abdomen, BLE warm to touch, +1 pedal pulses, redness to BLE, +2 pitting edema to Right hand/arm, Right leg and Leg leg

## 2020-05-04 NOTE — PLAN OF CARE
0712:pt unresponsive, no signs of breathing, no rise and fall of chest, no heart sounds heard via stethscope, no palpable carotid pulse, pulse ox unreadable, face pale, 2nd RN in room to verify death of pt at 32 82 12- 05/04/20,  Attending and following consul

## 2020-05-14 NOTE — DISCHARGE SUMMARY
[facility] Internal Medicine Discharge Summary   Patient ID:  Sai Rodrigues  [id number]  30 year old  6/29/1955    Admit date: 5/3/2020    Discharge date and time: 5/42020     Attending Physician: No att. providers found     Primary Care Physician: Kinjal Antoine

## 2020-11-09 NOTE — CM/SW NOTE
Received referal, ecin placed .  Calling daughter
SW spoke to [facility]. Hospice order placed. SW contacted Reema ceballos [facility] will meet with pt to assess.     Janessa Morales MSW, LCSW   at [facility]  Ph: [phone] or Dana Nguyen Se
Health Care Proxy (HCP)

## 2021-10-30 NOTE — PROGRESS NOTES
[facility] Hospitalist Progress Note     [facility]    CC: 65y/o F with bipolar on lithium, chronic pain on fentanyl patch/oxy, brought in with AMS, found to have lithium toxicity.  Then course c/b bowel perf with ostomy    SUBJECTIVE:  Overall doing better 1. 7 1.7 1.7   PHOS 2.9  --  3.0 3.4 3.3 3.2  --   --    *   < > 118* 116* 117* 105* 113* 121*    < > = values in this interval not displayed. Recent Labs   Lab 01/03/20  0514 01/04/20  7103 01/05/20  0533 01/06/20  0539 01/07/20  0558   ALB 2. Bromide (ATROVENT) 0.06 % nasal spray 1 spray, 1 spray, Each Nare, QID PRN  ipratropium-albuterol (DUONEB) nebulizer solution 3 mL, 3 mL, Nebulization, Q6H PRN  sodium Chloride 0.9 % nebulizer solution 3 mL, 3 mL, Nebulization, PRN  Umeclidinium Bromide (I [phone]

## 2023-08-11 NOTE — ED PROVIDER NOTES
Patient Seen in: [facility] Emergency Department    History   Patient presents with:  Lower Extremity Injury (musculoskeletal)  Fall (musculoskeletal, neurologic)    Stated Complaint: fall yesterday at home/ankle pain    HPI    The patient is a 74-y Pt returning to nursing home, transport here at this time.      Ya Calderon RN  08/11/23 0179 bilateral feet   • Obesity, unspecified    • Osteoarthrosis, unspecified whether generalized or localized, unspecified site    • Other and unspecified hyperlipidemia    • Unspecified essential hypertension    • Visual impairment     glasses       Past S is no focal tenderness to palpation appreciated. There is no JVD. No meningeal signs or nuchal rigidity appreciated. No stridor. LUNGS: Clear to auscultation bilaterally with no wheeze. There is good equal air entry bilaterally.   HEART: Regular rate and r following components:    RDW-SD 46.7 (*)     All other components within normal limits   TROPONIN I - Normal   CBC WITH DIFFERENTIAL WITH PLATELET    Narrative: The following orders were created for panel order CBC WITH DIFFERENTIAL WITH PLATELET.   Pro (xmd=[id number])    Result Date: 1/27/2019  PROCEDURE:  XR KNEE (1 OR 2 VIEWS), RIGHT (CPT=73560)  COMPARISON:  MARJAN , KNEE W/O CONTRAST, RIGHT MRI, 11/07/2009, 9:06.   INDICATIONS:  fall yesterday at home/ankle pain  PATIENT STATED HISTORY: (As transcribed by Calcification in the 3rd ventricle is chronic and unchanged. Visualized portions of paranasal sinuses are unremarkable. Chronic opacification of the left mastoid air cells since 2014 is noted. Visualized portions of orbits are unremarkable.   IMPRESSION: death.  The patient will be discharged home [address] at this time. Patient is aware of the risks of leaving at this time including worsening of symptoms or even death.   The patient will follow up with her primary care doctor and knows to monae

## 2023-08-12 NOTE — PROGRESS NOTES
[address] [facility]  TEL: [phone]  FAX: [phone]    Baron Santos Patient Status:  Inpatient    1955 MRN [id number]   [facility] 4SW-A Attending Ping Carmona MD   [facility] Day [address] [facility] 118* 116* 117* 118*   CO2 20.0* 23.0 20.0* 19.0*       Microbiology    Reviewed in EMR,   Hospital Encounter on 12/14/19   1.  BLOOD CULTURE     Status: None    Collection Time: 12/28/19  6:06 PM   Result Value Ref Range    Blood Culture Result No Growth 5 4 = No assist / stand by assistance

## (undated) DEVICE — SUTURE SILK 2-0

## (undated) DEVICE — 1-PIECE DRAINABLE OSTOMY POUCH, FLEXWEAR: Brand: PREMIER

## (undated) DEVICE — LAPAROTOMY SPONGE - RF AND X-RAY DETECTABLE PRE-WASHED: Brand: SITUATE

## (undated) DEVICE — SOL  .9 1000ML BTL

## (undated) DEVICE — OSTOMY POUCH CLAMP: Brand: HOLLISTER

## (undated) DEVICE — LAPAROTOMY CDS: Brand: MEDLINE INDUSTRIES, INC.

## (undated) DEVICE — VIOLET BRAIDED (POLYGLACTIN 910), SYNTHETIC ABSORBABLE SUTURE: Brand: COATED VICRYL

## (undated) DEVICE — 3M(TM) MICROPORE TAPE DISPENSER 1535-2: Brand: 3M™ MICROPORE™

## (undated) DEVICE — HEX-LOCKING BLADE ELECTRODE: Brand: EDGE

## (undated) DEVICE — PROXIMATE LINEAR CUTTER RELOAD, BLUE, 75MM: Brand: PROXIMATE

## (undated) DEVICE — BLADE ELECTRODE: Brand: EDGE

## (undated) DEVICE — GOWN,SIRUS,FABRIC-REINFORCED,X-LARGE: Brand: MEDLINE

## (undated) DEVICE — SUTURE SILK 0

## (undated) DEVICE — GAMMEX® NON-LATEX PI ORTHO SIZE 8.5, STERILE POLYISOPRENE POWDER-FREE SURGICAL GLOVE: Brand: GAMMEX

## (undated) DEVICE — REM POLYHESIVE ADULT PATIENT RETURN ELECTRODE: Brand: VALLEYLAB

## (undated) DEVICE — PROXIMATE RELOADABLE LINEAR CUTTER WITH SAFETY LOCK-OUT, 75MM: Brand: PROXIMATE

## (undated) DEVICE — LIGASURE IMPACT OPEN DEVICE

## (undated) DEVICE — KENDALL SCD EXPRESS SLEEVES, KNEE LENGTH, MEDIUM: Brand: KENDALL SCD

## (undated) DEVICE — POOLE SUCTION HANDLE: Brand: CARDINAL HEALTH

## (undated) DEVICE — SUTURE PDS II 1 ST-21

## (undated) DEVICE — ADHESIVE MASTISOL 2/3CC VL

## (undated) DEVICE — TOWEL OR BLU 16X26 STRL

## (undated) DEVICE — SUTURE SILK 3-0 SH

## (undated) DEVICE — CHLORAPREP 26ML APPLICATOR

## (undated) DEVICE — ABDOMINAL PAD: Brand: DERMACEA

## (undated) DEVICE — PROXIMATE SKIN STAPLERS (35 WIDE) CONTAINS 35 STAINLESS STEEL STAPLES (FIXED HEAD): Brand: PROXIMATE

## (undated) DEVICE — SUTURE SILK 3-0

## (undated) NOTE — ED AVS SNAPSHOT
Kaila Ambriz   MRN: [id number]    Department:  [facility] Emergency Department   Date of Visit:  11/6/2019           Disclosure     Insurance plans vary and the physician(s) referred by the ER may not be covered by your plan.  Please contact tell this physician (or your personal doctor if your instructions are to return to your personal doctor) about any new or lasting problems. The primary care or specialist physician will see patients referred from the [facility] Emergency Department.  Cheryle Levins

## (undated) NOTE — ED AVS SNAPSHOT
Gardenia Mcgowan   MRN: [id number]    Department:  [facility] Emergency Department   Date of Visit:  9/26/2018           Disclosure     Insurance plans vary and the physician(s) referred by the ER may not be covered by your plan.  Please contact tell this physician (or your personal doctor if your instructions are to return to your personal doctor) about any new or lasting problems. The primary care or specialist physician will see patients referred from the [facility] Emergency Department.  Cheryle Levins

## (undated) NOTE — ED AVS SNAPSHOT
Donald Morris   MRN: [id number]    Department:  [facility] Emergency Department   Date of Visit:  10/30/2019           Disclosure     Insurance plans vary and the physician(s) referred by the ER may not be covered by your plan.  Please contact tell this physician (or your personal doctor if your instructions are to return to your personal doctor) about any new or lasting problems. The primary care or specialist physician will see patients referred from the [facility] Emergency Department.  Daniel Ellis

## (undated) NOTE — LETTER
Amber Mcdonnell [address] [address]  [place], [address] [facility]    Consent for Operation  Date: __________________                                Time: _______________    1.  I authorize the performance upon Donny Cain the following operation:  Proced procedure has been videotaped, the surgeon will obtain the original videotape. The hospital will not be responsible for storage or maintenance of this tape.   7. For the purpose of advancing medical education, I consent to the admittance of observers to the STATEMENTS REQUIRING INSERTION OR COMPLETION WERE FILLED IN.     Signature of Patient:   ___________________________    When the patient is a minor or mentally incompetent to give consent:  Signature of person authorized to consent for patient: ____________ supplements, and pills I can buy without a prescription (including street drugs/illegal medications). Failure to inform my anesthesiologist about these medicines may increase my risk of anesthetic complications. iv.  If I am allergic to anything or have ha Anesthesiologist Signature     Date   Time  I have discussed the procedure and information above with the patient (or patient’s representative) and answered their questions. The patient or their representative has agreed to have anesthesia services.     ___

## (undated) NOTE — ED AVS SNAPSHOT
Haily Davison   MRN: [id number]    Department:  [facility] Emergency Department   Date of Visit:  1/27/2019           Disclosure     Insurance plans vary and the physician(s) referred by the ER may not be covered by your plan.  Please contact tell this physician (or your personal doctor if your instructions are to return to your personal doctor) about any new or lasting problems. The primary care or specialist physician will see patients referred from the [facility] Emergency Department.  Severo Pastures

## (undated) NOTE — LETTER
[address] [facility]     I agree to have a Peripherally Inserted Central Catheter (PICC) placed in my arm.    1. The PICC insertion procedure, care, maintenance, risks, benefits, and complications have been explained to me by my physic also discussed reasonable alternatives to the PICC, including risks, benefits, and side effects related to the alternatives and risks related to not receiving this procedure.     8.  I have expressed any questions about this procedure to my physician or the

## (undated) NOTE — LETTER
Date & Time: 10/30/2019, 9:07 PM  Patient: Tiffany Chris  Encounter Provider(s):    MD Barak Martínez [place]         This certifies that Clarisse Munoz, a patient at an [facility], am leaving the faci